# Patient Record
Sex: MALE | Race: WHITE
[De-identification: names, ages, dates, MRNs, and addresses within clinical notes are randomized per-mention and may not be internally consistent; named-entity substitution may affect disease eponyms.]

---

## 2017-01-06 ENCOUNTER — HOSPITAL ENCOUNTER (OUTPATIENT)
Age: 82
Setting detail: OBSERVATION
LOS: 3 days | Discharge: HOME | End: 2017-01-09
Payer: MEDICARE

## 2017-01-06 ENCOUNTER — HOSPITAL ENCOUNTER (OUTPATIENT)
Age: 82
Discharge: ANOTHER HOSPITAL | End: 2017-01-06

## 2017-01-06 DIAGNOSIS — E11.9: ICD-10-CM

## 2017-01-06 DIAGNOSIS — R26.0: Primary | ICD-10-CM

## 2017-01-06 DIAGNOSIS — Z66: ICD-10-CM

## 2017-01-06 DIAGNOSIS — Z86.79: ICD-10-CM

## 2017-01-06 DIAGNOSIS — Z79.82: ICD-10-CM

## 2017-01-06 DIAGNOSIS — Z60.2: ICD-10-CM

## 2017-01-06 DIAGNOSIS — J44.9: ICD-10-CM

## 2017-01-06 DIAGNOSIS — I15.2: ICD-10-CM

## 2017-01-06 DIAGNOSIS — Z87.891: ICD-10-CM

## 2017-01-06 DIAGNOSIS — F03.90: ICD-10-CM

## 2017-01-06 DIAGNOSIS — Z79.84: ICD-10-CM

## 2017-01-06 DIAGNOSIS — M25.552: ICD-10-CM

## 2017-01-06 DIAGNOSIS — E86.0: ICD-10-CM

## 2017-01-06 PROCEDURE — 83036 HEMOGLOBIN GLYCOSYLATED A1C: CPT

## 2017-01-06 PROCEDURE — 83880 ASSAY OF NATRIURETIC PEPTIDE: CPT

## 2017-01-06 PROCEDURE — 36415 COLL VENOUS BLD VENIPUNCTURE: CPT

## 2017-01-06 PROCEDURE — 96361 HYDRATE IV INFUSION ADD-ON: CPT

## 2017-01-06 PROCEDURE — 99285 EMERGENCY DEPT VISIT HI MDM: CPT

## 2017-01-06 PROCEDURE — 93005 ELECTROCARDIOGRAM TRACING: CPT

## 2017-01-06 PROCEDURE — 80053 COMPREHEN METABOLIC PANEL: CPT

## 2017-01-06 PROCEDURE — 85025 COMPLETE CBC W/AUTO DIFF WBC: CPT

## 2017-01-06 PROCEDURE — 82803 BLOOD GASES ANY COMBINATION: CPT

## 2017-01-06 PROCEDURE — 80048 BASIC METABOLIC PNL TOTAL CA: CPT

## 2017-01-06 PROCEDURE — 87275 INFLUENZA B AG IF: CPT

## 2017-01-06 PROCEDURE — 36600 WITHDRAWAL OF ARTERIAL BLOOD: CPT

## 2017-01-06 PROCEDURE — 97162 PT EVAL MOD COMPLEX 30 MIN: CPT

## 2017-01-06 PROCEDURE — 82553 CREATINE MB FRACTION: CPT

## 2017-01-06 PROCEDURE — 93010 ELECTROCARDIOGRAM REPORT: CPT

## 2017-01-06 PROCEDURE — 71010: CPT

## 2017-01-06 PROCEDURE — 70450 CT HEAD/BRAIN W/O DYE: CPT

## 2017-01-06 PROCEDURE — 96360 HYDRATION IV INFUSION INIT: CPT

## 2017-01-06 PROCEDURE — 81001 URINALYSIS AUTO W/SCOPE: CPT

## 2017-01-06 PROCEDURE — 82248 BILIRUBIN DIRECT: CPT

## 2017-01-06 PROCEDURE — 87276 INFLUENZA A AG IF: CPT

## 2017-01-06 PROCEDURE — 94640 AIRWAY INHALATION TREATMENT: CPT

## 2017-01-06 PROCEDURE — 83690 ASSAY OF LIPASE: CPT

## 2017-01-06 PROCEDURE — 73502 X-RAY EXAM HIP UNI 2-3 VIEWS: CPT

## 2017-01-06 PROCEDURE — 84484 ASSAY OF TROPONIN QUANT: CPT

## 2017-01-06 PROCEDURE — 97116 GAIT TRAINING THERAPY: CPT

## 2017-01-06 PROCEDURE — 82550 ASSAY OF CK (CPK): CPT

## 2017-01-06 PROCEDURE — 97530 THERAPEUTIC ACTIVITIES: CPT

## 2017-01-06 SDOH — SOCIAL STABILITY - SOCIAL INSECURITY: PROBLEMS RELATED TO LIVING ALONE: Z60.2

## 2017-01-10 ENCOUNTER — HOSPITAL ENCOUNTER (INPATIENT)
Age: 82
LOS: 8 days | Discharge: NURSING, REHAB OR LONG-TERM CARE | DRG: 64 | End: 2017-01-18
Payer: MEDICARE

## 2017-01-10 ENCOUNTER — HOSPITAL ENCOUNTER (OUTPATIENT)
Age: 82
Discharge: ANOTHER HOSPITAL | End: 2017-01-10
Payer: MEDICARE

## 2017-01-10 DIAGNOSIS — N32.89: ICD-10-CM
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
## 2017-01-10 DIAGNOSIS — Z91.81: ICD-10-CM

## 2017-01-10 DIAGNOSIS — I95.9: ICD-10-CM

## 2017-01-10 DIAGNOSIS — S20.409A: ICD-10-CM

## 2017-01-10 DIAGNOSIS — F03.90: ICD-10-CM

## 2017-01-10 DIAGNOSIS — Z78.1: ICD-10-CM

## 2017-01-10 DIAGNOSIS — R09.02: ICD-10-CM

## 2017-01-10 DIAGNOSIS — F03.91: ICD-10-CM

## 2017-01-10 DIAGNOSIS — Z87.891: ICD-10-CM

## 2017-01-10 DIAGNOSIS — S09.90XA: ICD-10-CM

## 2017-01-10 DIAGNOSIS — B96.89: ICD-10-CM

## 2017-01-10 DIAGNOSIS — J44.1: ICD-10-CM

## 2017-01-10 DIAGNOSIS — S50.319A: ICD-10-CM

## 2017-01-10 DIAGNOSIS — G81.94: ICD-10-CM

## 2017-01-10 DIAGNOSIS — I10: ICD-10-CM

## 2017-01-10 DIAGNOSIS — Z79.82: ICD-10-CM

## 2017-01-10 DIAGNOSIS — E11.9: ICD-10-CM

## 2017-01-10 DIAGNOSIS — N39.0: ICD-10-CM

## 2017-01-10 DIAGNOSIS — Z79.84: ICD-10-CM

## 2017-01-10 DIAGNOSIS — I63.29: Primary | ICD-10-CM

## 2017-01-10 DIAGNOSIS — R07.9: ICD-10-CM
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
## 2017-01-10 DIAGNOSIS — J69.0: ICD-10-CM

## 2017-01-10 DIAGNOSIS — R31.29: ICD-10-CM

## 2017-01-10 DIAGNOSIS — R33.9: ICD-10-CM

## 2017-01-10 DIAGNOSIS — Z66: ICD-10-CM

## 2017-01-10 DIAGNOSIS — R27.0: ICD-10-CM

## 2017-01-10 DIAGNOSIS — E11.65: ICD-10-CM

## 2017-01-10 DIAGNOSIS — R06.02: Primary | ICD-10-CM
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
## 2017-01-10 DIAGNOSIS — R29.810: ICD-10-CM

## 2017-01-22 ENCOUNTER — HOSPITAL ENCOUNTER (OUTPATIENT)
Age: 82
Discharge: HOME | End: 2017-01-22
Payer: MEDICARE

## 2017-01-22 DIAGNOSIS — J09.X2: Primary | ICD-10-CM

## 2017-01-29 ENCOUNTER — HOSPITAL ENCOUNTER (OUTPATIENT)
Age: 82
Discharge: HOME | End: 2017-01-29
Payer: MEDICARE

## 2017-01-29 DIAGNOSIS — I10: ICD-10-CM

## 2017-01-29 DIAGNOSIS — N39.0: ICD-10-CM

## 2017-01-29 DIAGNOSIS — R68.89: Primary | ICD-10-CM

## 2017-02-07 ENCOUNTER — HOSPITAL ENCOUNTER (OUTPATIENT)
Age: 82
Discharge: HOME | End: 2017-02-07
Payer: MEDICARE

## 2017-02-13 ENCOUNTER — HOSPITAL ENCOUNTER (EMERGENCY)
Dept: HOSPITAL 21 - SED | Age: 82
Discharge: HOME | End: 2017-02-13
Payer: MEDICARE

## 2017-02-13 VITALS
RESPIRATION RATE: 28 BRPM | OXYGEN SATURATION: 97 % | SYSTOLIC BLOOD PRESSURE: 108 MMHG | HEART RATE: 93 BPM | DIASTOLIC BLOOD PRESSURE: 54 MMHG

## 2017-02-13 VITALS — WEIGHT: 165.35 LBS | BODY MASS INDEX: 30.43 KG/M2 | HEIGHT: 62 IN

## 2017-02-13 VITALS — DIASTOLIC BLOOD PRESSURE: 62 MMHG | SYSTOLIC BLOOD PRESSURE: 123 MMHG | HEART RATE: 103 BPM | RESPIRATION RATE: 26 BRPM

## 2017-02-13 DIAGNOSIS — Z87.891: ICD-10-CM
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
## 2017-02-13 DIAGNOSIS — J44.9: ICD-10-CM
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
## 2017-02-13 DIAGNOSIS — R55: ICD-10-CM

## 2017-02-13 DIAGNOSIS — M79.675: ICD-10-CM

## 2017-02-13 DIAGNOSIS — E11.9: ICD-10-CM

## 2017-02-13 DIAGNOSIS — T83.9XXA: ICD-10-CM
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
## 2017-02-13 DIAGNOSIS — Z88.8: ICD-10-CM

## 2017-02-13 DIAGNOSIS — I10: ICD-10-CM

## 2017-02-13 DIAGNOSIS — R33.9: Primary | ICD-10-CM
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
## 2017-02-13 DIAGNOSIS — Z88.1: ICD-10-CM

## 2017-02-13 DIAGNOSIS — F03.90: ICD-10-CM

## 2017-02-13 DIAGNOSIS — Z79.82: ICD-10-CM

## 2017-02-13 PROCEDURE — 99284 EMERGENCY DEPT VISIT MOD MDM: CPT

## 2017-02-13 PROCEDURE — 51702 INSERT TEMP BLADDER CATH: CPT

## 2017-02-13 NOTE — ED.REPORT
HPI-General Illness


Date of Service


Feb 13, 2017


 ED Provider:  Ninoska Armenta MD


The patient is a 83 year old male with a hx of HTN, dementia, DM, COPD, and 

chronic back pain who presents to the ED via EMS due to a near LOC. Pt was at 

the urologist office for Grey removal and "slumped over." He reports that he 

did not hurt himself when he fell and complains that he stubbed his toe. He had 

a Grey placed while at [facility]. He was not able to see the urologist 

and is currently under palliative care. Pt was hospitalized in January for 

acute urinary retention.


Nursing Notes


Stated Complaint:  GROUND LEVEL FALL


Chief Complaint:  Multiple Trauma/Fall


Nursing Notes Reviewed:  Yes


Allergies:  


Coded Allergies:  


     Macrolide Antibiotics (Verified  Allergy, Unknown, 2/13/17)


 per [facility]


     azithromycin (Verified  Allergy, Unknown, 2/13/17)


 per [facility]


Uncoded Allergies:  


     ketolides (Allergy, Unknown, 4/14/15)


 per [facility]


Scheduled


Aspirin (Aspir 81) 81 Mg Tablet.dr 81 MG PO DAILY 


Lisinopril (Lisinopril) 10 Mg Tablet 10 MG PO DAILY 





General


Time Seen by MD:  11:48





Chief Complaint


Other (change in LOC)


Hx Obtained From:  Patient


Arrived By:  Ambulance


Sudden in Onset?:  Yes


Symptom Duration:  Since onset


Location:  : Foot left (toe pain)


Radiation:  : Does not radiate


Severity: Current:  Mild


Recent Healthcare:  Recent doctor visit


Similar Sx Previous:  No





Past Medical History


Past Medical History





Hypertension


Type 2 diabetes-not on medications per patient


Arthritis


chronic back pain


Reports: COPD, Hypertension





Past Surgical History





Denies





Smoking History


Former Smoker





Social History


Alcohol Use:  Denies alcohol use


Other Social History:  Lives in nursing home ([facility])





Ambulatory Status


Independent





Review of Systems


Full Review of Systems


Musculoskeletal:  Reports: Extremity pain (left toe)


Neurologic:  Reports: Change LOC


Complete sys rev & neg:  except as marked.





Physical Exam


Vital Signs





 Vital Signs








  Date Time  Temp Pulse Resp B/P Pulse Ox O2 Delivery O2 Flow Rate FiO2


 


2/13/17 11:36 36.6 103 26 123/62    








Initial VS:  Reviewed


    Head / Eyes:  Atraumatic, Normocephalic, PERRL


    ENT:  Mucous membranes moist, Conjunctiva normal, No scleral icterus


    Neck:  Supple, Non-tender, Full range of motion


    Respiratory:  Breath sounds normal, Clear to auscultation, No respiratory 

distress


    Cardiovascular:  Regular rate & rhythm, Heart sounds normal, Intact distal 

pulses


    Abdomen / GI:  Soft, Non-tender, No guarding, No rebound, No distention


    Back:  No CVA tenderness


    Extremities:  Vascular intact, Neuro intact, No swelling, No tenderness


    Skin:  Warm, Dry, No cyanosis


General/Constitutional:  Awake, No acute distress


Alertness:  Positive: Confused





Re-Eval/Medical Decision


Consultation :  


   Consulted With:  Urology


   Call Returned at:  12:45


   Consultant:  Agrees with eval, Agrees with plan


   Note:  


Case discussed with urologist. Confirms monthly grey changes. If pt would


like to schedule to avoid trial he can reschdule for the near future.


Counseled Regarding:  Diagnosis, Lab results, Need for follow-up, When/why to 

return to ED





Discharge & Departure





 Primary Impression:  


 Urinary retention


 Additional Impressions:  


 Grey catheter problem


 Encounter type:  initial encounter  Qualified Code:  T83.9XXA - Unspecified 

complication of genitourinary prosthetic device, implant and graft, initial 

encounter


 Fall from ground level


Disposition:  Home


Discharge Condition


All VS Reviewed:  Yes


Condition:  Stable





Additional Instructions:


You were noted to have urinary retention with your  hospital stay in January. 





You had a Grey catheter placed while in the hospital and were recommended to 

follow up with urology to see if it could be removed.  





With you urology appointment today you had an episode where you slid out of 

your wheelchair prior to being seen by the urologist 





you were sent to the emergency room for additional evaluation.  





There are no new acute findings.  





I spoke with Dr. Barbour, urologist, who recommended simply changing the 

Grey catheter.  This was done in the emergency room.  





The Grey catheter can remain in place with monthly changes.   He would like to 

see if it can come out another appointment with Dr. Hernandez and will need to be 

scheduled for a voiding trial 





 Please return to the Emergency Department for any new or worsening symptoms. 





We hope you feel better soon.


Referrals:  


[facility] (PCP)


Nanci Attestation


Portion of this note were transcribed by Trell Bullock. I, Dr. Armenta, 

personally performed the history, physical exam, and medical decision-making: I 

reviewed and confirmed the accuracy for the information in the transcribed note.


 


Signed by: nanci Ortiz, 2/13/17 1400


copies to:   Hakan Watters Shawna L MD Feb 13, 2017 11:48


TRELL BULLOCK Feb 13, 2017 12:53

## 2017-02-16 ENCOUNTER — HOSPITAL ENCOUNTER (OUTPATIENT)
Age: 82
Discharge: HOME | End: 2017-02-16
Payer: COMMERCIAL

## 2017-02-16 DIAGNOSIS — N39.0: Primary | ICD-10-CM

## 2017-02-17 ENCOUNTER — HOSPITAL ENCOUNTER (OUTPATIENT)
Age: 82
Discharge: HOME | End: 2017-02-17
Payer: MEDICARE

## 2017-03-06 ENCOUNTER — HOSPITAL ENCOUNTER (OUTPATIENT)
Age: 82
Discharge: HOME | End: 2017-03-06
Payer: MEDICARE

## 2017-03-06 DIAGNOSIS — M25.519: ICD-10-CM

## 2017-03-06 DIAGNOSIS — R33.9: ICD-10-CM

## 2017-03-06 DIAGNOSIS — L03.031: ICD-10-CM

## 2017-03-06 DIAGNOSIS — L60.0: ICD-10-CM

## 2017-03-06 DIAGNOSIS — E11.9: ICD-10-CM

## 2017-03-06 DIAGNOSIS — R05: ICD-10-CM

## 2017-03-06 DIAGNOSIS — M79.671: ICD-10-CM

## 2017-03-06 DIAGNOSIS — J44.9: Primary | ICD-10-CM

## 2017-03-06 DIAGNOSIS — M79.672: ICD-10-CM

## 2017-03-06 DIAGNOSIS — I63.9: ICD-10-CM

## 2017-03-06 DIAGNOSIS — M79.661: ICD-10-CM

## 2017-03-06 DIAGNOSIS — Z51.5: ICD-10-CM

## 2017-03-06 DIAGNOSIS — M79.662: ICD-10-CM

## 2017-03-06 DIAGNOSIS — J18.9: ICD-10-CM

## 2017-03-06 DIAGNOSIS — Z66: ICD-10-CM

## 2017-03-06 DIAGNOSIS — Z99.81: ICD-10-CM

## 2017-03-06 DIAGNOSIS — M54.9: ICD-10-CM

## 2017-03-16 ENCOUNTER — HOSPITAL ENCOUNTER (OUTPATIENT)
Age: 82
Discharge: HOME | End: 2017-03-16
Payer: MEDICARE

## 2017-03-16 DIAGNOSIS — N39.0: Primary | ICD-10-CM

## 2017-03-16 DIAGNOSIS — B37.9: ICD-10-CM

## 2017-03-16 DIAGNOSIS — Z66: ICD-10-CM

## 2017-03-16 DIAGNOSIS — Z99.3: ICD-10-CM

## 2017-03-16 DIAGNOSIS — J18.9: Primary | ICD-10-CM

## 2017-03-16 DIAGNOSIS — G62.9: ICD-10-CM

## 2017-03-16 DIAGNOSIS — J44.9: ICD-10-CM

## 2017-03-16 DIAGNOSIS — L03.031: ICD-10-CM

## 2017-03-16 DIAGNOSIS — M25.519: ICD-10-CM

## 2017-03-16 DIAGNOSIS — R63.0: ICD-10-CM

## 2017-03-16 DIAGNOSIS — I50.9: ICD-10-CM

## 2017-03-16 DIAGNOSIS — M54.9: ICD-10-CM

## 2017-03-16 DIAGNOSIS — E11.9: ICD-10-CM

## 2017-03-16 DIAGNOSIS — Z51.5: ICD-10-CM

## 2017-04-26 ENCOUNTER — HOSPITAL ENCOUNTER (OUTPATIENT)
Age: 82
Discharge: HOME | End: 2017-04-26
Payer: MEDICARE

## 2017-04-26 DIAGNOSIS — Z99.3: ICD-10-CM

## 2017-04-26 DIAGNOSIS — Z99.81: ICD-10-CM

## 2017-04-26 DIAGNOSIS — Z79.51: ICD-10-CM

## 2017-04-26 DIAGNOSIS — M25.511: ICD-10-CM

## 2017-04-26 DIAGNOSIS — R63.0: ICD-10-CM

## 2017-04-26 DIAGNOSIS — Z66: ICD-10-CM

## 2017-04-26 DIAGNOSIS — R33.9: ICD-10-CM

## 2017-04-26 DIAGNOSIS — Z51.5: Primary | ICD-10-CM

## 2017-04-26 DIAGNOSIS — J44.9: ICD-10-CM

## 2017-04-26 DIAGNOSIS — R53.83: ICD-10-CM

## 2017-05-10 ENCOUNTER — HOSPITAL ENCOUNTER (OUTPATIENT)
Age: 82
Discharge: HOME | End: 2017-05-10
Payer: MEDICARE

## 2017-05-10 DIAGNOSIS — R73.09: Primary | ICD-10-CM

## 2017-05-30 ENCOUNTER — HOSPITAL ENCOUNTER (OUTPATIENT)
Dept: HOSPITAL 76 - LAB.R | Age: 82
Discharge: HOME | End: 2017-05-30
Attending: FAMILY MEDICINE
Payer: MEDICARE

## 2017-05-30 DIAGNOSIS — R82.5: Primary | ICD-10-CM

## 2017-05-30 LAB
CUL URINE ADD CHARGE: (no result)
PH UR STRIP.AUTO: 7.5 PH (ref 5–7.5)
SP GR UR STRIP.AUTO: 1.01 (ref 1–1.03)
UR CULTURE IF IND: (no result)
UROBILINOGEN UR STRIP.AUTO-MCNC: NEGATIVE MG/DL
WBC # UR MANUAL: >25 /HPF (ref 0–3)

## 2017-05-30 PROCEDURE — 87086 URINE CULTURE/COLONY COUNT: CPT

## 2017-05-30 PROCEDURE — 81001 URINALYSIS AUTO W/SCOPE: CPT

## 2017-05-30 PROCEDURE — 87077 CULTURE AEROBIC IDENTIFY: CPT

## 2017-06-07 ENCOUNTER — HOSPITAL ENCOUNTER (OUTPATIENT)
Dept: HOSPITAL 76 - PC | Age: 82
Discharge: HOME | End: 2017-06-07
Attending: NURSE PRACTITIONER
Payer: MEDICARE

## 2017-06-07 DIAGNOSIS — M25.511: ICD-10-CM

## 2017-06-07 DIAGNOSIS — N39.0: ICD-10-CM

## 2017-06-07 DIAGNOSIS — Z79.51: ICD-10-CM

## 2017-06-07 DIAGNOSIS — E11.9: ICD-10-CM

## 2017-06-07 DIAGNOSIS — R45.1: ICD-10-CM

## 2017-06-07 DIAGNOSIS — Z99.3: ICD-10-CM

## 2017-06-07 DIAGNOSIS — M25.512: ICD-10-CM

## 2017-06-07 DIAGNOSIS — R41.3: ICD-10-CM

## 2017-06-07 DIAGNOSIS — Z99.81: ICD-10-CM

## 2017-06-07 DIAGNOSIS — Z66: ICD-10-CM

## 2017-06-07 DIAGNOSIS — J44.9: ICD-10-CM

## 2017-06-07 DIAGNOSIS — R21: ICD-10-CM

## 2017-06-07 DIAGNOSIS — K59.00: ICD-10-CM

## 2017-06-07 DIAGNOSIS — Z51.5: Primary | ICD-10-CM

## 2017-06-07 DIAGNOSIS — F32.9: ICD-10-CM

## 2017-06-07 PROCEDURE — 99309 SBSQ NF CARE MODERATE MDM 30: CPT

## 2017-06-08 NOTE — CONSULTATION NOTE
DATE OF CONSULTATION: 2017 00:00:00

 

REQUESTING PROVIDER: Dr. Jose Rodriguez.

 

TIME OF VISIT: 9:30 a.m. to 10.

 

TOPIC: Followup palliative care consult.

 

Thank you, Dr. Rodriguez, for asking the palliative care consult service to be involved in the care of yo
ur patient. I am seeing him at [facility], as this is his home now. I am providing support for pain and 
symptom management, as well as palliative care support.

 

EXAM LIMITATIONS: The patient with very short term memory issues. Has friends who are providing overs
ight and multiple clinical staff involved in care. 

 

BRIEF HISTORY OF PRESENT ILLNESS: This is a nati 83-year-old gentleman with severe end-stage COPD, 
recurrent pneumonias, most recently treated in March. He does have urinary retention with colonized u
rine and was increasingly symptomatic on 2017. The patient was complaining of need to urinate, 
more confused and febrile. Bladder scan was negative. Had had severely resistant urine in the past, s
o UA with CandS was obtained after catheter changed. 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
sistance to most medications. Did consult with Dr. Lee, covering for Dr. Rodriguez, and Omnicef was ord
ered, a third generation cephalosporin, to satisfy coverage for now. He is just finishing this up. A 
concern of staff reported was that he had the same symptoms of needing to urinate and frequently into
 the bathroom to try. Currently, during our exam, he reports his catheter is "finally normal." He otero
s appear to have constipation and indeed had passed just a few hard stools. The patient himself seems
 to be in quite good spirits. He does have still kind of a rough, red bright rash on his face and nec
k. He had been on hydrocortisone 1% cream. It does get exacerbated with shaving, which he does like t
o be shaved, but does seem to be worsening.

 

SYMPTOM BURDEN: The patient does complain of bilateral shoulder pain, right greater, does demonstrate
 pain behaviors today with any kind of movement or demonstration and palpation. He denies fatigue. Re
ports his appetite is good. No nausea. Does deny depression and perceives his quality of life as good
 enough. Reports his appetite has been doing well and the chart does show he has been working with sp
eech therapy. I have left a message, but no feedback yet.

 

CODE STATUS: THE PATIENT IS DO NOT ATTEMPT RESUSCITATION, DO NOT INTUBATE, LIMITED INTERVENTIONS, DET
ERMINE THE USE OR LIMITATION OF ANTIBIOTICS WITH COMFORT AS THE GOAL AND NO MEDICALLY ASSISTED NUTRIT
ION. HIS DPOAs are [employer] and their phone number is [phone].

 

REVIEW OF SYSTEMS

HEENT: The patient is hard of hearing. He denies trouble with swallowing.

CARDIOVASCULAR: Denies chest pain.

RESPIRATORY: Denies shortness of breath. He is back on oxygen. He does have an order to discontinue i
f his sats are greater than 90%.

GASTROINTESTINAL: He is reporting constipation. He was given a glass of prune juice during our visit.


GENITOURINARY: Continues to have a Gupta catheter.

INTEGUMENTARY: His right toe is resolved. Candidiasis is improved as well.

NEUROLOGIC: He does seem to recognize me, is able to recall my name. He is able to tell me as far as 
Jemima and Hollis visiting and the fact that their dog had . He can speak in full sentences. He ha
s a nati sense of humor and does not appear confused from his baseline today.

PSYCHIATRIC: Does not appear depressed or anxious, though I have ordered some lorazepam for p.r.n. ag
itation given the report that he was having trouble with his catheter. This does seem more anxiety ba
sed from the nurse I talked to. They have not used it yet.

ENDOCRINE: He is on his metformin. His blood sugar has been running on the lower side.

HEMATOLOGIC/IMMUNOLOGIC: Most recently was treated in March with Levaquin for community-acquired pneu
monia, cellulitis in his right toe, and now has been treated with that for his UTI.

 

PHYSICAL EXAMINATION

GENERAL APPEARANCE: He does appear somewhat fatigued. He is making good eye contact. He still has alejandra
te a bit of facial rash and is unshaven.

EYES: With slight periorbital edema.

HEENT: His mucous membranes are slightly dry with some concern for candidiasis. I did have him take o
ut his dentures, which were quite dirty. No signs of ulceration or candidiasis after he rinsed his mo
uth.

NECK: Trachea midline. No lymphadenopathy.

RESPIRATORY: They are diminished, but clear. No wheezing, which is usually his baseline. 

CARDIOVASCULAR: His temperature is 98.8, O2 saturation 95% on 2 liters. I did take him off for about 
10 minutes, and his sats dropped into the low 80s. His oxygen was replaced. His pulse is 95, blood pr
essure 118/52.

ABDOMEN: Rounded, soft. It is nontender to palpation. No bladder distention noted.

SKIN: His groin folds are improved. His coccyx is clear. His right toe is healed. He is due for an ap
pointment with Dr. Jones.

EXTREMITIES: He is able to move all extremities, but has limited range of motion in his upper arms, p
articularly right shoulder. He remains mostly wheelchair bound.

 

PALLIATIVE CARE DISCUSSION: Who is present, myself and the patient and Nichole Henry, the nurse practit
tri. He is getting regular visits from the palliative care , which he does enjoy. I did jazmyne
ck with his DPOA, Jemima. They are trying to visit at least 1-2 times a week and getting other members 
from the museum to drop in and check on him as well, trying to decrease his isolation. When asked wha
t he worries about most, he says it does not do any good to worry. He does seem a little bit more lig
hter than the last time I saw him. We did discuss about a volunteer to break up the isolation. I did 
speak with Jemima, though he had only short service time, his being a  has been very important w
ith him and will refer him to our [facility] program.

 

IMPRESSION: This is a nait 83-year-old gentleman, who has end-stage COPD, now presents with urinary
 tract infection. He does have multiple drug resistances and I am concerned that we are hopefully not
 running out of options for improving his quality of life, as well as extending quantity. He will rem
ain colonized, but most recently exacerbated with symptoms. Today, he does feel like he is improving,
 though presents with constipation.

 

RECOMMENDATIONS/COUNSELING DONE

1. Endstage chronic obstructive pulmonary disease. His oxygen sats still drop off of the O2 in the 80
's. In following up with the nursing staff, this has been sporadic, has been able to have it off for 
short periods of time.

2. Cellulitis of his right toe. This has resolved. I did phone both Jemima, they do have an appointment
 with Dr. Jones coming up.

3. Right shoulder pain. The patient is unable due to his underlying cognitive deficits to ask for Tyl
enol consistently, does perceive it helps. I will go ahead and schedule 325 two tabs t.i.d. to see if
 there is improvement with that. If this is not improved, we will consider a stronger opioid or consi
dering possibly an NSAID.

4. Facial rash. We will increase his hydrocortisone cream to 2.5% and have it scheduled for b.i.d.

5. Depression. It does appear improved. We will go ahead and make a referral to the [facility] program, as well as continue with  visits, which he does enjoy on a regular basis.

 

MEDICATION LIST

1. Ondansetron 4 mg every 6 hours as needed for nausea.

2. Lorazepam 0.5 mg 1/2 tab every 8 hours as needed for anxiety.

3. House bowel program.

4. Acetaminophen 325 mg 2 tabs t.i.d.

5. Glucagon emergency kit available.

6. Metformin 500 mg b.i.d.

7. Tums 500 mg b.i.d.

8. DuoNeb solution 0.5-2.5 mg in 3 mL, 1 vial 3 times a day.

9. Gabapentin 100 mg t.i.d. for lower extremity pain.

10. Advair Diskus 1 puff b.i.d.

11. Cefdinir capsule 300 mg 1 tab b.i.d. x10 days and date of 06/10.

12. Florastor 250 mg 1 tab b.i.d.

13. Plavix 75 mg daily.

14. Remeron 15 mg daily.

15. Tamsulosin 0.4 mg daily.

16. Latanoprost solution 1 drop in both eyes at bedtime.

 

TIME SPENT: 30 minutes with greater than 50% of this done in counseling and coordination. Follow up w
ith his DPOA and facility staff.

 

 

 

DD:2017 22:18:00  DT: 2017 03:03  JOB #: [id number]  EXT JOB #:[id number]

## 2017-06-22 ENCOUNTER — HOSPITAL ENCOUNTER (OUTPATIENT)
Dept: HOSPITAL 76 - PC | Age: 82
Discharge: HOME | End: 2017-06-22
Attending: NURSE PRACTITIONER
Payer: MEDICARE

## 2017-06-22 DIAGNOSIS — F32.9: ICD-10-CM

## 2017-06-22 DIAGNOSIS — R21: ICD-10-CM

## 2017-06-22 DIAGNOSIS — Z96.0: ICD-10-CM

## 2017-06-22 DIAGNOSIS — E11.9: ICD-10-CM

## 2017-06-22 DIAGNOSIS — Z66: ICD-10-CM

## 2017-06-22 DIAGNOSIS — M25.512: ICD-10-CM

## 2017-06-22 DIAGNOSIS — J44.9: ICD-10-CM

## 2017-06-22 DIAGNOSIS — M25.511: ICD-10-CM

## 2017-06-22 DIAGNOSIS — Z51.5: Primary | ICD-10-CM

## 2017-06-22 DIAGNOSIS — R33.9: ICD-10-CM

## 2017-06-22 DIAGNOSIS — H91.93: ICD-10-CM

## 2017-06-22 DIAGNOSIS — N39.0: ICD-10-CM

## 2017-06-22 DIAGNOSIS — J18.9: ICD-10-CM

## 2017-06-22 PROCEDURE — 99310 SBSQ NF CARE HIGH MDM 45: CPT

## 2017-06-23 ENCOUNTER — HOSPITAL ENCOUNTER (OUTPATIENT)
Dept: HOSPITAL 76 - LAB.R | Age: 82
Discharge: HOME | End: 2017-06-23
Attending: SKILLED NURSING FACILITY
Payer: MEDICARE

## 2017-06-23 DIAGNOSIS — Z79.899: Primary | ICD-10-CM

## 2017-06-23 LAB
ALBUMIN/GLOB SERPL: 0.9 {RATIO} (ref 1–2.2)
ANION GAP SERPL CALCULATED.4IONS-SCNC: 7 MMOL/L (ref 6–13)
BASOPHILS NFR BLD AUTO: 0 10^3/UL (ref 0–0.1)
BASOPHILS NFR BLD AUTO: 0.5 %
BILIRUB BLD-MCNC: 0.3 MG/DL (ref 0.2–1)
BUN SERPL-MCNC: 10 MG/DL (ref 6–20)
CALCIUM UR-MCNC: 8.9 MG/DL (ref 8.5–10.3)
CHLORIDE SERPL-SCNC: 100 MMOL/L (ref 101–111)
CO2 SERPL-SCNC: 31 MMOL/L (ref 21–32)
CREAT SERPLBLD-SCNC: 0.7 MG/DL (ref 0.6–1.2)
EOSINOPHIL # BLD AUTO: 0.6 10^3/UL (ref 0–0.7)
EOSINOPHIL NFR BLD AUTO: 6.8 %
ERYTHROCYTE [DISTWIDTH] IN BLOOD BY AUTOMATED COUNT: 12.6 % (ref 12–15)
GFRSERPLBLD MDRD-ARVRAT: 108 ML/MIN/{1.73_M2} (ref 89–?)
GLOBULIN SER-MCNC: 3.5 G/DL (ref 2.1–4.2)
GLUCOSE SERPL-MCNC: 110 MG/DL (ref 70–100)
HCT VFR BLD AUTO: 34.3 % (ref 42–52)
HGB UR QL STRIP: 11.4 G/DL (ref 14–18)
LYMPHOCYTES # SPEC AUTO: 1.6 10^3/UL (ref 1.5–3.5)
LYMPHOCYTES NFR BLD AUTO: 19.7 %
MCH RBC QN AUTO: 30.8 PG (ref 27–31)
MCHC RBC AUTO-ENTMCNC: 33.2 G/DL (ref 32–36)
MCV RBC AUTO: 92.6 FL (ref 80–94)
MONOCYTES # BLD AUTO: 0.6 10^3/UL (ref 0–1)
MONOCYTES NFR BLD AUTO: 6.9 %
NEUTROPHILS # BLD AUTO: 5.5 10^3/UL (ref 1.5–6.6)
NEUTROPHILS # SNV AUTO: 8.4 X10^3/UL (ref 4.8–10.8)
NEUTROPHILS NFR BLD AUTO: 66.1 %
NRBC # BLD AUTO: 0.1 /100WBC
PDW BLD AUTO: 7.4 FL (ref 7.4–11.4)
POTASSIUM SERPL-SCNC: 4.3 MMOL/L (ref 3.5–5)
PROT SPEC-MCNC: 6.5 G/DL (ref 6.7–8.2)
RBC MAR: 3.71 10^6/UL (ref 4.7–6.1)
SODIUM SERPLBLD-SCNC: 138 MMOL/L (ref 135–145)
WBC # BLD: 8.4 X10^3/UL

## 2017-06-23 PROCEDURE — 85025 COMPLETE CBC W/AUTO DIFF WBC: CPT

## 2017-06-23 PROCEDURE — 80053 COMPREHEN METABOLIC PANEL: CPT

## 2017-06-23 NOTE — CONSULTATION NOTE
DATE OF CONSULTATION: 06/22/2017 00:00:00

 

REQUESTING PROVIDER: Dr. Rodriguez.

 

TIME OF VISIT: 11:30 to 12:15.

 

TOPIC: Followup palliative care consult.

 

Thank you, Dr. Rodriguez for asking the palliative care consult service to be 
involved in the care of your patient. I am seeing the patient at [facility] as he 
is transitioning to his Medicaid stay and providing support for pain and 
symptom management.

 

EXAMINATION LIMITATIONS: The patient with very poor short-term memory. His 
friends are providing oversight but has multiple clinical staff who are 
involved in his care.

 

BRIEF HISTORY OF PRESENT ILLNESS UPDATE: This is a nati 83-year-old gentleman 
with severe end-stage COPD, recurrent pneumonias and recurrent urinary 
infections. He does have urinary retention, but had been increasing symptomatic 
and was treated with Omnicef. had improved his symptoms somewhat, but over the 
last week he has had signs and symptoms of a viral infection. There has been a 
cold going around the facility. He was reported to have a low-grade temperature 
of 99.7 on the 11th with a runny nose, clear drainage and productive cough. His 
O2 saturations remained stable at 94% and was eating and drinking. Continues to 
kind of wax and wane and most recently in the last few days he denies shortness 
of breath. He does have some expiratory rhonchi in the right lower lobe.  He is 
diminished throughout but is afebrile. He is taking his nebulizer 3 times a day
, his temperature today is 97.8, O2 saturations on 2 liters 93%, as well as off 
about 3 minutes 93%. His pulse is 87, blood pressure 132/64. He reports he just 
does not "feel well." He is unable to really pinpoint what is uncomfortable. He 
denies shortness of breath. He denies pain, but does report pain on exam with 
his bilateral shoulder pain. He just seems quite depressed and withdrawn. Of 
note, his mirtazapine was decreased to 7.5 because of his weight gain and the 
need to revisit psychotropic drugs in a nursing facility. The patient does 
perceive himself as depressed.

 

SYMPTOM BURDEN: The patient does complain of bilateral shoulder pain, is tender 
with palpation. Does demonstrate pain behaviors with movement. The patient 
remains quite reticent to take "medications" as far as asking if he would like 
something better to manage this. The staff reports his appetite has been poor 
and does seem much more withdrawn and less energetic today. 

 

CODE STATUS: THE PATIENT IS A DO NOT ATTEMPT RESUSCITATION, DO NOT INTUBATE, 
LIMITED INTERVENTIONS, DETERMINE USE OR LIMITATION OF ANTIBIOTICS WITH COMFORT 
AS THE GOAL AND NO MEDICALLY ASSISTED NUTRITION. HIS DPOA IS [employer]ARYAM ROBERTS THEIR PHONE NUMBER [phone].

 

REVIEW OF SYSTEMS:

HEENT: The patient is hard of hearing. 

CARDIOVASCULAR: Denies chest pain. 

RESPIRATORY: Denies shortness of breath. He is on baseline oxygen.

GASTROINTESTINAL: He did have diarrhea and some vomiting last week. 

GENITOURINARY: He continues to have a Gupta catheter.

NEUROLOGIC: He does seem to recognize me, cannot recall my name. He does speak 
in full sentences, but he is quite quiet today.

PSYCHIATRIC:  He does appear much more depressed, withdrawn. Does report he is 
feeling poorly, staff have reported this as well.

ENDOCRINE: His blood sugars continue to run on the low side. 

HEMATOLOGIC/IMMUNOLOGIC: Recently treated for his UTI.

 

PHYSICAL EXAMINATION:

GENERAL APPEARANCE: He does appear somewhat fatigued. He is less energetic, has 
still some facial rash and is unshaven.

EYES: With slight periorbital edema.

ENT: Mucous membranes are slightly dry.

NECK: Trachea midline. No lymphadenopathy.

RESPIRATORY: As noted above.

CARDIOVASCULAR: Vital signs as noted above, his regular rate and rhythm.

ABDOMEN: Rounded, soft, nontender to palpation. No bladder distention noted.

SKIN: His coccyx is clear. His right toe is healed, his heels are clear.

EXTREMITIES: He is a difficult transfer as far as weightbearing, has limited 
range of motion in his upper arms. This does exacerbate his pain, and he 
remains mostly wheelchair bound.

 

PALLIATIVE CARE DISCUSSION/WHO IS PRESENT: Myself, the patient, and Nichole Rolon
, nurse practitioner. He is getting regular visits from the palliative care 
. He does report feeling depressed, staff are concerned as he is less 
"jovial."  Is spending more time sleeping and time in the chair. He is eating 
less, is unclear to really be able to put pinpoint anything in particular other 
than this viral illness and his decrease in his mirtazapine. Did make a 
volunteer referral, it does not look that has been initiated yet. He is getting 
visits from the palliative care . 

 

This is a nati 80-year-old gentleman who has end-stage COPD recently treated 
for a urinary tract infection. He does have multiple drug resistances. He has 
had recently within the last couple weeks a viral illness, this has been 
improving though he has had increased weight loss. He was 149.6 today, 
diminished energy, increased symptoms of depression and sleeping more.

 

RECOMMENDATIONS/COUNSELING DONE:

1. End-stage chronic obstructive pulmonary disease. He does have risk for 
recurrent pneumonia. He has been improving with his viral illness. He is 
afebrile. He does have some advantageous breath sounds as far as rhonchi with 
expiration in the right lobe but diminished in bases, but it is not tight or 
wheezy, does not perceive himself as short of breath. We will get a CBC to see 
if his white count is elevated.

2. He is receiving scheduled Tylenol. Unclear if this has been more effective 
for him. We can possibly consider an NSAID, a CMP has been taken and will look 
at his kidney function.

3. Facial rash, it is slightly improved, though still present.

4. Depression. We will go head and increase mirtazapine back up to 50 mg daily. 
We will followup on volunteer, continue with palliative care  visits.

5. Diabetes type 2. His A1c was well within the range. He continues to have 
intermittent low blood sugars and he is eating less. Will go ahead and stop his 
metformin and keep an eye on his blood sugars for 2 weeks and see if this is 
impactfull.

 

TIME SPENT: Forty-five minutes with greater than 50% of this done in counseling 
and coordination of care. I will followup on his labs and DPOA, did followup 
with facility staff for plan of care.



ADDENDUM: 6/26 Follow up with [facility], notices rash resolving, reports he was 
doing better on Sunday. Worried about continued shoulder pain, has difficulty 
dressing. Patient has been hesitant to add "more" pills. Will try Voltaren Gel 
three times a day, kidney function was good, will continue the APAP as well. 
Update provided, no further concerns. 

 

 

 

DD:06/22/2017 17:43:00  DT: 06/22/2017 19:12  JOB #: [id number]  EXT JOB #:[id number]

ARIES

## 2017-07-22 ENCOUNTER — HOSPITAL ENCOUNTER (OUTPATIENT)
Dept: HOSPITAL 76 - EMS | Age: 82
Discharge: ANOTHER HOSPITAL | End: 2017-07-22
Attending: SURGERY
Payer: MEDICARE

## 2017-07-22 ENCOUNTER — HOSPITAL ENCOUNTER (INPATIENT)
Dept: HOSPITAL 76 - ED | Age: 82
LOS: 5 days | Discharge: NURSING, REHAB OR LONG-TERM CARE | DRG: 871 | End: 2017-07-27
Attending: NURSE PRACTITIONER | Admitting: NURSE PRACTITIONER
Payer: MEDICARE

## 2017-07-22 DIAGNOSIS — J44.0: ICD-10-CM

## 2017-07-22 DIAGNOSIS — K57.32: ICD-10-CM

## 2017-07-22 DIAGNOSIS — Z78.1: ICD-10-CM

## 2017-07-22 DIAGNOSIS — E11.9: ICD-10-CM

## 2017-07-22 DIAGNOSIS — N30.01: ICD-10-CM

## 2017-07-22 DIAGNOSIS — E11.22: ICD-10-CM

## 2017-07-22 DIAGNOSIS — Z96.0: ICD-10-CM

## 2017-07-22 DIAGNOSIS — Z66: ICD-10-CM

## 2017-07-22 DIAGNOSIS — N30.00: ICD-10-CM

## 2017-07-22 DIAGNOSIS — Z79.02: ICD-10-CM

## 2017-07-22 DIAGNOSIS — Z16.24: ICD-10-CM

## 2017-07-22 DIAGNOSIS — A41.59: ICD-10-CM

## 2017-07-22 DIAGNOSIS — E11.65: ICD-10-CM

## 2017-07-22 DIAGNOSIS — N40.1: ICD-10-CM

## 2017-07-22 DIAGNOSIS — M25.511: ICD-10-CM

## 2017-07-22 DIAGNOSIS — A41.9: ICD-10-CM

## 2017-07-22 DIAGNOSIS — Z87.891: ICD-10-CM

## 2017-07-22 DIAGNOSIS — D51.9: ICD-10-CM

## 2017-07-22 DIAGNOSIS — M25.512: ICD-10-CM

## 2017-07-22 DIAGNOSIS — I50.9: ICD-10-CM

## 2017-07-22 DIAGNOSIS — J01.11: ICD-10-CM

## 2017-07-22 DIAGNOSIS — G89.29: ICD-10-CM

## 2017-07-22 DIAGNOSIS — J18.9: ICD-10-CM

## 2017-07-22 DIAGNOSIS — T83.091A: Primary | ICD-10-CM

## 2017-07-22 DIAGNOSIS — I11.0: ICD-10-CM

## 2017-07-22 DIAGNOSIS — N18.9: ICD-10-CM

## 2017-07-22 DIAGNOSIS — E87.6: ICD-10-CM

## 2017-07-22 DIAGNOSIS — Z74.01: ICD-10-CM

## 2017-07-22 DIAGNOSIS — I95.9: ICD-10-CM

## 2017-07-22 DIAGNOSIS — J81.1: ICD-10-CM

## 2017-07-22 DIAGNOSIS — R39.9: Primary | ICD-10-CM

## 2017-07-22 DIAGNOSIS — R33.8: ICD-10-CM

## 2017-07-22 DIAGNOSIS — I10: ICD-10-CM

## 2017-07-22 DIAGNOSIS — R10.30: ICD-10-CM

## 2017-07-22 LAB
ALBUMIN/GLOB SERPL: 0.9 {RATIO} (ref 1–2.2)
ANION GAP SERPL CALCULATED.4IONS-SCNC: 10 MMOL/L (ref 6–13)
BASOPHILS NFR BLD AUTO: 0.1 10^3/UL (ref 0–0.1)
BASOPHILS NFR BLD AUTO: 0.6 %
BILIRUB BLD-MCNC: 0.8 MG/DL (ref 0.2–1)
BUN SERPL-MCNC: 10 MG/DL (ref 6–20)
CALCIUM UR-MCNC: 9.5 MG/DL (ref 8.5–10.3)
CHLORIDE SERPL-SCNC: 97 MMOL/L (ref 101–111)
CO2 SERPL-SCNC: 29 MMOL/L (ref 21–32)
CREAT SERPLBLD-SCNC: 0.7 MG/DL (ref 0.6–1.2)
CUL URINE ADD CHARGE: (no result)
EOSINOPHIL # BLD AUTO: 0.2 10^3/UL (ref 0–0.7)
EOSINOPHIL NFR BLD AUTO: 1.1 %
ERYTHROCYTE [DISTWIDTH] IN BLOOD BY AUTOMATED COUNT: 13.9 % (ref 12–15)
EST. AVERAGE GLUCOSE BLD GHB EST-MCNC: 140 MG/DL (ref 70–100)
GFRSERPLBLD MDRD-ARVRAT: 108 ML/MIN/{1.73_M2} (ref 89–?)
GLOBULIN SER-MCNC: 3.8 G/DL (ref 2.1–4.2)
GLUCOSE SERPL-MCNC: 215 MG/DL (ref 70–100)
HBA1C BLD-MCNC: 0.64 G/DL
HCT VFR BLD AUTO: 36.7 % (ref 42–52)
HGB UR QL STRIP: 12.4 G/DL (ref 14–18)
LIPASE SERPL-CCNC: 19 U/L (ref 22–51)
LYMPHOCYTES # SPEC AUTO: 0.8 10^3/UL (ref 1.5–3.5)
LYMPHOCYTES NFR BLD AUTO: 5 %
MCH RBC QN AUTO: 30.8 PG (ref 27–31)
MCHC RBC AUTO-ENTMCNC: 33.9 G/DL (ref 32–36)
MCV RBC AUTO: 91.1 FL (ref 80–94)
MONOCYTES # BLD AUTO: 0.7 10^3/UL (ref 0–1)
MONOCYTES NFR BLD AUTO: 4.8 %
NEUTROPHILS # BLD AUTO: 13.6 10^3/UL (ref 1.5–6.6)
NEUTROPHILS # SNV AUTO: 15.3 X10^3/UL (ref 4.8–10.8)
NEUTROPHILS NFR BLD AUTO: 88.5 %
NRBC # BLD AUTO: 0.1 /100WBC
PDW BLD AUTO: 7.8 FL (ref 7.4–11.4)
PH UR STRIP.AUTO: 6.5 PH (ref 5–7.5)
POTASSIUM SERPL-SCNC: 4.3 MMOL/L (ref 3.5–5)
PROT SPEC-MCNC: 7.4 G/DL (ref 6.7–8.2)
RBC MAR: 4.03 10^6/UL (ref 4.7–6.1)
SODIUM SERPLBLD-SCNC: 136 MMOL/L (ref 135–145)
SP GR UR STRIP.AUTO: 1.02 (ref 1–1.03)
UA CHARGE (STRIP ONLY): YES
UA W/ MICROSCOPIC CHARGE: (no result)
UR CULTURE IF IND: (no result)
UROBILINOGEN UR STRIP.AUTO-MCNC: NEGATIVE MG/DL
WBC # BLD: 15.3 X10^3/UL

## 2017-07-22 PROCEDURE — 81001 URINALYSIS AUTO W/SCOPE: CPT

## 2017-07-22 PROCEDURE — 83690 ASSAY OF LIPASE: CPT

## 2017-07-22 PROCEDURE — 70450 CT HEAD/BRAIN W/O DYE: CPT

## 2017-07-22 PROCEDURE — 51700 IRRIGATION OF BLADDER: CPT

## 2017-07-22 PROCEDURE — 87040 BLOOD CULTURE FOR BACTERIA: CPT

## 2017-07-22 PROCEDURE — 82607 VITAMIN B-12: CPT

## 2017-07-22 PROCEDURE — 94640 AIRWAY INHALATION TREATMENT: CPT

## 2017-07-22 PROCEDURE — 83036 HEMOGLOBIN GLYCOSYLATED A1C: CPT

## 2017-07-22 PROCEDURE — 99285 EMERGENCY DEPT VISIT HI MDM: CPT

## 2017-07-22 PROCEDURE — 87077 CULTURE AEROBIC IDENTIFY: CPT

## 2017-07-22 PROCEDURE — 36415 COLL VENOUS BLD VENIPUNCTURE: CPT

## 2017-07-22 PROCEDURE — 81003 URINALYSIS AUTO W/O SCOPE: CPT

## 2017-07-22 PROCEDURE — 96365 THER/PROPH/DIAG IV INF INIT: CPT

## 2017-07-22 PROCEDURE — 36600 WITHDRAWAL OF ARTERIAL BLOOD: CPT

## 2017-07-22 PROCEDURE — 82140 ASSAY OF AMMONIA: CPT

## 2017-07-22 PROCEDURE — 87640 STAPH A DNA AMP PROBE: CPT

## 2017-07-22 PROCEDURE — 87086 URINE CULTURE/COLONY COUNT: CPT

## 2017-07-22 PROCEDURE — 51702 INSERT TEMP BLADDER CATH: CPT

## 2017-07-22 PROCEDURE — 71010: CPT

## 2017-07-22 PROCEDURE — 83735 ASSAY OF MAGNESIUM: CPT

## 2017-07-22 PROCEDURE — 82803 BLOOD GASES ANY COMBINATION: CPT

## 2017-07-22 PROCEDURE — 85025 COMPLETE CBC W/AUTO DIFF WBC: CPT

## 2017-07-22 PROCEDURE — 99284 EMERGENCY DEPT VISIT MOD MDM: CPT

## 2017-07-22 PROCEDURE — 96375 TX/PRO/DX INJ NEW DRUG ADDON: CPT

## 2017-07-22 PROCEDURE — 83605 ASSAY OF LACTIC ACID: CPT

## 2017-07-22 PROCEDURE — 71020: CPT

## 2017-07-22 PROCEDURE — 96361 HYDRATE IV INFUSION ADD-ON: CPT

## 2017-07-22 PROCEDURE — 84443 ASSAY THYROID STIM HORMONE: CPT

## 2017-07-22 PROCEDURE — 80053 COMPREHEN METABOLIC PANEL: CPT

## 2017-07-22 PROCEDURE — 74177 CT ABD & PELVIS W/CONTRAST: CPT

## 2017-07-22 PROCEDURE — 83880 ASSAY OF NATRIURETIC PEPTIDE: CPT

## 2017-07-22 PROCEDURE — 96367 TX/PROPH/DG ADDL SEQ IV INF: CPT

## 2017-07-22 RX ADMIN — MIRTAZAPINE SCH MG: 15 TABLET, FILM COATED ORAL at 21:14

## 2017-07-22 RX ADMIN — SODIUM CHLORIDE SCH MLS/HR: 9 INJECTION, SOLUTION INTRAVENOUS at 18:25

## 2017-07-22 RX ADMIN — GABAPENTIN SCH MG: 100 CAPSULE ORAL at 21:14

## 2017-07-22 RX ADMIN — SODIUM CHLORIDE, PRESERVATIVE FREE SCH ML: 5 INJECTION INTRAVENOUS at 21:14

## 2017-07-22 RX ADMIN — TAMSULOSIN HYDROCHLORIDE SCH MG: 0.4 CAPSULE ORAL at 21:14

## 2017-07-22 RX ADMIN — METRONIDAZOLE SCH MLS/HR: 500 INJECTION, SOLUTION INTRAVENOUS at 23:48

## 2017-07-22 RX ADMIN — LATANOPROST SCH DROPS: 50 SOLUTION OPHTHALMIC at 21:13

## 2017-07-22 RX ADMIN — INSULIN ASPART SCH UNIT: 100 INJECTION, SOLUTION INTRAVENOUS; SUBCUTANEOUS at 21:16

## 2017-07-22 RX ADMIN — SODIUM CHLORIDE SCH MLS/HR: 900 INJECTION INTRAVENOUS at 19:16

## 2017-07-22 NOTE — ED PHYSICIAN DOCUMENTATION
PD HPI ABD PAIN





- Stated complaint


Stated Complaint: MALE 





- Chief complaint


Chief Complaint: Abd Pain





- History obtained from


History obtained from: Patient, EMS





- History of Present Illness


Timing - onset: Today


Timing - duration: Days (1)


Timing - details: Abrupt onset


Pain level max: 10


Pain level now: 10


Quality: Pain


Location: Suprapubic, LLQ


Improved by: Other (nothing)


Worsened by: Other (nothing)


Associated symptoms: No: Fever, Nausea, Vomiting, Hematemesis, Diarrhea, 

Constipation, Melena, Hematochezia, Dysuria


Similar symptoms before: Has not had sx before


Recently seen: Not recently seen





Review of Systems


Ten Systems: 10 systems reviewed and negative


Constitutional: denies: Fever, Chills


Ears: denies: Ear pain


Throat: denies: Sore throat


Cardiac: denies: Chest pain / pressure


Respiratory: denies: Cough


GI: denies: Nausea, Vomiting, Diarrhea


Skin: denies: Rash


Musculoskeletal: denies: Neck pain, Back pain


Neurologic: denies: Focal weakness, Numbness, Headache





PD PAST MEDICAL HISTORY





- Past Medical History


Cardiovascular: Congestive heart failure, Hypertension


Respiratory: COPD


Neuro: None, Other


Endocrine/Autoimmune: Type 2 diabetes


GI: None


: None


HEENT: Chronic vision loss, Other


Psych: None


Musculoskeletal: Chronic back pain


Derm: None





- Past Surgical History


Past Surgical History: Yes


General: Colonoscopy


HEENT: Cataracts





- Present Medications


Home Medications: 


 Ambulatory Orders











 Medication  Instructions  Recorded  Confirmed


 


Latanoprost 1 drops EACHEYE QPM 09/12/16 07/22/17


 


Acetaminophen 325 mg PO Q4H PRN 11/26/16 07/22/17


 


Fluticasone/Salmeterol [Advair 1 inh INH BID 11/26/16 07/22/17





100-50 Diskus]   


 


Calcium Carbonate [Tums (Calcium 500 mg PO Q8HR PRN #0 tablet 01/18/17 07/22/17





Carbonate 500mg)]   


 


Clopidogrel [Plavix] 75 mg PO DAILY 30 Days 01/18/17 07/22/17


 


Gabapentin [Neurontin] 100 mg PO TID 07/22/17 07/22/17


 


Ipratropium/Albuterol [Duoneb] 3 ml INH Q6H PRN 07/22/17 07/22/17


 


Lorazepam 0.25 mg PO TID PRN 07/22/17 07/22/17


 


Mirtazapine [Remeron] 15 mg PO QPM 07/22/17 07/22/17


 


Tamsulosin HCl [Flomax] 0.4 mg PO QPM 07/22/17 07/22/17














- Allergies


Allergies/Adverse Reactions: 


 Allergies











Allergy/AdvReac Type Severity Reaction Status Date / Time


 


No Known Drug Allergies Allergy   Verified 07/22/17 13:07














- Social History


Does the pt smoke?: No


Smoking Status: Former smoker


Does the pt drink ETOH?: No


Does the pt have substance abuse?: No





- Immunizations


Immunizations are current?: No


Immunizations: TDAP >10years/unknown





- POLST


Patient has POLST: No





PD ED PE NORMAL





- Vitals


Vital signs reviewed: Yes





- General


General: Alert and oriented X 3, No acute distress





- HEENT


HEENT: Moist mucous membranes





- Neck


Neck: Supple, no meningeal sign





- Cardiac


Cardiac: RRR, Strong equal pulses





- Respiratory


Respiratory: No respiratory distress, Clear bilaterally





- Abdomen


Abdomen: Soft, Other (TTP LLQ/suprapubic)





- Back


Back: No CVA TTP, No spinal TTP





- Derm


Derm: Warm and dry





- Neuro


Neuro: Alert and oriented X 3





- Psych


Psych: Normal mood, Normal affect





Results





- Vitals


Vitals: 


 Vital Signs - 24 hr











  07/22/17 07/22/17 07/22/17





  12:44 14:46 15:46


 


Temperature 36.8 C  


 


Heart Rate 135 H 115 H 110 H


 


Respiratory 22 22 20





Rate   


 


Blood Pressure 146/72 H 102/54 L 109/51 L


 


O2 Saturation 96 93 97














  07/22/17





  16:01


 


Temperature 36.1 C L


 


Heart Rate 


 


Respiratory 





Rate 


 


Blood Pressure 


 


O2 Saturation 








 Oxygen











O2 Source [With Activity]      Room air


 


O2 Source [Without Activity]   2L via NC


 


O2 Source                      Nasal cannula

















- Labs


Labs: 


 Laboratory Tests











  07/22/17 07/22/17 07/22/17





  13:30 13:30 13:40


 


WBC  15.3 H  


 


RBC  4.03 L  


 


Hgb  12.4 L  


 


Hct  36.7 L  


 


MCV  91.1  


 


MCH  30.8  


 


MCHC  33.9  


 


RDW  13.9  


 


Plt Count  290  


 


MPV  7.8  


 


Neut #  13.6 H  


 


Lymph #  0.8 L  


 


Mono #  0.7  


 


Eos #  0.2  


 


Baso #  0.1  


 


Absolute Nucleated RBC  0.02  


 


Nucleated RBCs  0.1  


 


Sodium   136 


 


Potassium   4.3 


 


Chloride   97 L 


 


Carbon Dioxide   29 


 


Anion Gap   10.0 


 


BUN   10 


 


Creatinine   0.7 


 


Estimated GFR (MDRD)   108 


 


Glucose   215 H 


 


Lactic Acid    1.7


 


Calcium   9.5 


 


Total Bilirubin   0.8 


 


AST   18 


 


ALT   12 


 


Alkaline Phosphatase   116 


 


Total Protein   7.4 


 


Albumin   3.6 


 


Globulin   3.8 


 


Albumin/Globulin Ratio   0.9 L 


 


Lipase   19 L 


 


Urine Color   


 


Urine Clarity   


 


Urine pH   


 


Ur Specific Gravity   


 


Urine Protein   


 


Urine Glucose (UA)   


 


Urine Ketones   


 


Urine Occult Blood   


 


Urine Nitrite   


 


Urine Bilirubin   


 


Urine Urobilinogen   


 


Ur Leukocyte Esterase   


 


Ur Microscopic Review   


 


Urine Culture Comments   














  07/22/17





  14:00


 


WBC 


 


RBC 


 


Hgb 


 


Hct 


 


MCV 


 


MCH 


 


MCHC 


 


RDW 


 


Plt Count 


 


MPV 


 


Neut # 


 


Lymph # 


 


Mono # 


 


Eos # 


 


Baso # 


 


Absolute Nucleated RBC 


 


Nucleated RBCs 


 


Sodium 


 


Potassium 


 


Chloride 


 


Carbon Dioxide 


 


Anion Gap 


 


BUN 


 


Creatinine 


 


Estimated GFR (MDRD) 


 


Glucose 


 


Lactic Acid 


 


Calcium 


 


Total Bilirubin 


 


AST 


 


ALT 


 


Alkaline Phosphatase 


 


Total Protein 


 


Albumin 


 


Globulin 


 


Albumin/Globulin Ratio 


 


Lipase 


 


Urine Color  YELLOW


 


Urine Clarity  CLOUDY


 


Urine pH  6.5


 


Ur Specific Gravity  1.020


 


Urine Protein  100 H


 


Urine Glucose (UA)  NEGATIVE


 


Urine Ketones  NEGATIVE


 


Urine Occult Blood  LARGE H


 


Urine Nitrite  POSITIVE H


 


Urine Bilirubin  NEGATIVE


 


Urine Urobilinogen  0.2 (NORMAL)


 


Ur Leukocyte Esterase  MODERATE H


 


Ur Microscopic Review  NOT INDICATED


 


Urine Culture Comments  INDICATED














- Rads (name of study)


  ** CT abd/pelvis


Radiology: Prelim report reviewed, EMP read contemporaneously, See rad report (

1. Colon diverticulosis and probable mild diverticulitis in the proximal mid 

sigmoid colon without drainable abscess. Normal appendix. Negative for bowel 

obstruction. 2. Bilateral intrarenal stones, greater in the right tibia without 

hydronephrosis. 3. Cholelithiasis without acute cholecystitis or bile duct 

dilatation. 4. Borderline to mild splenomegaly without cirrhosis or adenopathy, 

nonspecific finding. 5. There is a small to moderately-sized right inguinal 

indirect hernia containing fat with minimal fat stranding. 6. Small dependent 

pulmonary opacity in the bilateral lung bases, greater left base with small 

left pleural effusion, infiltrate process or pneumonia in the left lower lobe 

may have similar appearance. )





PD MEDICAL DECISION MAKING





- ED course


Complexity details: reviewed old records, reviewed results, re-evaluated 

patient (Abdomen remains mildly tender in the left lower quadrant where the 

diverticulitis was seen on CT scan.), considered differential, d/w patient, d/w 

consultant


ED course: 





Patient is an 83-year-old gentleman who presents to the emergency department 

with abdominal pain, tachycardia.  He was found to have urinary obstruction 

with a blocked catheter from pyuria.  This was attempted to be irrigated, but 

this did not work therefore a new catheter was placed and drained well.  His 

abdominal pain resolved, but his heart rate remained elevated at 110-115.  

Given IV fluids and did not change his tachycardia much.  Also found to be 

hypotensive, systolic around 100-110.  His lactate is normal.  We will admit 

him to the hospital for further evaluation and care.  He was started on 

Rocephin IV.  He was also found to have mild diverticulitis on the CT scan, 

therefore Flagyl was added to this.  Discussed the case with the hospitalist, 

Dr. Balbuena who accepts.





This document was made in part using voice recognition software. While efforts 

are made to proofread this document, sound alike and grammatical errors may 

occur.





Initially HR was charted at 96 upon arrival, after discussion with RN, it was 

updated to reflect his true HR of 135 at the time of presentation (1550). 





Departure





- Departure


Disposition: 66 [facility] DC/Xfer


Clinical Impression: 


 Acute urinary retention





UTI (urinary tract infection)


Qualifiers:


 Urinary tract infection type: acute cystitis Hematuria presence: without 

hematuria Qualified Code(s): N30.00 - Acute cystitis without hematuria





Sepsis


Qualifiers:


 Sepsis type: sepsis due to unspecified organism Qualified Code(s): A41.9 - 

Sepsis, unspecified organism





Diverticulitis


Qualifiers:


 Diverticulitis site: large intestine Diverticulitis bleeding: without bleeding 

Diverticulitis complication: without perforation or abscess Qualified Code(s): 

K57.32 - Diverticulitis of large intestine without perforation or abscess 

without bleeding


Condition: Stable

## 2017-07-22 NOTE — CT PRELIMINARY REPORT
Accession: [id number]

Exam: CT Abdomen/Pelvis W/

 

IMPRESSION: 

1. Colon diverticulosis and probable mild diverticulitis in the proximal mid sigmoid colon without dr
ainable abscess. Normal appendix. Negative for bowel obstruction. 

2. Bilateral intrarenal stones, greater in the right tibia without hydronephrosis. 

3. Cholelithiasis without acute cholecystitis or bile duct dilatation. 

4. Borderline to mild splenomegaly without cirrhosis or adenopathy, nonspecific finding. 

5. There is a small to moderately-sized right inguinal indirect hernia containing fat with minimal fa
t stranding. 

6. Small dependent pulmonary opacity in the bilateral lung bases, greater left base with small left p
leural effusion, infiltrate process or pneumonia in the left lower lobe may have similar appearance.

 

[facility]

 

SITE ID: [id number]

## 2017-07-22 NOTE — XRAY REPORT
EXAM:

CHEST RADIOGRAPHY

 

EXAM DATE: 7/22/2017 05:30 PM.

 

CLINICAL HISTORY: Cough.

 

COMPARISON: 01/15/2017 chest x-ray.

 

TECHNIQUE: 2 views.

 

FINDINGS: 

Lungs/Pleura: Scarring and/or atelectasis medially at the right lung base. The lungs are otherwise cl
ear. No pleural effusion or pneumothorax.

 

Mediastinum: Heart and mediastinal contours are unremarkable.

 

Other: None.

 

IMPRESSION: No acute cardiopulmonary process.

 

[facility]

Referring Provider Line: [phone]

 

SITE ID: [id number]

## 2017-07-22 NOTE — XRAY PRELIMINARY REPORT
Accession: [id number]

Exam: XR Chest 2 View PA/LAT

 

IMPRESSION: No acute cardiopulmonary process.

 

[facility]

 

SITE ID: [id number]

## 2017-07-22 NOTE — HISTORY & PHYSICAL EXAMINATION
Chief Complaint





- Chief Complaint


Chief Complaint: "did not feel so good for couple of days"





History of Present Illness





- Admitted From


Admitted From:: emergence department





- History Obtained From


History obtained from: patient





- History of Present Illness


HPI Comment/Other: 





This is a 83-year-old  male with a Past Medical History of hypertension

, COPD, DM2, chronic vision loss, chronic back pain, chronic shoulder pain, 

questionable congenital heart failure, who present emergence department for 

evaluation of severe pain in penis. Patient initially report he has been on 

illness and felt not good for couple days. Upon further interview, patient 

specifically report he had severe pain in his penis, especially when he 

urinates. The patient is not a good historian, he does not provide much 

information about exact questions. Patient did report he did not have short of 

breathing, chest pain, headache, nausea, vomiting, abdominal pain. He report 

loose stool but no diarrhea. He report his shoulder pain before he came into 

emergence department, now he feel better "after they put something on." 


Review of patient's ECHO study on 01/2016, it reveals mild concentric LVH with 

normal systolic function, EF 65%, There is mild diastolic dysfunction but left 

atrium is normal in size. Aortic and mitrial valves are normal function. Normal 

right ventricular size and function. Pulmonary pressure could not be 

determined. 


CT of abdomen on today reveals colon diverticulosis and probable mild 

diverticulitis in the proximal mild sigmoid colon without drainable abscess,  

Normal appendix, negative for bowel obstruction, bilateral intrarenal stones 

without hydronephrosis, Cholelithiasis without acute cholecystitis, small left 

pleural effusion, infiltrate process or pneumonia in the left lower lobe may be 

similar appearance.


CXR is pending.


UA reveals positive urine protein, urine occult blood, nitrite, and leukocyte 

esterase. Patient has been inserted Gupta catheter in the emergence department. 


Lab test reveals patient has elevated WBC to 15.3 and left shift. Glucose is 

215. The nearest vital sign are as the following: Temperature 36.1, heart rate 

110, blood pressure 109/51, RR 20, SO2 97% with 2 liter of O2.


Patient is admitted to treatment for UTI, diverticulitis and pneumonia 





Review of Systems





- Constitutional


Constitutional: reports: Fatigue.  denies: Fever, Chills, Malaise, Diaphoresis, 

Night sweats, Weight gain, Weight loss





- Eyes


Eyes: reports: Vision loss (chronic vision loss per patient report).  denies: 

Pain, Irritation, Blurred vision, Spots in vision, Dipolpia





- Ears, Nose & Throat


Ears, Nose & Throat: denies: Ear pain, Hearing loss, Tinnitus, Vertigo, Nasal 

discharge, Nosebleeds, Sore throat, Mouth lesions, Dental pain





- Cardiovascular


Cariovascular: denies: Irregular heart rate, Palpitations, Chest pain, Edema, 

Lightheadedness, Syncope, Exertional dyspnea, Orthopnea





- Respiratory


Respiratory: reports: Cough.  denies: Sputum production, Wheezing, Snoring, 

Hemoptysis, Orthopnea, SOB at rest





- Gastrointestinal


Gastrointestinal: reports: Constipation.  denies: Abdominal pain, Abdominal 

distention, Diarrhea, Change in bowel habits, Rectal bleeding, Black stools, 

Nausea, Vomiting, Carlos blood emesis





- Genitourinary


Genitourinary: reports: Dysuria, Frequency, Incontinence.  denies: Urgency, 

Hematuria, Flank pain, Nocturia, Urethral discharge





- Musculoskeletal


Musculoskeletal: reports: Joint pain (bilateral shoulder pain).  denies: Muscle 

pain, Back pain, Muscle aches, Stiffness, Limited range of motion, Joint 

swelling





- Integumentary


Integumentary: reports: Dryness.  denies: Rash, Pruritis, Nail changes





- Neurological


Neurological: reports: General weakness.  denies: Focal weakness, Headache, 

Dizziness, Numbness, Seizures, Slurred speech





- Psychiatric


Psychiatric: denies: Depression, Anxiety, Suicidal, Delusions, Hallucinations, 

Homicidal





- Endocrine


Endocrine: denies: Polyuria, Polydypsia, Polyphagia, Intolerance to cold, 

Intolerance to heat





- Hematologic/Lymphatic


Hematologic/Lymphatic: reports: Recurrent infections.  denies: Bruising, 

Petechiae, Blood clots, Lymphadenopathy





History





- Past Medical History


Cardiovascular: reports: Congestive heart failure, Hypertension


Respiratory: reports: COPD


Neuro: reports: None, Other


Endocrine/Autoimmune: reports: Type 2 diabetes


GI: reports: None


: reports: None


HEENT: reports: Chronic vision loss, Other


Psych: reports: None


Musculoskeletal: reports: Chronic back pain


Derm: reports: None


MRSA Hx?: No





- Past Surgical History


General: reports: Colonoscopy


HEENT: reports: Cataracts





- POLST


Patient has POLST: No


POLST Status: DNR





Meds/Allgy





- Home Medications


Home Medications: 


 Ambulatory Orders











 Medication  Instructions  Recorded  Confirmed


 


Latanoprost 1 drops EACHEYE QPM 09/12/16 07/22/17


 


Acetaminophen 325 mg PO Q4H PRN 11/26/16 07/22/17


 


Fluticasone/Salmeterol [Advair 1 inh INH BID 11/26/16 07/22/17





100-50 Diskus]   


 


Calcium Carbonate [Tums (Calcium 500 mg PO Q8HR PRN #0 tablet 01/18/17 07/22/17





Carbonate 500mg)]   


 


Clopidogrel [Plavix] 75 mg PO DAILY 30 Days 01/18/17 07/22/17


 


Gabapentin [Neurontin] 100 mg PO TID 07/22/17 07/22/17


 


Ipratropium/Albuterol [Duoneb] 3 ml INH Q6H PRN 07/22/17 07/22/17


 


Lorazepam 0.25 mg PO TID PRN 07/22/17 07/22/17


 


Mirtazapine [Remeron] 15 mg PO QPM 07/22/17 07/22/17


 


Tamsulosin HCl [Flomax] 0.4 mg PO QPM 07/22/17 07/22/17


 


Latanoprost 0.005% Ophth Drops 1 drops EACHEYE QPM #1 bottle 07/25/17 





[Xalatan Ophth Drops]   


 


Lidocaine Patch 5% [Lidoderm Patch] 2 patch TOP DAILY PRN #15 patch 07/25/17 














- Allergies


Allergies/Adverse Reactions: 


 Allergies











Allergy/AdvReac Type Severity Reaction Status Date / Time


 


No Known Drug Allergies Allergy   Verified 07/22/17 13:07














Exam





- Vital Signs


Vital Signs: 





 Vital Signs x48h











  Temp Pulse Resp BP Pulse Ox


 


 07/22/17 17:41   109 H  22   92


 


 07/22/17 16:01  36.1 C L    


 


 07/22/17 15:46   110 H  20  109/51 L  97


 


 07/22/17 14:46   115 H  22  102/54 L  93


 


 07/22/17 12:44  36.8 C  135 H  22  146/72 H  96














- Physical Exam


General Appearance: positive: No acute distress, Alert


Eyes Bilateral: positive: Normal inspection, PERRL


ENT: positive: ENT inspection nml, Pharynx nml, No signs of dehydration


Neck: positive: Nml inspection, Thyroid nml, No JVD, Trachea midline


Respiratory: positive: Chest non-tender, No respiratory distress, Other (some 

cracker sound at left lobe)


Cardiovascular: positive: Regular rate & rhythm, No murmur, No gallop, 

Tachycardia


Peripheral Pulses: positive: 2+


Abdomen: positive: Non-tender, No organomegaly, Nml bowel sounds, No distention


Back: positive: Nml inspection


Skin: positive: Color nml, No rash, Warm, Dry


Extremities: positive: Non-tender, Full ROM, Nml appearance


Neurologic/Psychiatric: positive: Oriented x3, Motor nml, Sensation nml, Mood/

affect nml





Conclusion/Plan





- Problem List


(1) Diverticulitis of sigmoid colon


Conclusion/Plan: 


Flagyl IV


follow up blood culture








(2) UTI (urinary tract infection) with pyuria


Conclusion/Plan: 


Unasyn can be used for both UTI and pneumonia


follow up with blood culture 








(3) Acute urinary retention


Conclusion/Plan: 


Gupta catheter, reconciliation of home Flomax








(4) Left lower lobe pneumonia


Conclusion/Plan: 


treat with Unasyn, follow up blood culture








(5) Sepsis associated hypotension


Conclusion/Plan: 


hydration, antibiotics. IVF and NS bolus as needed


on tele








(6) History of COPD


Conclusion/Plan: 


reconciliation of Duoneb PRN, albuteral PRN








(7) Hypotension


Conclusion/Plan: 


Patient at ER had BP 89/48, HR 99, Tem 37.2, RR 20, SO2 94% with 2L NC O2. 

Patient was given IVF bolus in ER. Patient will be closely monitored, mild to 

moderate IVF remain, check lactic acid, continue on antibiotic


Qualifiers: 


   Hypotension type: unspecified hypotension type   Qualified Code(s): I95.9 - 

Hypotension, unspecified   





- Lab Results


Fish Bones: 


 07/25/17 05:35





 07/25/17 05:35





Issues/Core Measures





- Anticipated LOS


Anticipated Stay Length: 2 or more midnights





- Issues


Hospital Issues and Management Plan: 


SCD and Lovenox for DVT prophylaxis








- DVT/VTE - Prophylaxis


VTE/DVT Device ordered at admit?: Yes

## 2017-07-23 LAB
ALBUMIN/GLOB SERPL: 1 {RATIO} (ref 1–2.2)
ANION GAP SERPL CALCULATED.4IONS-SCNC: 6 MMOL/L (ref 6–13)
BASOPHILS NFR BLD AUTO: 0.1 10^3/UL (ref 0–0.1)
BASOPHILS NFR BLD AUTO: 1.1 %
BILIRUB BLD-MCNC: 0.9 MG/DL (ref 0.2–1)
BUN SERPL-MCNC: 7 MG/DL (ref 6–20)
CALCIUM UR-MCNC: 8.7 MG/DL (ref 8.5–10.3)
CHLORIDE SERPL-SCNC: 104 MMOL/L (ref 101–111)
CO2 SERPL-SCNC: 30 MMOL/L (ref 21–32)
CREAT SERPLBLD-SCNC: 0.6 MG/DL (ref 0.6–1.2)
EOSINOPHIL # BLD AUTO: 0.6 10^3/UL (ref 0–0.7)
EOSINOPHIL NFR BLD AUTO: 6.2 %
ERYTHROCYTE [DISTWIDTH] IN BLOOD BY AUTOMATED COUNT: 14.3 % (ref 12–15)
GFRSERPLBLD MDRD-ARVRAT: 129 ML/MIN/{1.73_M2} (ref 89–?)
GLOBULIN SER-MCNC: 2.9 G/DL (ref 2.1–4.2)
GLUCOSE SERPL-MCNC: 123 MG/DL (ref 70–100)
HCT VFR BLD AUTO: 32.8 % (ref 42–52)
HGB UR QL STRIP: 11 G/DL (ref 14–18)
LYMPHOCYTES # SPEC AUTO: 0.9 10^3/UL (ref 1.5–3.5)
LYMPHOCYTES NFR BLD AUTO: 10.1 %
MCH RBC QN AUTO: 31.1 PG (ref 27–31)
MCHC RBC AUTO-ENTMCNC: 33.6 G/DL (ref 32–36)
MCV RBC AUTO: 92.6 FL (ref 80–94)
MONOCYTES # BLD AUTO: 0.6 10^3/UL (ref 0–1)
MONOCYTES NFR BLD AUTO: 6.6 %
NEUTROPHILS # BLD AUTO: 7 10^3/UL (ref 1.5–6.6)
NEUTROPHILS # SNV AUTO: 9.2 X10^3/UL (ref 4.8–10.8)
NEUTROPHILS NFR BLD AUTO: 76 %
NRBC # BLD AUTO: 0 /100WBC
PDW BLD AUTO: 8 FL (ref 7.4–11.4)
POTASSIUM SERPL-SCNC: 3.5 MMOL/L (ref 3.5–5)
PROT SPEC-MCNC: 5.8 G/DL (ref 6.7–8.2)
RBC MAR: 3.54 10^6/UL (ref 4.7–6.1)
SODIUM SERPLBLD-SCNC: 140 MMOL/L (ref 135–145)
WBC # BLD: 9.2 X10^3/UL

## 2017-07-23 RX ADMIN — MIRTAZAPINE SCH MG: 15 TABLET, FILM COATED ORAL at 20:48

## 2017-07-23 RX ADMIN — MORPHINE SULFATE PRN MG: 2 INJECTION, SOLUTION INTRAMUSCULAR; INTRAVENOUS at 07:44

## 2017-07-23 RX ADMIN — METRONIDAZOLE SCH MLS/HR: 500 INJECTION, SOLUTION INTRAVENOUS at 07:45

## 2017-07-23 RX ADMIN — FAMOTIDINE SCH MG: 20 TABLET, FILM COATED ORAL at 09:25

## 2017-07-23 RX ADMIN — DOCUSATE SODIUM SCH MG: 100 CAPSULE, LIQUID FILLED ORAL at 11:47

## 2017-07-23 RX ADMIN — SODIUM CHLORIDE SCH MLS/HR: 900 INJECTION INTRAVENOUS at 06:26

## 2017-07-23 RX ADMIN — ENOXAPARIN SODIUM SCH MG: 100 INJECTION SUBCUTANEOUS at 09:28

## 2017-07-23 RX ADMIN — FORMOTEROL FUMARATE DIHYDRATE SCH MCG: 20 SOLUTION RESPIRATORY (INHALATION) at 20:00

## 2017-07-23 RX ADMIN — SODIUM CHLORIDE SCH MLS/HR: 900 INJECTION INTRAVENOUS at 01:26

## 2017-07-23 RX ADMIN — SODIUM CHLORIDE, PRESERVATIVE FREE SCH: 5 INJECTION INTRAVENOUS at 06:29

## 2017-07-23 RX ADMIN — POLYETHYLENE GLYCOL 3350 SCH GM: 17 POWDER, FOR SOLUTION ORAL at 09:26

## 2017-07-23 RX ADMIN — SODIUM CHLORIDE SCH MLS/HR: 9 INJECTION, SOLUTION INTRAVENOUS at 06:26

## 2017-07-23 RX ADMIN — GABAPENTIN SCH MG: 100 CAPSULE ORAL at 14:36

## 2017-07-23 RX ADMIN — BUDESONIDE SCH MG: 0.5 SUSPENSION RESPIRATORY (INHALATION) at 12:45

## 2017-07-23 RX ADMIN — IPRATROPIUM BROMIDE AND ALBUTEROL SULFATE PRN ML: 2.5; .5 SOLUTION RESPIRATORY (INHALATION) at 07:15

## 2017-07-23 RX ADMIN — METRONIDAZOLE SCH MLS/HR: 500 INJECTION, SOLUTION INTRAVENOUS at 16:02

## 2017-07-23 RX ADMIN — METRONIDAZOLE SCH MLS/HR: 500 INJECTION, SOLUTION INTRAVENOUS at 23:40

## 2017-07-23 RX ADMIN — INSULIN ASPART SCH UNIT: 100 INJECTION, SOLUTION INTRAVENOUS; SUBCUTANEOUS at 09:42

## 2017-07-23 RX ADMIN — GABAPENTIN SCH MG: 100 CAPSULE ORAL at 06:26

## 2017-07-23 RX ADMIN — SODIUM CHLORIDE, PRESERVATIVE FREE SCH: 5 INJECTION INTRAVENOUS at 20:49

## 2017-07-23 RX ADMIN — ACETAMINOPHEN PRN MG: 325 TABLET ORAL at 11:43

## 2017-07-23 RX ADMIN — INSULIN ASPART SCH: 100 INJECTION, SOLUTION INTRAVENOUS; SUBCUTANEOUS at 20:42

## 2017-07-23 RX ADMIN — SODIUM CHLORIDE, PRESERVATIVE FREE SCH: 5 INJECTION INTRAVENOUS at 14:36

## 2017-07-23 RX ADMIN — CLOPIDOGREL BISULFATE SCH MG: 75 TABLET ORAL at 09:25

## 2017-07-23 RX ADMIN — BUDESONIDE SCH MG: 0.5 SUSPENSION RESPIRATORY (INHALATION) at 20:00

## 2017-07-23 RX ADMIN — SODIUM CHLORIDE SCH MLS/HR: 9 INJECTION, SOLUTION INTRAVENOUS at 23:41

## 2017-07-23 RX ADMIN — SODIUM CHLORIDE SCH MLS/HR: 9 INJECTION, SOLUTION INTRAVENOUS at 15:59

## 2017-07-23 RX ADMIN — TAMSULOSIN HYDROCHLORIDE SCH MG: 0.4 CAPSULE ORAL at 20:48

## 2017-07-23 RX ADMIN — INSULIN ASPART SCH UNIT: 100 INJECTION, SOLUTION INTRAVENOUS; SUBCUTANEOUS at 12:55

## 2017-07-23 RX ADMIN — FORMOTEROL FUMARATE DIHYDRATE SCH MCG: 20 SOLUTION RESPIRATORY (INHALATION) at 12:45

## 2017-07-23 RX ADMIN — INSULIN ASPART SCH: 100 INJECTION, SOLUTION INTRAVENOUS; SUBCUTANEOUS at 16:52

## 2017-07-23 RX ADMIN — LATANOPROST SCH DROPS: 50 SOLUTION OPHTHALMIC at 20:48

## 2017-07-23 RX ADMIN — GABAPENTIN SCH MG: 100 CAPSULE ORAL at 20:48

## 2017-07-23 NOTE — XRAY REPORT
EXAM:

CHEST RADIOGRAPHY

 

EXAM DATE: 7/23/2017 07:24 PM.

 

CLINICAL HISTORY: Short of breathing.

 

COMPARISON: None.

 

TECHNIQUE: 2 views.

 

FINDINGS: 

Lungs/Pleura: Small posterior sulcus effusions. The lungs are otherwise clear. No pneumothorax.

 

Mediastinum: Heart and mediastinal contours are unremarkable.

 

Other: None.

 

IMPRESSION: Small bilateral pleural effusions.

 

[facility]

Referring Provider Line: [phone]

 

SITE ID: [id number]

## 2017-07-23 NOTE — XRAY PRELIMINARY REPORT
Accession: [id number]

Exam: XR Chest 2 View PA/LAT

 

IMPRESSION: Small bilateral pleural effusions.

 

[facility]

 

SITE ID: [id number]

## 2017-07-23 NOTE — PROVIDER PROGRESS NOTE
Subjective





- Subjective


Pt reports feeling: Improved


Subjective: 


patient report he feel better. Patient report some pain on his bilateral 

shoulders.








Objective





- Vital Signs/Intake & Output


Vital Signs: 





 Vital Signs x48h











  Temp Pulse Pulse Resp BP Pulse Ox


 


 07/23/17 08:21  37.0 C   99  20  109/64  94


 


 07/23/17 07:15   96   24  


 


 07/23/17 04:55  37.2 C   98  18   94











Intake & Output: 





 Intake & Output











 07/20/17 07/21/17 07/22/17 07/23/17





 23:59 23:59 23:59 23:59


 


Intake Total   840 478


 


Output Total   850 700


 


Balance   -10 -222














- Objective


General Appearance: positive: No acute distress, Alert


Eyes Bilateral: positive: Normal inspection, PERRL


ENT: positive: ENT inspection nml, Pharynx nml, No signs of dehydration


Neck: positive: Nml inspection, Thyroid nml, Trachea midline


Respiratory: positive: Chest non-tender, No respiratory distress, Other (

diminished but no abnormality of sound)


Cardiovascular: positive: Regular rate & rhythm, No murmur


Peripheral Pulses: 2+ Radial (R), 2+ Radial (L), 2+ Dorsalis pedis (R), 2+ 

Dorsalis pedis (L)


Abdomen: positive: Non-tender, Nml bowel sounds, No distention


Back: positive: Nml inspection


Skin: positive: Color nml, Warm


Extremities: positive: Non-tender, Full ROM, Nml appearance


Neurologic/Psychiatric: positive: Oriented x3, Motor nml, Sensation nml, Mood/

affect nml





- Lab Results


Fish Bones: 


 07/25/17 05:35





 07/25/17 05:35


Other Labs: 





 Lab Results x24hrs











  07/23/17 07/23/17 07/23/17 Range/Units





  05:19 05:19 05:19 


 


WBC    9.2  (4.8-10.8)  x10^3/uL


 


RBC    3.54 L  (4.70-6.10)  10^6/uL


 


Hgb    11.0 L  (14.0-18.0)  g/dL


 


Hct    32.8 L  (42.0-52.0)  %


 


MCV    92.6  (80.0-94.0)  fL


 


MCH    31.1 H  (27.0-31.0)  pg


 


MCHC    33.6  (32.0-36.0)  g/dL


 


RDW    14.3  (12.0-15.0)  %


 


Plt Count    244  (130-450)  10^3/uL


 


MPV    8.0  (7.4-11.4)  fL


 


Neut #    7.0 H  (1.5-6.6)  10^3/uL


 


Lymph #    0.9 L  (1.5-3.5)  10^3/uL


 


Mono #    0.6  (0.0-1.0)  10^3/uL


 


Eos #    0.6  (0.0-0.7)  10^3/uL


 


Baso #    0.1  (0.0-0.1)  10^3/uL


 


Absolute Nucleated RBC    0.00  x10^3/uL


 


Nucleated RBCs    0.0  /100WBC


 


Sodium   140   (135-145)  mmol/L


 


Potassium   3.5   (3.5-5.0)  mmol/L


 


Chloride   104   (101-111)  mmol/L


 


Carbon Dioxide   30   (21-32)  mmol/L


 


Anion Gap   6.0   (6-13)  


 


BUN   7   (6-20)  mg/dL


 


Creatinine   0.6   (0.6-1.2)  mg/dL


 


Estimated GFR (MDRD)   129   (>89)  


 


Glucose   123 H   ()  mg/dL


 


Calcium   8.7   (8.5-10.3)  mg/dL


 


Magnesium  1.4 L    (1.7-2.8)  mg/dL


 


Total Bilirubin   0.9   (0.2-1.0)  mg/dL


 


AST   15   (10-42)  IU/L


 


ALT   10   (10-60)  IU/L


 


Alkaline Phosphatase   80   ()  IU/L


 


Total Protein   5.8 L   (6.7-8.2)  g/dL


 


Albumin   2.9 L   (3.2-5.5)  g/dL


 


Globulin   2.9   (2.1-4.2)  g/dL


 


Albumin/Globulin Ratio   1.0   (1.0-2.2)  














Assessment/Plan





- Problem List


(1) Diverticulitis of sigmoid colon


Impression: 


patient denies diarrhea, abdominal pain, nausea and vomiting.


continue to treat with flagyl, follow up culture








(2) UTI (urinary tract infection) with pyuria


Impression: 


patient with history PBH, urinary retention, UTI. follow up UA culture, use 

Levaquin, more specific for UTI, instead of Unasyn. Follow up culture 








(3) Acute urinary retention


Impression: 


history of urinary retention, BPH, Gupta Catheter. Continue on Gupta, follow up 

the protocol to remove the Gupta. reconciliation of BPH medication.








(4) Left lower lobe pneumonia


Impression: 


CT reveals pneumonia, CXR indicate no acute process. Patient has dry cough, 

feel not well, no fever but at high limited temperatures board, history of 

multiple times of pneumonia, came from nursing home, still continue to treat 

with pneumonia with Levaquin now. Follow up blood culture. Patient was bed-

bound in nursing home, but will try PT as the best.








(5) Sepsis associated hypotension


Impression: 


patient is hemodynamically stable now, will continue IV of NS, antibiotics, 

follow up blood culture, closely monitor


nurse report patient has lower BP, patient had 1.5 liter NS bolus. 








(6) History of COPD


Impression: 


patient with history of end stage of COPD, SO2 stable, no obvious respiratory 

distress now. reconciliaition of home INH treatment, continue to treat, consult 

with RT 








(7) Shortness of breath


Impression: 


patient developed shortness of breath, order CXR

## 2017-07-24 LAB
ALBUMIN/GLOB SERPL: 0.9 {RATIO} (ref 1–2.2)
ANION GAP SERPL CALCULATED.4IONS-SCNC: 7 MMOL/L (ref 6–13)
BASOPHILS NFR BLD AUTO: 0 10^3/UL (ref 0–0.1)
BASOPHILS NFR BLD AUTO: 0.3 %
BILIRUB BLD-MCNC: 1 MG/DL (ref 0.2–1)
BUN SERPL-MCNC: 7 MG/DL (ref 6–20)
CALCIUM UR-MCNC: 8.2 MG/DL (ref 8.5–10.3)
CHLORIDE SERPL-SCNC: 105 MMOL/L (ref 101–111)
CO2 SERPL-SCNC: 27 MMOL/L (ref 21–32)
CREAT SERPLBLD-SCNC: 0.8 MG/DL (ref 0.6–1.2)
EOSINOPHIL # BLD AUTO: 0.3 10^3/UL (ref 0–0.7)
EOSINOPHIL NFR BLD AUTO: 4 %
ERYTHROCYTE [DISTWIDTH] IN BLOOD BY AUTOMATED COUNT: 14 % (ref 12–15)
GFRSERPLBLD MDRD-ARVRAT: 92 ML/MIN/{1.73_M2} (ref 89–?)
GLOBULIN SER-MCNC: 3.1 G/DL (ref 2.1–4.2)
GLUCOSE SERPL-MCNC: 148 MG/DL (ref 70–100)
HCT VFR BLD AUTO: 31.8 % (ref 42–52)
HGB UR QL STRIP: 10.6 G/DL (ref 14–18)
LYMPHOCYTES # SPEC AUTO: 1 10^3/UL (ref 1.5–3.5)
LYMPHOCYTES NFR BLD AUTO: 12.7 %
MCH RBC QN AUTO: 30.9 PG (ref 27–31)
MCHC RBC AUTO-ENTMCNC: 33.2 G/DL (ref 32–36)
MCV RBC AUTO: 93.1 FL (ref 80–94)
MONOCYTES # BLD AUTO: 0.6 10^3/UL (ref 0–1)
MONOCYTES NFR BLD AUTO: 7.9 %
NEUTROPHILS # BLD AUTO: 6.1 10^3/UL (ref 1.5–6.6)
NEUTROPHILS # SNV AUTO: 8.2 X10^3/UL (ref 4.8–10.8)
NEUTROPHILS NFR BLD AUTO: 75.1 %
NRBC # BLD AUTO: 0.1 /100WBC
PDW BLD AUTO: 7.7 FL (ref 7.4–11.4)
POTASSIUM SERPL-SCNC: 3.4 MMOL/L (ref 3.5–5)
PROT SPEC-MCNC: 6 G/DL (ref 6.7–8.2)
RBC MAR: 3.42 10^6/UL (ref 4.7–6.1)
SODIUM SERPLBLD-SCNC: 139 MMOL/L (ref 135–145)
TSH SERPL-ACNC: 4.95 UIU/ML (ref 0.34–5.6)
VIT B12 SERPL-MCNC: 200 PG/ML (ref 180–914)
WBC # BLD: 8.2 X10^3/UL

## 2017-07-24 RX ADMIN — METRONIDAZOLE SCH MLS/HR: 500 INJECTION, SOLUTION INTRAVENOUS at 17:06

## 2017-07-24 RX ADMIN — SODIUM CHLORIDE, PRESERVATIVE FREE PRN ML: 5 INJECTION INTRAVENOUS at 23:28

## 2017-07-24 RX ADMIN — CLOPIDOGREL BISULFATE SCH MG: 75 TABLET ORAL at 08:01

## 2017-07-24 RX ADMIN — BUDESONIDE SCH MG: 0.5 SUSPENSION RESPIRATORY (INHALATION) at 20:49

## 2017-07-24 RX ADMIN — INSULIN ASPART SCH: 100 INJECTION, SOLUTION INTRAVENOUS; SUBCUTANEOUS at 17:04

## 2017-07-24 RX ADMIN — IPRATROPIUM BROMIDE AND ALBUTEROL SULFATE SCH ML: 2.5; .5 SOLUTION RESPIRATORY (INHALATION) at 20:49

## 2017-07-24 RX ADMIN — SODIUM CHLORIDE, PRESERVATIVE FREE SCH ML: 5 INJECTION INTRAVENOUS at 20:15

## 2017-07-24 RX ADMIN — GABAPENTIN SCH: 100 CAPSULE ORAL at 05:38

## 2017-07-24 RX ADMIN — METRONIDAZOLE SCH MLS/HR: 500 INJECTION, SOLUTION INTRAVENOUS at 23:27

## 2017-07-24 RX ADMIN — SODIUM CHLORIDE, PRESERVATIVE FREE PRN ML: 5 INJECTION INTRAVENOUS at 21:39

## 2017-07-24 RX ADMIN — TAMSULOSIN HYDROCHLORIDE SCH MG: 0.4 CAPSULE ORAL at 20:14

## 2017-07-24 RX ADMIN — INSULIN ASPART SCH: 100 INJECTION, SOLUTION INTRAVENOUS; SUBCUTANEOUS at 11:25

## 2017-07-24 RX ADMIN — INSULIN ASPART SCH: 100 INJECTION, SOLUTION INTRAVENOUS; SUBCUTANEOUS at 20:39

## 2017-07-24 RX ADMIN — BUDESONIDE SCH: 0.5 SUSPENSION RESPIRATORY (INHALATION) at 10:49

## 2017-07-24 RX ADMIN — MORPHINE SULFATE PRN MG: 2 INJECTION, SOLUTION INTRAMUSCULAR; INTRAVENOUS at 21:38

## 2017-07-24 RX ADMIN — GABAPENTIN SCH MG: 100 CAPSULE ORAL at 13:30

## 2017-07-24 RX ADMIN — LORAZEPAM PRN MG: 2 INJECTION, SOLUTION INTRAMUSCULAR; INTRAVENOUS at 03:21

## 2017-07-24 RX ADMIN — MORPHINE SULFATE PRN MG: 2 INJECTION, SOLUTION INTRAMUSCULAR; INTRAVENOUS at 01:51

## 2017-07-24 RX ADMIN — METRONIDAZOLE SCH MLS/HR: 500 INJECTION, SOLUTION INTRAVENOUS at 07:37

## 2017-07-24 RX ADMIN — POLYETHYLENE GLYCOL 3350 SCH GM: 17 POWDER, FOR SOLUTION ORAL at 08:02

## 2017-07-24 RX ADMIN — SODIUM CHLORIDE, PRESERVATIVE FREE SCH: 5 INJECTION INTRAVENOUS at 12:50

## 2017-07-24 RX ADMIN — DOCUSATE SODIUM SCH MG: 100 CAPSULE, LIQUID FILLED ORAL at 08:01

## 2017-07-24 RX ADMIN — SODIUM CHLORIDE, PRESERVATIVE FREE PRN ML: 5 INJECTION INTRAVENOUS at 12:29

## 2017-07-24 RX ADMIN — BUDESONIDE SCH MG: 0.5 SUSPENSION RESPIRATORY (INHALATION) at 12:29

## 2017-07-24 RX ADMIN — FAMOTIDINE SCH MG: 20 TABLET, FILM COATED ORAL at 08:01

## 2017-07-24 RX ADMIN — LORAZEPAM PRN MG: 2 INJECTION, SOLUTION INTRAMUSCULAR; INTRAVENOUS at 13:27

## 2017-07-24 RX ADMIN — LIDOCAINE PRN PATCH: 50 PATCH TOPICAL at 12:54

## 2017-07-24 RX ADMIN — ENOXAPARIN SODIUM SCH MG: 100 INJECTION SUBCUTANEOUS at 08:42

## 2017-07-24 RX ADMIN — LATANOPROST SCH DROPS: 50 SOLUTION OPHTHALMIC at 20:14

## 2017-07-24 RX ADMIN — MIRTAZAPINE SCH MG: 15 TABLET, FILM COATED ORAL at 20:14

## 2017-07-24 RX ADMIN — FORMOTEROL FUMARATE DIHYDRATE SCH MCG: 20 SOLUTION RESPIRATORY (INHALATION) at 12:29

## 2017-07-24 RX ADMIN — SODIUM CHLORIDE SCH MLS/HR: 900 INJECTION INTRAVENOUS at 19:42

## 2017-07-24 RX ADMIN — FORMOTEROL FUMARATE DIHYDRATE SCH: 20 SOLUTION RESPIRATORY (INHALATION) at 10:49

## 2017-07-24 RX ADMIN — GABAPENTIN SCH MG: 100 CAPSULE ORAL at 20:14

## 2017-07-24 RX ADMIN — IPRATROPIUM BROMIDE AND ALBUTEROL SULFATE PRN ML: 2.5; .5 SOLUTION RESPIRATORY (INHALATION) at 12:30

## 2017-07-24 RX ADMIN — INSULIN ASPART SCH: 100 INJECTION, SOLUTION INTRAVENOUS; SUBCUTANEOUS at 07:32

## 2017-07-24 RX ADMIN — LORAZEPAM PRN MG: 2 INJECTION, SOLUTION INTRAMUSCULAR; INTRAVENOUS at 12:29

## 2017-07-24 RX ADMIN — SODIUM CHLORIDE, PRESERVATIVE FREE SCH: 5 INJECTION INTRAVENOUS at 05:05

## 2017-07-24 NOTE — PROVIDER PROGRESS NOTE
Subjective





- Prog Note Date


Prog Note Date: 07/24/17


Prog Note Time: 10:59





- Subjective


Pt reports feeling: Improved


Subjective: 


patient report he is better. patient cooperate to answer questions. Patient has 

a very good appetite. Patient was reported to have some confuse on last night, 

and attempted to pull out IV.








Objective





- Vital Signs/Intake & Output


Vital Signs: 





 Vital Signs x48h











  Temp Pulse Resp BP Pulse Ox


 


 07/24/17 08:33  37.2 C  110 H  16  138/75 H  94


 


 07/24/17 05:25  36.2 C L  71  18  132/68 H  93











Intake & Output: 





 Intake & Output











 07/21/17 07/22/17 07/23/17 07/24/17





 23:59 23:59 23:59 23:59


 


Intake Total  840 2502 2257


 


Output Total  850 1550 2725


 


Balance  -10 421 -720














- Objective


General Appearance: positive: No acute distress, Alert


Eyes Bilateral: positive: Normal inspection, PERRL


ENT: positive: ENT inspection nml, Pharynx nml, No signs of dehydration


Neck: positive: Nml inspection, Thyroid nml, Trachea midline


Respiratory: positive: Chest non-tender, No respiratory distress, Other (

breathing sound is diminished more at right side, no rhonchi, or cracker sound)


Cardiovascular: positive: Regular rate & rhythm, No murmur, No gallop


Peripheral Pulses: 2+ Radial (R), 2+ Radial (L), 2+ Dorsalis pedis (R), 2+ 

Dorsalis pedis (L)


Abdomen: positive: Non-tender, Nml bowel sounds, No distention


Back: positive: Nml inspection


Skin: positive: Color nml, Warm, Dry


Extremities: positive: Non-tender, Full ROM, Nml appearance


Neurologic/Psychiatric: positive: Oriented x3 (oriented X1 as his basal line), 

Motor nml, Sensation nml





- Lab Results


Fish Bones: 


 07/25/17 05:35





 07/25/17 05:35


Other Labs: 





 Lab Results x24hrs











  07/24/17 07/24/17 07/24/17 Range/Units





  07:27 05:33 05:33 


 


WBC     (4.8-10.8)  x10^3/uL


 


RBC     (4.70-6.10)  10^6/uL


 


Hgb     (14.0-18.0)  g/dL


 


Hct     (42.0-52.0)  %


 


MCV     (80.0-94.0)  fL


 


MCH     (27.0-31.0)  pg


 


MCHC     (32.0-36.0)  g/dL


 


RDW     (12.0-15.0)  %


 


Plt Count     (130-450)  10^3/uL


 


MPV     (7.4-11.4)  fL


 


Neut #     (1.5-6.6)  10^3/uL


 


Lymph #     (1.5-3.5)  10^3/uL


 


Mono #     (0.0-1.0)  10^3/uL


 


Eos #     (0.0-0.7)  10^3/uL


 


Baso #     (0.0-0.1)  10^3/uL


 


Absolute Nucleated RBC     x10^3/uL


 


Nucleated RBCs     /100WBC


 


Sodium    139  (135-145)  mmol/L


 


Potassium    3.4 L  (3.5-5.0)  mmol/L


 


Chloride    105  (101-111)  mmol/L


 


Carbon Dioxide    27  (21-32)  mmol/L


 


Anion Gap    7.0  (6-13)  


 


BUN    7  (6-20)  mg/dL


 


Creatinine    0.8  (0.6-1.2)  mg/dL


 


Estimated GFR (MDRD)    92  (>89)  


 


Glucose    148 H  ()  mg/dL


 


POC Whole Bld Glucose  128 H    (70 - 100)  mg/dL


 


Lactic Acid     (0.5-2.2)  mmol/L


 


Calcium    8.2 L  (8.5-10.3)  mg/dL


 


Magnesium   1.4 L   (1.7-2.8)  mg/dL


 


Total Bilirubin    1.0  (0.2-1.0)  mg/dL


 


AST    16  (10-42)  IU/L


 


ALT    10  (10-60)  IU/L


 


Alkaline Phosphatase    86  ()  IU/L


 


B-Natriuretic Peptide     (5-100)  pg/mL


 


Total Protein    6.0 L  (6.7-8.2)  g/dL


 


Albumin    2.9 L  (3.2-5.5)  g/dL


 


Globulin    3.1  (2.1-4.2)  g/dL


 


Albumin/Globulin Ratio    0.9 L  (1.0-2.2)  














  07/24/17 07/23/17 07/23/17 Range/Units





  05:33 16:24 16:02 


 


WBC  8.2    (4.8-10.8)  x10^3/uL


 


RBC  3.42 L    (4.70-6.10)  10^6/uL


 


Hgb  10.6 L    (14.0-18.0)  g/dL


 


Hct  31.8 L    (42.0-52.0)  %


 


MCV  93.1    (80.0-94.0)  fL


 


MCH  30.9    (27.0-31.0)  pg


 


MCHC  33.2    (32.0-36.0)  g/dL


 


RDW  14.0    (12.0-15.0)  %


 


Plt Count  228    (130-450)  10^3/uL


 


MPV  7.7    (7.4-11.4)  fL


 


Neut #  6.1    (1.5-6.6)  10^3/uL


 


Lymph #  1.0 L    (1.5-3.5)  10^3/uL


 


Mono #  0.6    (0.0-1.0)  10^3/uL


 


Eos #  0.3    (0.0-0.7)  10^3/uL


 


Baso #  0.0    (0.0-0.1)  10^3/uL


 


Absolute Nucleated RBC  0.01    x10^3/uL


 


Nucleated RBCs  0.1    /100WBC


 


Sodium     (135-145)  mmol/L


 


Potassium     (3.5-5.0)  mmol/L


 


Chloride     (101-111)  mmol/L


 


Carbon Dioxide     (21-32)  mmol/L


 


Anion Gap     (6-13)  


 


BUN     (6-20)  mg/dL


 


Creatinine     (0.6-1.2)  mg/dL


 


Estimated GFR (MDRD)     (>89)  


 


Glucose     ()  mg/dL


 


POC Whole Bld Glucose   100   (70 - 100)  mg/dL


 


Lactic Acid    1.0  (0.5-2.2)  mmol/L


 


Calcium     (8.5-10.3)  mg/dL


 


Magnesium     (1.7-2.8)  mg/dL


 


Total Bilirubin     (0.2-1.0)  mg/dL


 


AST     (10-42)  IU/L


 


ALT     (10-60)  IU/L


 


Alkaline Phosphatase     ()  IU/L


 


B-Natriuretic Peptide     (5-100)  pg/mL


 


Total Protein     (6.7-8.2)  g/dL


 


Albumin     (3.2-5.5)  g/dL


 


Globulin     (2.1-4.2)  g/dL


 


Albumin/Globulin Ratio     (1.0-2.2)  














  07/23/17 07/23/17 Range/Units





  15:19 11:00 


 


WBC    (4.8-10.8)  x10^3/uL


 


RBC    (4.70-6.10)  10^6/uL


 


Hgb    (14.0-18.0)  g/dL


 


Hct    (42.0-52.0)  %


 


MCV    (80.0-94.0)  fL


 


MCH    (27.0-31.0)  pg


 


MCHC    (32.0-36.0)  g/dL


 


RDW    (12.0-15.0)  %


 


Plt Count    (130-450)  10^3/uL


 


MPV    (7.4-11.4)  fL


 


Neut #    (1.5-6.6)  10^3/uL


 


Lymph #    (1.5-3.5)  10^3/uL


 


Mono #    (0.0-1.0)  10^3/uL


 


Eos #    (0.0-0.7)  10^3/uL


 


Baso #    (0.0-0.1)  10^3/uL


 


Absolute Nucleated RBC    x10^3/uL


 


Nucleated RBCs    /100WBC


 


Sodium    (135-145)  mmol/L


 


Potassium    (3.5-5.0)  mmol/L


 


Chloride    (101-111)  mmol/L


 


Carbon Dioxide    (21-32)  mmol/L


 


Anion Gap    (6-13)  


 


BUN    (6-20)  mg/dL


 


Creatinine    (0.6-1.2)  mg/dL


 


Estimated GFR (MDRD)    (>89)  


 


Glucose    ()  mg/dL


 


POC Whole Bld Glucose   206 H  (70 - 100)  mg/dL


 


Lactic Acid    (0.5-2.2)  mmol/L


 


Calcium    (8.5-10.3)  mg/dL


 


Magnesium    (1.7-2.8)  mg/dL


 


Total Bilirubin    (0.2-1.0)  mg/dL


 


AST    (10-42)  IU/L


 


ALT    (10-60)  IU/L


 


Alkaline Phosphatase    ()  IU/L


 


B-Natriuretic Peptide  40   (5-100)  pg/mL


 


Total Protein    (6.7-8.2)  g/dL


 


Albumin    (3.2-5.5)  g/dL


 


Globulin    (2.1-4.2)  g/dL


 


Albumin/Globulin Ratio    (1.0-2.2)  














Assessment/Plan





- Problem List


(1) Diverticulitis of sigmoid colon


Impression: 


continue to treat with flagyl. no diarrhea or abdominal pain is reported








(2) UTI (urinary tract infection) with pyuria


Impression: 


continue to treat with Levoquin, follow UA culture











(4) Left lower lobe pneumonia


Impression: 


continue to treat with Levoquin, follow up blood culture. Stop IVF, CXR reveals 

patient has bilateral small effusion








(5) Sepsis associated hypotension


Impression: 


patient is reported to have lower SBP yesterday. 1.5 liter NS was prescribed. 

Patient's SBP is around 130. Patient is well responded to fluid hydration. 

Since patient developed small bilateral pleural effusion, stop the IVF. will 

closely monitor, Q4H vital recheck 








(6) History of COPD


Impression: 


patient's SO2 is around 96% at 2 L O2, no acute distress now. continue the 

current medical management.








(7) Slurred speech


Impression: 


CT of head. Patient is no other focus neurological deficits 








(8) Confused


Impression: 


stop Ativan. test of Ammonia, B12, TSH. closely monitor, neurological check 








(9) Acute sinusitis


Impression: 


CT of head reveals acute sinusitis. Switch pt's antibiotics Levaquin to Unasyn, 

since patient is confused as well.

## 2017-07-24 NOTE — CT PRELIMINARY REPORT
Accession: [id number]

Exam: CT Head W/O Stroke Protocol

 

IMPRESSION: 

 

1. No definite acute infarct, intracranial hemorrhage, mass, or hydrocephalus.

 

2. Mild-to-moderate white matter changes that appear similar to CT 1/12/2017 and may represent sequel
a of chronic vessel ischemic disease. If there is clinical concern for acute stroke or clinical sympt
oms persist an MR brain without contrast can be considered to evaluate for small or subtle infarct.

 

3. Large volume left maxillary sinus and right sphenoid sinus air-fluid levels that may represent acu
te sinusitis in the proper clinical setting. 

 

[facility]

 

The call report notification system was initiated by Dr. Adam Garcia at 14:46 hrs on 7/2
4/17.

 

The above findings  were discussed with Ed Cortez by Dr. Adam Garcia at 14:52 hrs on 7/
24/17.

 

SITE ID: [id number]

## 2017-07-25 LAB
ABG ANALYSIS TIME: 1210
ABG ROOM AIR: YES
ABG SITE OF DRAW: (no result)
ALBUMIN/GLOB SERPL: 0.9 {RATIO} (ref 1–2.2)
ANION GAP SERPL CALCULATED.4IONS-SCNC: 7 MMOL/L (ref 6–13)
ARTERIAL PATENCY WRIST A: (no result)
BASE EXCESS BLDMV CALC-SCNC: 4.6 MMOL/L (ref -2–3)
BASOPHILS NFR BLD AUTO: 0 10^3/UL (ref 0–0.1)
BASOPHILS NFR BLD AUTO: 0.5 %
BILIRUB BLD-MCNC: 1.3 MG/DL (ref 0.2–1)
BUN SERPL-MCNC: 5 MG/DL (ref 6–20)
CALCIUM UR-MCNC: 8.2 MG/DL (ref 8.5–10.3)
CHLORIDE SERPL-SCNC: 103 MMOL/L (ref 101–111)
CO2 BLDA CALC-SCNC: 30.9 MMOL/L (ref 21–29)
CO2 SERPL-SCNC: 29 MMOL/L (ref 21–32)
CREAT SERPLBLD-SCNC: 0.6 MG/DL (ref 0.6–1.2)
DEPRECATED HCO3 PLAS-SCNC: 29.5 MMOL/L (ref 22–26)
EOSINOPHIL # BLD AUTO: 0.4 10^3/UL (ref 0–0.7)
EOSINOPHIL NFR BLD AUTO: 6.7 %
ERYTHROCYTE [DISTWIDTH] IN BLOOD BY AUTOMATED COUNT: 13.9 % (ref 12–15)
GFRSERPLBLD MDRD-ARVRAT: 129 ML/MIN/{1.73_M2} (ref 89–?)
GLOBULIN SER-MCNC: 3 G/DL (ref 2.1–4.2)
GLUCOSE SERPL-MCNC: 95 MG/DL (ref 70–100)
HCT VFR BLD AUTO: 31.2 % (ref 42–52)
HGB UR QL STRIP: 10.6 G/DL (ref 14–18)
LYMPHOCYTES # SPEC AUTO: 0.9 10^3/UL (ref 1.5–3.5)
LYMPHOCYTES NFR BLD AUTO: 13.6 %
MCH RBC QN AUTO: 31.5 PG (ref 27–31)
MCHC RBC AUTO-ENTMCNC: 33.9 G/DL (ref 32–36)
MCV RBC AUTO: 93 FL (ref 80–94)
MONOCYTES # BLD AUTO: 0.7 10^3/UL (ref 0–1)
MONOCYTES NFR BLD AUTO: 10.8 %
NEUTROPHILS # BLD AUTO: 4.4 10^3/UL (ref 1.5–6.6)
NEUTROPHILS # SNV AUTO: 6.4 X10^3/UL (ref 4.8–10.8)
NEUTROPHILS NFR BLD AUTO: 68.4 %
NRBC # BLD AUTO: 0 /100WBC
PCO2 TEMP ADJ BLDCOA: 45 MMHG (ref 34–45)
PDW BLD AUTO: 7.9 FL (ref 7.4–11.4)
PH TEMP ADJ BLDA: 7.43 [PH] (ref 7.35–7.45)
PO2 TEMP ADJ BLDCOA: 51 MMHG (ref 80–100)
POTASSIUM SERPL-SCNC: 3.5 MMOL/L (ref 3.5–5)
PROT SPEC-MCNC: 5.8 G/DL (ref 6.7–8.2)
RBC MAR: 3.35 10^6/UL (ref 4.7–6.1)
SAO2 % BLDA FROM PO2: 88 % (ref 94–98)
SODIUM SERPLBLD-SCNC: 139 MMOL/L (ref 135–145)
WBC # BLD: 6.4 X10^3/UL

## 2017-07-25 RX ADMIN — FORMOTEROL FUMARATE DIHYDRATE SCH: 20 SOLUTION RESPIRATORY (INHALATION) at 07:22

## 2017-07-25 RX ADMIN — SODIUM CHLORIDE SCH MLS/HR: 900 INJECTION INTRAVENOUS at 07:42

## 2017-07-25 RX ADMIN — SODIUM CHLORIDE, PRESERVATIVE FREE SCH ML: 5 INJECTION INTRAVENOUS at 18:14

## 2017-07-25 RX ADMIN — MORPHINE SULFATE PRN MG: 2 INJECTION, SOLUTION INTRAMUSCULAR; INTRAVENOUS at 01:35

## 2017-07-25 RX ADMIN — INSULIN ASPART SCH: 100 INJECTION, SOLUTION INTRAVENOUS; SUBCUTANEOUS at 07:46

## 2017-07-25 RX ADMIN — GABAPENTIN SCH MG: 100 CAPSULE ORAL at 14:17

## 2017-07-25 RX ADMIN — ENOXAPARIN SODIUM SCH MG: 100 INJECTION SUBCUTANEOUS at 08:38

## 2017-07-25 RX ADMIN — LATANOPROST SCH DROPS: 50 SOLUTION OPHTHALMIC at 21:16

## 2017-07-25 RX ADMIN — SODIUM CHLORIDE, PRESERVATIVE FREE PRN ML: 5 INJECTION INTRAVENOUS at 19:58

## 2017-07-25 RX ADMIN — TAMSULOSIN HYDROCHLORIDE SCH MG: 0.4 CAPSULE ORAL at 21:15

## 2017-07-25 RX ADMIN — MIRTAZAPINE SCH MG: 15 TABLET, FILM COATED ORAL at 21:16

## 2017-07-25 RX ADMIN — IPRATROPIUM BROMIDE AND ALBUTEROL SULFATE SCH ML: 2.5; .5 SOLUTION RESPIRATORY (INHALATION) at 07:49

## 2017-07-25 RX ADMIN — INSULIN ASPART SCH UNIT: 100 INJECTION, SOLUTION INTRAVENOUS; SUBCUTANEOUS at 17:10

## 2017-07-25 RX ADMIN — IPRATROPIUM BROMIDE AND ALBUTEROL SULFATE SCH ML: 2.5; .5 SOLUTION RESPIRATORY (INHALATION) at 11:22

## 2017-07-25 RX ADMIN — FORMOTEROL FUMARATE DIHYDRATE SCH: 20 SOLUTION RESPIRATORY (INHALATION) at 20:10

## 2017-07-25 RX ADMIN — GABAPENTIN SCH MG: 100 CAPSULE ORAL at 05:38

## 2017-07-25 RX ADMIN — SODIUM CHLORIDE, PRESERVATIVE FREE SCH ML: 5 INJECTION INTRAVENOUS at 05:38

## 2017-07-25 RX ADMIN — BUDESONIDE SCH MG: 0.5 SUSPENSION RESPIRATORY (INHALATION) at 07:49

## 2017-07-25 RX ADMIN — MORPHINE SULFATE PRN MG: 2 INJECTION, SOLUTION INTRAMUSCULAR; INTRAVENOUS at 09:21

## 2017-07-25 RX ADMIN — METRONIDAZOLE SCH MLS/HR: 500 INJECTION, SOLUTION INTRAVENOUS at 08:44

## 2017-07-25 RX ADMIN — Medication SCH UNIT: at 21:16

## 2017-07-25 RX ADMIN — FAMOTIDINE SCH MG: 20 TABLET, FILM COATED ORAL at 08:38

## 2017-07-25 RX ADMIN — IPRATROPIUM BROMIDE AND ALBUTEROL SULFATE SCH ML: 2.5; .5 SOLUTION RESPIRATORY (INHALATION) at 20:00

## 2017-07-25 RX ADMIN — ACETAMINOPHEN PRN MG: 325 TABLET ORAL at 13:15

## 2017-07-25 RX ADMIN — IPRATROPIUM BROMIDE AND ALBUTEROL SULFATE SCH ML: 2.5; .5 SOLUTION RESPIRATORY (INHALATION) at 15:07

## 2017-07-25 RX ADMIN — CLOPIDOGREL BISULFATE SCH MG: 75 TABLET ORAL at 08:38

## 2017-07-25 RX ADMIN — INSULIN ASPART SCH: 100 INJECTION, SOLUTION INTRAVENOUS; SUBCUTANEOUS at 13:12

## 2017-07-25 RX ADMIN — POLYETHYLENE GLYCOL 3350 SCH GM: 17 POWDER, FOR SOLUTION ORAL at 08:39

## 2017-07-25 RX ADMIN — FORMOTEROL FUMARATE DIHYDRATE SCH MCG: 20 SOLUTION RESPIRATORY (INHALATION) at 07:49

## 2017-07-25 RX ADMIN — GABAPENTIN SCH MG: 100 CAPSULE ORAL at 22:37

## 2017-07-25 RX ADMIN — LINEZOLID SCH MLS/HR: 600 INJECTION, SOLUTION INTRAVENOUS at 13:15

## 2017-07-25 RX ADMIN — DOCUSATE SODIUM SCH MG: 100 CAPSULE, LIQUID FILLED ORAL at 08:38

## 2017-07-25 RX ADMIN — METRONIDAZOLE SCH MLS/HR: 500 INJECTION, SOLUTION INTRAVENOUS at 16:36

## 2017-07-25 RX ADMIN — FUROSEMIDE SCH MG: 10 INJECTION, SOLUTION INTRAVENOUS at 19:58

## 2017-07-25 RX ADMIN — INSULIN ASPART SCH: 100 INJECTION, SOLUTION INTRAVENOUS; SUBCUTANEOUS at 22:32

## 2017-07-25 RX ADMIN — MORPHINE SULFATE PRN MG: 2 INJECTION, SOLUTION INTRAMUSCULAR; INTRAVENOUS at 18:23

## 2017-07-25 RX ADMIN — MORPHINE SULFATE PRN MG: 2 INJECTION, SOLUTION INTRAMUSCULAR; INTRAVENOUS at 12:08

## 2017-07-25 RX ADMIN — SODIUM CHLORIDE, PRESERVATIVE FREE SCH ML: 5 INJECTION INTRAVENOUS at 14:16

## 2017-07-25 RX ADMIN — SODIUM CHLORIDE SCH MLS/HR: 900 INJECTION INTRAVENOUS at 01:36

## 2017-07-25 RX ADMIN — BUDESONIDE SCH MG: 0.5 SUSPENSION RESPIRATORY (INHALATION) at 20:09

## 2017-07-25 NOTE — DISCHARGE PLAN
Discharge Plan


Disposition: [facility] DC/Xfer


Condition: Good


Prescriptions: 


Calcium Carbonate [Tums (Calcium Carbonate 500mg)] 500 mg PO Q8HR PRN #30 tablet


 PRN Reason: Indigestion


Lidocaine Patch 5% [Lidoderm Patch] 2 patch TOP DAILY PRN #15 patch


 PRN Reason: Pain


Cholecalciferol [Vitamin D3] 5,000 unit PO BID #30 capsule


Latanoprost 0.005% Ophth Drops [Xalatan Ophth Drops] 1 drops EACHEYE QPM #1 

bottle


Linezolid [Zyvox] 600 mg PO BID #20 tablet


Diet: Cardiac


Activity Restrictions: Activity as Tolerated


Shower Restrictions: No


Driving Restrictions: No


Assistance Devices: Walker, Cane


Weight Bearing: Full Weight


Instruction Topics:  Catheter Indwelling Urinary Dc


Additional Instructions or Follow Up instructions: 


Please continue all home medications as prescribed. continue on antibiotic for 

pneumonia. You will need to see your primary care provider 1 week after 

discharge or sooner if the symptoms worsen. 





Continue on your regular diet cardiac as tolerated. You will need to get plenty 

of water daily and avoid soda and caffeineproducts. 





Walking is good exercise daily. Use your cane and or walker for assistance with 

ambulation





Return to the ER if symptoms should get worse or you have chest pain or 

shortness of breath. 


No Smoking: If you smoke, Please STOP!  Call [phone] for help.


Follow-up with: 


Jose Rodriguez DO [Primary Care Provider] -

## 2017-07-25 NOTE — XRAY PRELIMINARY REPORT
Accession: [id number]

Exam: XR Chest 1 View

 

IMPRESSION: 

1. Interval increase in perihilar reticular opacity. There are small to moderate-sized bilateral pleu
ral effusions. Findings may represent developing lung edema. 

2. There is no evidence of focal infiltrate or pneumothorax.

 

[facility]

 

SITE ID: [id number]

## 2017-07-25 NOTE — XRAY REPORT
EXAM: 

CHEST RADIOGRAPHY

 

EXAM DATE: 7/25/2017 04:43 PM.

 

CLINICAL HISTORY: Dyspnea.

 

COMPARISON: 07/23/2017.

 

TECHNIQUE: 1 view.

 

FINDINGS:

Lungs/Pleura: Costophrenic sulcus blunting may represent small effusions. There is perihilar reticula
r opacity which is slightly increased as compared to the previous study. No evidence of focal infiltr
ate. No pneumothorax.

 

Mediastinum: Within exam limitations, cardiomediastinal contour is normal.

 

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

 

IMPRESSION: 

1. Interval increase in perihilar reticular opacity. There are small to moderate-sized bilateral pleu
ral effusions. Findings may represent developing lung edema. 

2. There is no evidence of focal infiltrate or pneumothorax.

 

[facility]

Referring Provider Line: [phone]

 

SITE ID: [id number]

## 2017-07-26 LAB
ALBUMIN/GLOB SERPL: 0.9 {RATIO} (ref 1–2.2)
ANION GAP SERPL CALCULATED.4IONS-SCNC: 7 MMOL/L (ref 6–13)
BASOPHILS NFR BLD AUTO: 0 10^3/UL (ref 0–0.1)
BASOPHILS NFR BLD AUTO: 0.3 %
BILIRUB BLD-MCNC: 1 MG/DL (ref 0.2–1)
BUN SERPL-MCNC: 9 MG/DL (ref 6–20)
CALCIUM UR-MCNC: 8.3 MG/DL (ref 8.5–10.3)
CHLORIDE SERPL-SCNC: 100 MMOL/L (ref 101–111)
CO2 SERPL-SCNC: 30 MMOL/L (ref 21–32)
CREAT SERPLBLD-SCNC: 0.8 MG/DL (ref 0.6–1.2)
EOSINOPHIL # BLD AUTO: 0.5 10^3/UL (ref 0–0.7)
EOSINOPHIL NFR BLD AUTO: 5.6 %
ERYTHROCYTE [DISTWIDTH] IN BLOOD BY AUTOMATED COUNT: 14.3 % (ref 12–15)
GFRSERPLBLD MDRD-ARVRAT: 92 ML/MIN/{1.73_M2} (ref 89–?)
GLOBULIN SER-MCNC: 3.4 G/DL (ref 2.1–4.2)
GLUCOSE SERPL-MCNC: 140 MG/DL (ref 70–100)
HCT VFR BLD AUTO: 31.5 % (ref 42–52)
HGB UR QL STRIP: 10.7 G/DL (ref 14–18)
LYMPHOCYTES # SPEC AUTO: 1.2 10^3/UL (ref 1.5–3.5)
LYMPHOCYTES NFR BLD AUTO: 13.8 %
MCH RBC QN AUTO: 31.3 PG (ref 27–31)
MCHC RBC AUTO-ENTMCNC: 34.1 G/DL (ref 32–36)
MCV RBC AUTO: 91.9 FL (ref 80–94)
MONOCYTES # BLD AUTO: 0.7 10^3/UL (ref 0–1)
MONOCYTES NFR BLD AUTO: 8.2 %
NEUTROPHILS # BLD AUTO: 6.4 10^3/UL (ref 1.5–6.6)
NEUTROPHILS # SNV AUTO: 8.9 X10^3/UL (ref 4.8–10.8)
NEUTROPHILS NFR BLD AUTO: 72.1 %
NRBC # BLD AUTO: 0 /100WBC
PDW BLD AUTO: 7.7 FL (ref 7.4–11.4)
POTASSIUM SERPL-SCNC: 3.2 MMOL/L (ref 3.5–5)
PROT SPEC-MCNC: 6.3 G/DL (ref 6.7–8.2)
RBC MAR: 3.43 10^6/UL (ref 4.7–6.1)
SODIUM SERPLBLD-SCNC: 137 MMOL/L (ref 135–145)
WBC # BLD: 8.9 X10^3/UL

## 2017-07-26 RX ADMIN — ENOXAPARIN SODIUM SCH MG: 100 INJECTION SUBCUTANEOUS at 08:35

## 2017-07-26 RX ADMIN — GABAPENTIN SCH MG: 100 CAPSULE ORAL at 06:50

## 2017-07-26 RX ADMIN — MIRTAZAPINE SCH MG: 15 TABLET, FILM COATED ORAL at 21:17

## 2017-07-26 RX ADMIN — BUDESONIDE SCH MG: 0.5 SUSPENSION RESPIRATORY (INHALATION) at 19:35

## 2017-07-26 RX ADMIN — INSULIN ASPART SCH: 100 INJECTION, SOLUTION INTRAVENOUS; SUBCUTANEOUS at 08:34

## 2017-07-26 RX ADMIN — TAMSULOSIN HYDROCHLORIDE SCH MG: 0.4 CAPSULE ORAL at 21:13

## 2017-07-26 RX ADMIN — ACETAMINOPHEN PRN MG: 325 TABLET ORAL at 16:33

## 2017-07-26 RX ADMIN — INSULIN ASPART SCH UNIT: 100 INJECTION, SOLUTION INTRAVENOUS; SUBCUTANEOUS at 21:22

## 2017-07-26 RX ADMIN — SODIUM CHLORIDE, PRESERVATIVE FREE SCH ML: 5 INJECTION INTRAVENOUS at 21:23

## 2017-07-26 RX ADMIN — POTASSIUM CHLORIDE SCH MLS/HR: 7.46 INJECTION, SOLUTION INTRAVENOUS at 22:45

## 2017-07-26 RX ADMIN — METRONIDAZOLE SCH MLS/HR: 500 INJECTION, SOLUTION INTRAVENOUS at 16:34

## 2017-07-26 RX ADMIN — SODIUM CHLORIDE, PRESERVATIVE FREE SCH ML: 5 INJECTION INTRAVENOUS at 14:12

## 2017-07-26 RX ADMIN — IMIPENEM AND CILASTATIN SODIUM SCH MLS/HR: 500; 500 INJECTION, POWDER, FOR SOLUTION INTRAVENOUS at 18:32

## 2017-07-26 RX ADMIN — FORMOTEROL FUMARATE DIHYDRATE SCH MCG: 20 SOLUTION RESPIRATORY (INHALATION) at 07:30

## 2017-07-26 RX ADMIN — IMIPENEM AND CILASTATIN SODIUM SCH MLS/HR: 500; 500 INJECTION, POWDER, FOR SOLUTION INTRAVENOUS at 10:48

## 2017-07-26 RX ADMIN — GABAPENTIN SCH MG: 100 CAPSULE ORAL at 21:13

## 2017-07-26 RX ADMIN — METRONIDAZOLE SCH MLS/HR: 500 INJECTION, SOLUTION INTRAVENOUS at 01:47

## 2017-07-26 RX ADMIN — INSULIN ASPART SCH UNIT: 100 INJECTION, SOLUTION INTRAVENOUS; SUBCUTANEOUS at 11:58

## 2017-07-26 RX ADMIN — METRONIDAZOLE SCH MLS/HR: 500 INJECTION, SOLUTION INTRAVENOUS at 07:57

## 2017-07-26 RX ADMIN — Medication SCH: at 21:22

## 2017-07-26 RX ADMIN — LINEZOLID SCH MLS/HR: 600 INJECTION, SOLUTION INTRAVENOUS at 11:58

## 2017-07-26 RX ADMIN — LATANOPROST SCH DROPS: 50 SOLUTION OPHTHALMIC at 21:22

## 2017-07-26 RX ADMIN — POTASSIUM CHLORIDE SCH MLS/HR: 7.46 INJECTION, SOLUTION INTRAVENOUS at 23:45

## 2017-07-26 RX ADMIN — CLOPIDOGREL BISULFATE SCH MG: 75 TABLET ORAL at 08:35

## 2017-07-26 RX ADMIN — FAMOTIDINE SCH MG: 20 TABLET, FILM COATED ORAL at 08:35

## 2017-07-26 RX ADMIN — DOCUSATE SODIUM SCH MG: 100 CAPSULE, LIQUID FILLED ORAL at 08:35

## 2017-07-26 RX ADMIN — HYDROCODONE BITARTRATE AND ACETAMINOPHEN PRN TAB: 10; 325 TABLET ORAL at 21:13

## 2017-07-26 RX ADMIN — INSULIN ASPART SCH: 100 INJECTION, SOLUTION INTRAVENOUS; SUBCUTANEOUS at 17:27

## 2017-07-26 RX ADMIN — FORMOTEROL FUMARATE DIHYDRATE SCH MCG: 20 SOLUTION RESPIRATORY (INHALATION) at 19:35

## 2017-07-26 RX ADMIN — FUROSEMIDE SCH MG: 10 INJECTION, SOLUTION INTRAVENOUS at 08:35

## 2017-07-26 RX ADMIN — POTASSIUM CHLORIDE SCH MLS/HR: 7.46 INJECTION, SOLUTION INTRAVENOUS at 21:13

## 2017-07-26 RX ADMIN — SODIUM CHLORIDE, PRESERVATIVE FREE SCH ML: 5 INJECTION INTRAVENOUS at 06:50

## 2017-07-26 RX ADMIN — Medication SCH UNIT: at 08:35

## 2017-07-26 RX ADMIN — LINEZOLID SCH MLS/HR: 600 INJECTION, SOLUTION INTRAVENOUS at 00:25

## 2017-07-26 RX ADMIN — LINEZOLID SCH MG: 600 TABLET, FILM COATED ORAL at 21:17

## 2017-07-26 RX ADMIN — SODIUM CHLORIDE, PRESERVATIVE FREE PRN ML: 5 INJECTION INTRAVENOUS at 00:25

## 2017-07-26 RX ADMIN — POLYETHYLENE GLYCOL 3350 SCH GM: 17 POWDER, FOR SOLUTION ORAL at 08:35

## 2017-07-26 RX ADMIN — GABAPENTIN SCH MG: 100 CAPSULE ORAL at 14:12

## 2017-07-26 RX ADMIN — BUDESONIDE SCH MG: 0.5 SUSPENSION RESPIRATORY (INHALATION) at 07:30

## 2017-07-26 NOTE — PROVIDER PROGRESS NOTE
Assessment/Plan





- Problem List


(1) UTI (urinary tract infection)


Qualifiers: 


   Urinary tract infection type: acute cystitis   Hematuria presence: without 

hematuria   Qualified Code(s): N30.00 - Acute cystitis without hematuria   


Assessment/Plan: 


acute. micro culture positive for enterobacter cloacae. sensitive to imipenem. 

will give IV. continue to monitor CBC with daily lab draws. 








(2) Hypokalemia due to loss of potassium


Assessment/Plan: 


acute. will replace with potassium oral 40meq and repeat levels in the morning. 








(3) B12 deficiency anemia


Qualifiers: 


   Vitamin B12 deficiency anemia type: unspecified B12 deficiency   Qualified 

Code(s): D51.9 - Vitamin B12 deficiency anemia, unspecified   


Assessment/Plan: 


resolved. already replaced with B12 shot. 








(4) Acute urinary retention


Assessment/Plan: 


resolving. continue to give oral replacement and monitor output of urine in 

aldrich








(5) History of COPD


Assessment/Plan: 


chronic. continue with supplemental oxygen and with respiratory therapy 

support. Duonebs when needed. 








(6) Low blood magnesium level


Assessment/Plan: 


acute. will continue to monitor the magnesium levels with daily lab values. 

stable now at 1.8








- Current Meds


Current Meds: 





 Current Medications











Generic Name Dose Route Start Last Admin





  Trade Name Freq  PRN Reason Stop Dose Admin


 


Acetaminophen  650 mg 07/22/17 17:14 07/25/17 13:15





  Tylenol  PO   650 mg





  Q4HR PRN   Administration





  Pain 1 to 4   


 


Budesonide  0.5 mg 07/23/17 12:00 07/26/17 07:30





  Pulmicort  INH   0.5 mg





  RTBID DON   Administration


 


Cholecalciferol  5,000 unit 07/25/17 21:00 07/26/17 08:35





  Vitamin D3  PO   5,000 unit





  BID DON   Administration


 


Clopidogrel Bisulfate  75 mg 07/23/17 09:00 07/26/17 08:35





  Plavix  PO   75 mg





  DAILY DON   Administration


 


Docusate Sodium  100 mg 07/23/17 10:00 07/26/17 08:35





  Colace 100mg Capsule  PO   100 mg





  DAILY DON   Administration


 


Enoxaparin Sodium  40 mg 07/23/17 09:00 07/26/17 08:35





  Lovenox  SUBQ   40 mg





  DAILY DON   Administration


 


Famotidine  20 mg 07/23/17 09:00 07/26/17 08:35





  Pepcid  PO   20 mg





  DAILY DON   Administration


 


Formoterol Fumarate  20 mcg 07/23/17 12:00 07/26/17 07:30





  Perforomist  INH   20 mcg





  RTBID DON   Administration


 


Furosemide  40 mg 07/25/17 20:00 07/26/17 08:35





  Lasix Inj 40 Mg Vial  IVP   40 mg





  DAILY DON   Administration


 


Gabapentin  100 mg 07/22/17 22:00 07/26/17 06:50





  Neurontin  PO   100 mg





  TID DON   Administration


 


Metronidazole  100 mls @ 100 mls/hr 07/23/17 00:00 07/26/17 07:57





  Flagyl 500 Mg/100 Ml  IV   100 mls/hr





  Q8H DON   Administration


 


Linezolid  300 mls @ 300 mls/hr 07/25/17 12:00 07/26/17 00:25





  Zyvox 600 Mg/300 Ml  IV   300 mls/hr





  Q12H DON   Administration


 


Insulin Aspart  1 - 9 unit 07/22/17 21:00 07/26/17 08:34





  Novolog  SUBQ   Not Given





  0800,1200,1700,2100 [facility]   





  Protocol   


 


Latanoprost  1 drops 07/22/17 21:00 07/25/17 21:16





  Xalatan Ophth Drops  EACHEYE   1 drops





  QPM DON   Administration


 


Lidocaine  2 patch 07/24/17 09:00 07/24/17 12:54





  Lidoderm Patch  TOP   2 patch





  DAILY PRN   Administration





  PAIN   


 


Mirtazapine  15 mg 07/22/17 21:00 07/25/17 21:16





  Remeron  PO   15 mg





  QPM DON   Administration


 


Morphine Sulfate  2 mg 07/22/17 17:14 07/25/17 18:23





  Morphine  IVP   2 mg





  Q2HR PRN   Administration





  Pain 8 to 10   


 


Polyethylene Glycol  17 gm 07/23/17 09:00 07/26/17 08:35





  Miralax  PO   17 gm





  DAILY DON   Administration


 


Sodium Chloride  10 ml 07/22/17 17:14 07/26/17 00:25





  Normal Saline Flush 0.9%  IVP   10 ml





  PRN PRN   Administration





  AS NEEDED PER PROVIDER ORDERS   


 


Sodium Chloride  10 ml 07/22/17 22:00 07/26/17 06:50





  Normal Saline Flush 0.9%  IVP   10 ml





  Q8HR DON   Administration


 


Tamsulosin HCl  0.4 mg 07/22/17 21:00 07/25/17 21:15





  Flomax  PO   0.4 mg





  QPM DON   Administration














- Lab Result


Lab results reviewed: Yes


Fish Bone Diagrams: 


 07/26/17 09:03





 07/26/17 09:03


Other Lab Results: 








 Abnormal Lab Results











  07/24/17 07/24/17 07/24/17





  11:14 16:27 20:38


 


RBC      





    


 


Hgb      





    


 


Hct      





    


 


MCH      





    


 


Lymph #      





    


 


ABG pO2      





    


 


ABG HCO3      





    


 


ABG Total CO2      





    


 


ABG O2 Saturation      





    


 


ABG Base Excess      





    


 


Potassium      





    


 


Chloride      





    


 


BUN      





    


 


Glucose      





    


 


POC Whole Bld Glucose  111 mg/dL H mg/dL  106 mg/dL H mg/dL  110 mg/dL H mg/dL





   (70 - 100)   (70 - 100)   (70 - 100) 


 


Calcium      





    


 


Total Bilirubin      





    


 


ALT      





    


 


Total Protein      





    


 


Albumin      





    


 


Albumin/Globulin Ratio      





    














  07/25/17 07/25/17 07/25/17





  05:35 05:35 11:25


 


RBC  3.35 10^6/uL L 10^6/uL    





   (4.70-6.10)   


 


Hgb  10.6 g/dL L g/dL    





   (14.0-18.0)   


 


Hct  31.2 % L %    





   (42.0-52.0)   


 


MCH  31.5 pg H pg    





   (27.0-31.0)   


 


Lymph #  0.9 10^3/uL L 10^3/uL    





   (1.5-3.5)   


 


ABG pO2      





    


 


ABG HCO3      





    


 


ABG Total CO2      





    


 


ABG O2 Saturation      





    


 


ABG Base Excess      





    


 


Potassium      





    


 


Chloride      





    


 


BUN    5 mg/dL L mg/dL  





    (6-20)  


 


Glucose      





    


 


POC Whole Bld Glucose      152 mg/dL H mg/dL





     (70 - 100) 


 


Calcium    8.2 mg/dL L mg/dL  





    (8.5-10.3)  


 


Total Bilirubin    1.3 mg/dL H mg/dL  





    (0.2-1.0)  


 


ALT    < 10 IU/L L IU/L  





    (10-60)  


 


Total Protein    5.8 g/dL L g/dL  





    (6.7-8.2)  


 


Albumin    2.8 g/dL L g/dL  





    (3.2-5.5)  


 


Albumin/Globulin Ratio    0.9  L   





    (1.0-2.2)  














  07/25/17 07/25/17 07/25/17





  12:00 16:49 20:41


 


RBC      





    


 


Hgb      





    


 


Hct      





    


 


MCH      





    


 


Lymph #      





    


 


ABG pO2  51 mmHg L* mmHg    





   ()   


 


ABG HCO3  29.5 mmol/L H mmol/L    





   (22.0-26.0)   


 


ABG Total CO2  30.9 MMOL/L H MMOL/L    





   (21.0-29.0)   


 


ABG O2 Saturation  88 % L %    





   (94-98)   


 


ABG Base Excess  4.6 mmol/L H mmol/L    





   (-2.0-3.0)   


 


Potassium      





    


 


Chloride      





    


 


BUN      





    


 


Glucose      





    


 


POC Whole Bld Glucose    191 mg/dL H mg/dL  117 mg/dL H mg/dL





    (70 - 100)   (70 - 100) 


 


Calcium      





    


 


Total Bilirubin      





    


 


ALT      





    


 


Total Protein      





    


 


Albumin      





    


 


Albumin/Globulin Ratio      





    














  07/26/17 07/26/17





  09:03 09:03


 


RBC  3.43 10^6/uL L 10^6/uL  





   (4.70-6.10)  


 


Hgb  10.7 g/dL L g/dL  





   (14.0-18.0)  


 


Hct  31.5 % L %  





   (42.0-52.0)  


 


MCH  31.3 pg H pg  





   (27.0-31.0)  


 


Lymph #  1.2 10^3/uL L 10^3/uL  





   (1.5-3.5)  


 


ABG pO2    





   


 


ABG HCO3    





   


 


ABG Total CO2    





   


 


ABG O2 Saturation    





   


 


ABG Base Excess    





   


 


Potassium    3.2 mmol/L L mmol/L





    (3.5-5.0) 


 


Chloride    100 mmol/L L mmol/L





    (101-111) 


 


BUN    





   


 


Glucose    140 mg/dL H mg/dL





    () 


 


POC Whole Bld Glucose    





   


 


Calcium    8.3 mg/dL L mg/dL





    (8.5-10.3) 


 


Total Bilirubin    





   


 


ALT    





   


 


Total Protein    6.3 g/dL L g/dL





    (6.7-8.2) 


 


Albumin    2.9 g/dL L g/dL





    (3.2-5.5) 


 


Albumin/Globulin Ratio    0.9  L 





    (1.0-2.2) 














- EKG Results


EKG Interpreted Independently: No





- Additional Planning


Condition/Complexity: Improved


My Orders: 





My Active Orders





07/25/17 11:42


Discharge [RC] .ONCE 





07/25/17 12:00


Linezolid 600 mg/300 ml [Zyvox 600 mg/300 ml] 300 ml IV Q12H 





07/25/17 20:00


FUROSEMIDE INJ 40mg VIAL [LASIX INJ 40 mg VIAL]   40 mg IVP DAILY 





07/25/17 21:00


Cholecalciferol [Vitamin D3]   5,000 unit PO BID 





07/26/17 01:29


Ipratropium/Albuterol [Duoneb]   3 ml INH RTQ6H PRN 





07/26/17 09:00


Imipenem/Cilastatin [Primaxin] 1,000 mg   Sodium Chloride 0.9% [Normal Saline 

0.9%] 250 ml IV Q8HR 











Consult/Specialty: OT, PT


Plan Discussed with:: Patient, Case Management (patient will be going back to 

[facility] tomorrow. He is needing one more overnight. He will go home on 

antibiotics for UTI)


Time Spent: 31-60 minutes





Subjective





- Subjective


Patient Reports: Feeling Better, Resting Comfortably


Nursing Reports: No Complaints, Confused (baseline is confused but he is 

improved and more alert today)





Objective


Vital Signs: 





 Vital Signs - 24 hr











  07/25/17 07/25/17 07/25/17





  11:26 13:00 14:02


 


Temperature  37.7 C H 36.9 C


 


Heart Rate 84  


 


Heart Rate [  98 96





Brachial]   


 


Respiratory 16 22 18





Rate   


 


Blood Pressure  106/47 L 115/50 L





[Left Brachial   





artery]   


 


Blood Pressure   





[Right Brachial   





artery]   


 


O2 Saturation  92 95














  07/25/17 07/25/17 07/25/17





  15:11 15:43 20:00


 


Temperature  37.0 C 


 


Heart Rate 94  87


 


Heart Rate [  104 H 





Brachial]   


 


Respiratory 18 22 18





Rate   


 


Blood Pressure  106/50 L 





[Left Brachial   





artery]   


 


Blood Pressure   





[Right Brachial   





artery]   


 


O2 Saturation  92 














  07/25/17 07/26/17 07/26/17





  20:34 01:00 05:36


 


Temperature 37.1 C 37.6 C H 37.2 C


 


Heart Rate   


 


Heart Rate [ 89 87 86





Brachial]   


 


Respiratory 24 16 18





Rate   


 


Blood Pressure 132/58 H  





[Left Brachial   





artery]   


 


Blood Pressure  125/57 L 127/51 L





[Right Brachial   





artery]   


 


O2 Saturation 94 93 94














  07/26/17 07/26/17





  07:30 08:55


 


Temperature  37.4 C


 


Heart Rate 84 


 


Heart Rate [  85





Brachial]  


 


Respiratory 24 20





Rate  


 


Blood Pressure  





[Left Brachial  





artery]  


 


Blood Pressure  119/52 L





[Right Brachial  





artery]  


 


O2 Saturation  92








 Oxygen











O2 Source                      Nasal cannula














I&O (Last 24 Hrs): 





 Intake and Output Totals x24h











 07/24/17 07/25/17 07/26/17





 23:59 23:59 23:59


 


Intake Total 2961 1964 555


 


Output Total 3875 1005 350


 


Balance -914 959 205











General: Alert, Cooperative, No acute distress


HEENT: Atraumatic, PERRLA


Neck: No thyromegaly


Lymphatic: no adenopathy


Neuro: Alert, Other (oriented to his name and birthdate)


Cardiovascular: Regular rate, Normal S1, Normal S2


Respiratory: Chest non-tender, No respiratory distress, Other (diminished but 

improving.)


Abdomen: Normal bowel sounds, No tenderness, No hepatospenomegaly, No masses


Genitourinary: No Mass, Other (aldrich in place)


Extremities: No clubbing, No cyanosis, No edema, Normal pulses, No tenderness/

swelling


Skin: No rashes, No breakdown, No significant lesion





- Results


Results: 





 Laboratory Results











WBC  6.4 x10^3/uL (4.8-10.8)   07/25/17  05:35    


 


RBC  3.35 10^6/uL (4.70-6.10)  L  07/25/17  05:35    


 


Hgb  10.6 g/dL (14.0-18.0)  L  07/25/17  05:35    


 


Hct  31.2 % (42.0-52.0)  L  07/25/17  05:35    


 


MCV  93.0 fL (80.0-94.0)   07/25/17  05:35    


 


MCH  31.5 pg (27.0-31.0)  H  07/25/17  05:35    


 


MCHC  33.9 g/dL (32.0-36.0)   07/25/17  05:35    


 


RDW  13.9 % (12.0-15.0)   07/25/17  05:35    


 


Plt Count  201 10^3/uL (130-450)   07/25/17  05:35    


 


MPV  7.9 fL (7.4-11.4)   07/25/17  05:35    


 


Neut #  4.4 10^3/uL (1.5-6.6)   07/25/17  05:35    


 


Lymph #  0.9 10^3/uL (1.5-3.5)  L  07/25/17  05:35    


 


Mono #  0.7 10^3/uL (0.0-1.0)   07/25/17  05:35    


 


Eos #  0.4 10^3/uL (0.0-0.7)   07/25/17  05:35    


 


Baso #  0.0 10^3/uL (0.0-0.1)   07/25/17  05:35    


 


Absolute Nucleated RBC  0.00 x10^3/uL  07/25/17  05:35    


 


Nucleated RBCs  0.0 /100WBC  07/25/17  05:35    


 


Bld Gas Analysis Time  1210   07/25/17  12:00    


 


Sample Site  LEFT BRACHIAL   07/25/17  12:00    


 


ABG pH  7.43  (7.35-7.45)   07/25/17  12:00    


 


ABG pCO2  45 mmHg (34-45)   07/25/17  12:00    


 


ABG pO2  51 mmHg ()  L*  07/25/17  12:00    


 


ABG HCO3  29.5 mmol/L (22.0-26.0)  H  07/25/17  12:00    


 


ABG Total CO2  30.9 MMOL/L (21.0-29.0)  H  07/25/17  12:00    


 


ABG O2 Saturation  88 % (94-98)  L  07/25/17  12:00    


 


ABG Base Excess  4.6 mmol/L (-2.0-3.0)  H  07/25/17  12:00    


 


Eitan Test  NOT APPLICABLE   07/25/17  12:00    


 


Room Air  YES   07/25/17  12:00    


 


Sodium  139 mmol/L (135-145)   07/25/17  05:35    


 


Potassium  3.5 mmol/L (3.5-5.0)   07/25/17  05:35    


 


Chloride  103 mmol/L (101-111)   07/25/17  05:35    


 


Carbon Dioxide  29 mmol/L (21-32)   07/25/17  05:35    


 


Anion Gap  7.0  (6-13)   07/25/17  05:35    


 


BUN  5 mg/dL (6-20)  L  07/25/17  05:35    


 


Creatinine  0.6 mg/dL (0.6-1.2)   07/25/17  05:35    


 


Estimated GFR (MDRD)  129  (>89)   07/25/17  05:35    


 


Glucose  95 mg/dL ()   07/25/17  05:35    


 


POC Whole Bld Glucose  191 mg/dL (70 - 100)  H  07/25/17  16:49    


 


Glycated Hemoglobin  6.5 % (4.6-6.2)  H  07/22/17  13:30    


 


Estim Average Glucose  140  ()  H  07/22/17  13:30    


 


Lactic Acid  1.0 mmol/L (0.5-2.2)   07/23/17  16:02    


 


Calcium  8.2 mg/dL (8.5-10.3)  L  07/25/17  05:35    


 


Magnesium  1.8 mg/dL (1.7-2.8)   07/26/17  05:20    


 


Total Bilirubin  1.3 mg/dL (0.2-1.0)  H  07/25/17  05:35    


 


AST  18 IU/L (10-42)   07/25/17  05:35    


 


ALT  < 10 IU/L (10-60)  L  07/25/17  05:35    


 


Alkaline Phosphatase  67 IU/L ()   07/25/17  05:35    


 


Ammonia  25.2 umol/L (7-35)   07/24/17  Unknown


 


B-Natriuretic Peptide  40 pg/mL (5-100)   07/23/17  15:19    


 


Total Protein  5.8 g/dL (6.7-8.2)  L  07/25/17  05:35    


 


Albumin  2.8 g/dL (3.2-5.5)  L  07/25/17  05:35    


 


Globulin  3.0 g/dL (2.1-4.2)   07/25/17  05:35    


 


Albumin/Globulin Ratio  0.9  (1.0-2.2)  L  07/25/17  05:35    


 


Lipase  19 U/L (22-51)  L  07/22/17  13:30    


 


Vitamin B12  200 pg/mL (180-914)   07/24/17  16:11    


 


TSH  4.95 uIU/mL (0.34-5.60)   07/24/17  16:11    


 


Urine Color  YELLOW   07/22/17  14:00    


 


Urine Clarity  CLOUDY  (CLEAR)   07/22/17  14:00    


 


Urine pH  6.5 PH (5.0-7.5)   07/22/17  14:00    


 


Ur Specific Gravity  1.020  (1.002-1.030)   07/22/17  14:00    


 


Urine Protein  100 mg/dL (NEGATIVE)  H  07/22/17  14:00    


 


Urine Glucose (UA)  NEGATIVE mg/dL (NEGATIVE)   07/22/17  14:00    


 


Urine Ketones  NEGATIVE mg/dL (NEGATIVE)   07/22/17  14:00    


 


Urine Occult Blood  LARGE  (NEGATIVE)  H  07/22/17  14:00    


 


Urine Nitrite  POSITIVE  (NEGATIVE)  H  07/22/17  14:00    


 


Urine Bilirubin  NEGATIVE  (NEGATIVE)   07/22/17  14:00    


 


Urine Urobilinogen  0.2 (NORMAL) E.U./dL (NORMAL)   07/22/17  14:00    


 


Ur Leukocyte Esterase  MODERATE  (NEGATIVE)  H  07/22/17  14:00    


 


Ur Microscopic Review  NOT INDICATED   07/22/17  14:00    


 


Urine Culture Comments  INDICATED   07/22/17  14:00    














- Procedures


Procedures: 





Procedures





CATARAC PHACOEMULS/ASPIR (01/15/15)


INSERT LENS AT CATAR EXT (01/15/15)

## 2017-07-27 VITALS — SYSTOLIC BLOOD PRESSURE: 133 MMHG | DIASTOLIC BLOOD PRESSURE: 59 MMHG

## 2017-07-27 LAB
ALBUMIN/GLOB SERPL: 0.9 {RATIO} (ref 1–2.2)
ANION GAP SERPL CALCULATED.4IONS-SCNC: 6 MMOL/L (ref 6–13)
BASOPHILS NFR BLD AUTO: 0.1 10^3/UL (ref 0–0.1)
BASOPHILS NFR BLD AUTO: 0.8 %
BILIRUB BLD-MCNC: 0.7 MG/DL (ref 0.2–1)
BUN SERPL-MCNC: 7 MG/DL (ref 6–20)
CALCIUM UR-MCNC: 8 MG/DL (ref 8.5–10.3)
CHLORIDE SERPL-SCNC: 100 MMOL/L (ref 101–111)
CO2 SERPL-SCNC: 30 MMOL/L (ref 21–32)
CREAT SERPLBLD-SCNC: 0.7 MG/DL (ref 0.6–1.2)
EOSINOPHIL # BLD AUTO: 0.6 10^3/UL (ref 0–0.7)
EOSINOPHIL NFR BLD AUTO: 7.7 %
ERYTHROCYTE [DISTWIDTH] IN BLOOD BY AUTOMATED COUNT: 14.4 % (ref 12–15)
GFRSERPLBLD MDRD-ARVRAT: 108 ML/MIN/{1.73_M2} (ref 89–?)
GLOBULIN SER-MCNC: 3.2 G/DL (ref 2.1–4.2)
GLUCOSE SERPL-MCNC: 181 MG/DL (ref 70–100)
HCT VFR BLD AUTO: 30.8 % (ref 42–52)
HGB UR QL STRIP: 10.5 G/DL (ref 14–18)
LYMPHOCYTES # SPEC AUTO: 1.6 10^3/UL (ref 1.5–3.5)
LYMPHOCYTES NFR BLD AUTO: 22 %
MCH RBC QN AUTO: 31.4 PG (ref 27–31)
MCHC RBC AUTO-ENTMCNC: 33.9 G/DL (ref 32–36)
MCV RBC AUTO: 92.5 FL (ref 80–94)
MONOCYTES # BLD AUTO: 0.7 10^3/UL (ref 0–1)
MONOCYTES NFR BLD AUTO: 9.2 %
NEUTROPHILS # BLD AUTO: 4.5 10^3/UL (ref 1.5–6.6)
NEUTROPHILS # SNV AUTO: 7.4 X10^3/UL (ref 4.8–10.8)
NEUTROPHILS NFR BLD AUTO: 60.3 %
NRBC # BLD AUTO: 0 /100WBC
PDW BLD AUTO: 7.8 FL (ref 7.4–11.4)
POTASSIUM SERPL-SCNC: 3.5 MMOL/L (ref 3.5–5)
PROT SPEC-MCNC: 6 G/DL (ref 6.7–8.2)
RBC MAR: 3.34 10^6/UL (ref 4.7–6.1)
SODIUM SERPLBLD-SCNC: 136 MMOL/L (ref 135–145)
WBC # BLD: 7.4 X10^3/UL

## 2017-07-27 RX ADMIN — Medication SCH UNIT: at 11:50

## 2017-07-27 RX ADMIN — INSULIN ASPART SCH: 100 INJECTION, SOLUTION INTRAVENOUS; SUBCUTANEOUS at 09:15

## 2017-07-27 RX ADMIN — IMIPENEM AND CILASTATIN SODIUM SCH MLS/HR: 500; 500 INJECTION, POWDER, FOR SOLUTION INTRAVENOUS at 10:41

## 2017-07-27 RX ADMIN — DOCUSATE SODIUM SCH MG: 100 CAPSULE, LIQUID FILLED ORAL at 08:06

## 2017-07-27 RX ADMIN — MORPHINE SULFATE PRN MG: 2 INJECTION, SOLUTION INTRAMUSCULAR; INTRAVENOUS at 05:55

## 2017-07-27 RX ADMIN — FUROSEMIDE SCH MG: 10 INJECTION, SOLUTION INTRAVENOUS at 08:06

## 2017-07-27 RX ADMIN — INSULIN ASPART SCH UNIT: 100 INJECTION, SOLUTION INTRAVENOUS; SUBCUTANEOUS at 11:51

## 2017-07-27 RX ADMIN — SODIUM CHLORIDE, PRESERVATIVE FREE SCH ML: 5 INJECTION INTRAVENOUS at 14:01

## 2017-07-27 RX ADMIN — CLOPIDOGREL BISULFATE SCH MG: 75 TABLET ORAL at 08:06

## 2017-07-27 RX ADMIN — IMIPENEM AND CILASTATIN SODIUM SCH MLS/HR: 500; 500 INJECTION, POWDER, FOR SOLUTION INTRAVENOUS at 02:16

## 2017-07-27 RX ADMIN — BUDESONIDE SCH MG: 0.5 SUSPENSION RESPIRATORY (INHALATION) at 07:20

## 2017-07-27 RX ADMIN — LINEZOLID SCH MG: 600 TABLET, FILM COATED ORAL at 08:06

## 2017-07-27 RX ADMIN — LIDOCAINE PRN PATCH: 50 PATCH TOPICAL at 12:42

## 2017-07-27 RX ADMIN — METRONIDAZOLE SCH MLS/HR: 500 INJECTION, SOLUTION INTRAVENOUS at 00:42

## 2017-07-27 RX ADMIN — SODIUM CHLORIDE, PRESERVATIVE FREE SCH ML: 5 INJECTION INTRAVENOUS at 05:55

## 2017-07-27 RX ADMIN — ENOXAPARIN SODIUM SCH MG: 100 INJECTION SUBCUTANEOUS at 08:07

## 2017-07-27 RX ADMIN — FORMOTEROL FUMARATE DIHYDRATE SCH MCG: 20 SOLUTION RESPIRATORY (INHALATION) at 07:20

## 2017-07-27 RX ADMIN — HYDROCODONE BITARTRATE AND ACETAMINOPHEN PRN TAB: 10; 325 TABLET ORAL at 03:24

## 2017-07-27 RX ADMIN — GABAPENTIN SCH MG: 100 CAPSULE ORAL at 14:00

## 2017-07-27 RX ADMIN — FAMOTIDINE SCH MG: 20 TABLET, FILM COATED ORAL at 08:06

## 2017-07-27 RX ADMIN — POLYETHYLENE GLYCOL 3350 SCH GM: 17 POWDER, FOR SOLUTION ORAL at 10:41

## 2017-07-27 RX ADMIN — METRONIDAZOLE SCH MLS/HR: 500 INJECTION, SOLUTION INTRAVENOUS at 08:06

## 2017-07-27 RX ADMIN — GABAPENTIN SCH MG: 100 CAPSULE ORAL at 05:55

## 2017-07-27 NOTE — DISCHARGE SUMMARY
Discharge Summary


Admit Date: 07/22/17


Discharge Date: 07/27/17


Discharging Provider: ADELINA Pederson


Primary Care Provider: Cal Prasad MD


Code Status: Attempt Resuscitation


Condition at Discharge: Good


Discharge Disposition: 03 SNF DC/Xfer


Discharge Facility Name: [facility]





- DIAGNOSES


Admission Diagnoses: 





1. Septicemia with Urinary tract infection, unspecified bacteria


2. noninsulin diabetes melllitus, uncontrolled


3. Hypotension, unspecified


4. Diverticulitis, mild


5. Possible Pneumonia


Discharge Diagnoses with Status of Each Condition: 





1. Acute urinary tract infection secondary to sepsis with enterobacter clocae 

bacteria


2. Acute hypotension


3. 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


4. Acute hypokalemia


5. Acute low magnesium level in serum


6. Chronic indwelling aldrich catheter for urinary retention





- HPI


History of Present Illness: 





his is a 83-year-old  male with a Past Medical History of hypertension

, COPD, DM2, chronic vision loss, chronic back pain, chronic shoulder pain, 

questionable congenital heart failure, who present emergence department for 

evaluation of severe pain in penis. Patient initially report he has been on 

illness and felt not good for couple days. Upon further interview, patient 

specifically report he had severe pain in his penis, especially when he 

urinates. The patient is not a good historian, he does not provide much 

information about exact questions. Patient did report he did not have short of 

breathing, chest pain, headache, nausea, vomiting, abdominal pain. He report 

loose stool but no diarrhea. He report his shoulder pain before he came into 

emergence department, now he feel better "after they put something on." 


Review of patient's ECHO study on 01/2016, it reveals mild concentric LVH with 

normal systolic function, EF 65%, There is mild diastolic dysfunction but left 

atrium is normal in size. Aortic and mitrial valves are normal function. Normal 

right ventricular size and function. Pulmonary pressure could not be 

determined. 


CT of abdomen on today reveals colon diverticulosis and probable mild 

diverticulitis in the proximal mild sigmoid colon without drainable abscess,  

Normal appendix, negative for bowel obstruction, bilateral intrarenal stones 

without hydronephrosis, Cholelithiasis without acute cholecystitis, small left 

pleural effusion, infiltrate process or pneumonia in the left lower lobe may be 

similar appearance.


CXR is pending.


UA reveals positive urine protein, urine occult blood, nitrite, and leukocyte 

esterase. Patient has been inserted Aldrich catheter in the emergence department. 


Lab test reveals patient has elevated WBC to 15.3 and left shift. Glucose is 

215. The nearest vital sign are as the following: Temperature 36.1, heart rate 

110, blood pressure 109/51, RR 20, SO2 97% with 2 liter of O2.


Patient is admitted to treatment for UTI, diverticulitis and pneumonia 








- CONSULTS | PROCEDURES


Consultations: none


Procedures: 





aldrich placement





- HOSPITAL COURSE


Hospital Course: 





Patient is a 83-year-old  male with a Past Medical History of 

hypertension, COPD, DM2, chronic vision loss, chronic back pain, chronic 

shoulder pain, questionable congenital heart failure, who presented to the ER 

and admitted with severe pain in penis. Patient  was admitted with UTI and 

chronic indwelling aldrich, with a new one placed in the ER prior to admission. 

He was started on antibiotic treatment in the ER, Rocephin and then changed to 

Unasyn after admission. He was on Levaquin for UTI and unasyn for sinusitis. 

Once sensitivities were reported from culture, patient was changed to Imipenem. 

He is resistant to multiple antibiotics. He will need to be discahrge to 

[facility] on appropriate oral antibioitcs. He continued with indwelling aldrich as 

inpatient. He worked with pT and OT to assist with ambulation. Upon review of 

ECHO study on 01/2016, it reveals mild concentric LVH with normal systolic 

function, EF 65%, There is mild diastolic dysfunction but left atrium is normal 

in size. Aortic and mitrial valves are normal function. Normal right 

ventricular size and function. Pulmonary pressure could not be determined. He 

did have some mild pulmonary edema, probably from IVF hydartion and was given 

lasix 40mg IV and his oxygenation improved to 92% or better. He had a CT of 

abdomen on today revealing colon diverticulosis and probable mild 

diverticulitis in the proximal mild sigmoid colon without drainable abscess,  

He was being treated with antibiotics including Flagyl PO.  At admission, he 

was thought to have a small infiltrate in the lung. Repeat Chest xray showed no 

infiltrate or effusion but more edema in the lungs. UA had protein, urine 

occult blood, nitrite, and leukocyte esterase. His diabetes was treated with 

diabetic diet and blood glucose monitored daily ACHS and sliding scale insulin. 

He received breathing treatments with RT. Physical and occupational therapy 

worked with patient for ambulation. On day of discharge to [facility], he was more 

alert and able to be safely discharged. . 


He will be discharged to SNF on antibiotics and home medications. 





- ALLERGIES


Allergies/Adverse Reactions: 


 Allergies











Allergy/AdvReac Type Severity Reaction Status Date / Time


 


No Known Drug Allergies Allergy   Verified 07/22/17 13:07














- MEDICATIONS


Home Medications: 


 Ambulatory Orders











 Medication  Instructions  Recorded  Confirmed


 


Latanoprost 1 drops EACHEYE QPM 09/12/16 07/22/17


 


Acetaminophen 325 mg PO Q4H PRN 11/26/16 07/22/17


 


Fluticasone/Salmeterol [Advair 1 inh INH BID 11/26/16 07/22/17





100-50 Diskus]   


 


Calcium Carbonate [Tums (Calcium 500 mg PO Q8HR PRN #0 tablet 01/18/17 07/22/17





Carbonate 500mg)]   


 


Clopidogrel [Plavix] 75 mg PO DAILY 30 Days 01/18/17 07/22/17


 


Gabapentin [Neurontin] 100 mg PO TID 07/22/17 07/22/17


 


Ipratropium/Albuterol [Duoneb] 3 ml INH Q6H PRN 07/22/17 07/22/17


 


Lorazepam 0.25 mg PO TID PRN 07/22/17 07/22/17


 


Mirtazapine [Remeron] 15 mg PO QPM 07/22/17 07/22/17


 


Tamsulosin HCl [Flomax] 0.4 mg PO QPM 07/22/17 07/22/17


 


Latanoprost 0.005% Ophth Drops 1 drops EACHEYE QPM #1 bottle 07/25/17 





[Xalatan Ophth Drops]   


 


Lidocaine Patch 5% [Lidoderm Patch] 2 patch TOP DAILY PRN #15 patch 07/25/17 


 


Calcium Carbonate [Tums (Calcium 500 mg PO Q8HR PRN #30 tablet 07/27/17 





Carbonate 500mg)]   


 


Cholecalciferol [Vitamin D3] 5,000 unit PO BID #30 capsule 07/27/17 


 


Linezolid [Zyvox] 600 mg PO BID #20 tablet 07/27/17 














- PHYSICAL EXAM AT DISCHARGE


General Appearance: positive: No acute distress, Alert


Eyes Bilateral: positive: Normal inspection, PERRL


ENT: positive: ENT inspection nml, Pharynx nml, No signs of dehydration


Neck: positive: Nml inspection, Thyroid nml, No JVD


Respiratory: positive: Chest non-tender, No respiratory distress, Other (

diminished in bases)


Cardiovascular: positive: Regular rate & rhythm, No murmur, No gallop


Peripheral Pulses: positive: 2+


Abdomen: positive: Non-tender, No organomegaly, Nml bowel sounds, No distention


Back: positive: Nml inspection.  negative: CVA tenderness (R)


Skin: positive: No rash, Warm, Dry


Neurologic/Psychiatric: positive: Sensation nml, Mood/affect nml, Disoriented 

to person.  negative: Sensory loss, Facial droop


Physical Exam Other/Comments: 








Will have pt follow up with PCP for further care or return if pt worsens. Pt 

comfortable with plan.





Time spent with patient for planning and assessment was 45 minutes





- LABS


Result Diagrams: 


 07/27/17 09:03





 07/27/17 09:03





- DIAGNOSTIC IMAGING


Diagnostic Imaging Results Comments: 








 Ambulatory Orders











 Medication  Instructions  Recorded  Confirmed


 


Latanoprost 1 drops EACHEYE QPM 09/12/16 07/22/17


 


Acetaminophen 325 mg PO Q4H PRN 11/26/16 07/22/17


 


Fluticasone/Salmeterol [Advair 1 inh INH BID 11/26/16 07/22/17





100-50 Diskus]   


 


Calcium Carbonate [Tums (Calcium 500 mg PO Q8HR PRN #0 tablet 01/18/17 07/22/17





Carbonate 500mg)]   


 


Clopidogrel [Plavix] 75 mg PO DAILY 30 Days 01/18/17 07/22/17


 


Gabapentin [Neurontin] 100 mg PO TID 07/22/17 07/22/17


 


Ipratropium/Albuterol [Duoneb] 3 ml INH Q6H PRN 07/22/17 07/22/17


 


Lorazepam 0.25 mg PO TID PRN 07/22/17 07/22/17


 


Mirtazapine [Remeron] 15 mg PO QPM 07/22/17 07/22/17


 


Tamsulosin HCl [Flomax] 0.4 mg PO QPM 07/22/17 07/22/17


 


Latanoprost 0.005% Ophth Drops 1 drops EACHEYE QPM #1 bottle 07/25/17 





[Xalatan Ophth Drops]   


 


Lidocaine Patch 5% [Lidoderm Patch] 2 patch TOP DAILY PRN #15 patch 07/25/17 


 


Calcium Carbonate [Tums (Calcium 500 mg PO Q8HR PRN #30 tablet 07/27/17 





Carbonate 500mg)]   


 


Cholecalciferol [Vitamin D3] 5,000 unit PO BID #30 capsule 07/27/17 


 


Linezolid [Zyvox] 600 mg PO BID #20 tablet 07/27/17 














- FOLLOW UP


Follow Up: 








 Abnormal Lab Results











  07/24/17 07/24/17 07/24/17





  11:14 16:27 20:38


 


RBC      





    


 


Hgb      





    


 


Hct      





    


 


MCH      





    


 


Lymph #      





    


 


Potassium      





    


 


Chloride      





    


 


Glucose      





    


 


POC Whole Bld Glucose  111 mg/dL H mg/dL  106 mg/dL H mg/dL  110 mg/dL H mg/dL





   (70 - 100)   (70 - 100)   (70 - 100) 


 


Calcium      





    


 


Total Protein      





    


 


Albumin      





    


 


Albumin/Globulin Ratio      





    














  07/25/17 07/25/17 07/25/17





  11:25 16:49 20:41


 


RBC      





    


 


Hgb      





    


 


Hct      





    


 


MCH      





    


 


Lymph #      





    


 


Potassium      





    


 


Chloride      





    


 


Glucose      





    


 


POC Whole Bld Glucose  152 mg/dL H mg/dL  191 mg/dL H mg/dL  117 mg/dL H mg/dL





   (70 - 100)   (70 - 100)   (70 - 100) 


 


Calcium      





    


 


Total Protein      





    


 


Albumin      





    


 


Albumin/Globulin Ratio      





    














  07/26/17 07/26/17 07/26/17





  09:03 09:03 16:39


 


RBC  3.43 10^6/uL L 10^6/uL    





   (4.70-6.10)   


 


Hgb  10.7 g/dL L g/dL    





   (14.0-18.0)   


 


Hct  31.5 % L %    





   (42.0-52.0)   


 


MCH  31.3 pg H pg    





   (27.0-31.0)   


 


Lymph #  1.2 10^3/uL L 10^3/uL    





   (1.5-3.5)   


 


Potassium    3.2 mmol/L L mmol/L  





    (3.5-5.0)  


 


Chloride    100 mmol/L L mmol/L  





    (101-111)  


 


Glucose    140 mg/dL H mg/dL  





    ()  


 


POC Whole Bld Glucose      134 mg/dL H mg/dL





     (70 - 100) 


 


Calcium    8.3 mg/dL L mg/dL  





    (8.5-10.3)  


 


Total Protein    6.3 g/dL L g/dL  





    (6.7-8.2)  


 


Albumin    2.9 g/dL L g/dL  





    (3.2-5.5)  


 


Albumin/Globulin Ratio    0.9  L   





    (1.0-2.2)  














  07/26/17 07/27/17 07/27/17





  20:43 07:48 09:03


 


RBC      3.34 10^6/uL L 10^6/uL





     (4.70-6.10) 


 


Hgb      10.5 g/dL L g/dL





     (14.0-18.0) 


 


Hct      30.8 % L %





     (42.0-52.0) 


 


MCH      31.4 pg H pg





     (27.0-31.0) 


 


Lymph #      





    


 


Potassium      





    


 


Chloride      





    


 


Glucose      





    


 


POC Whole Bld Glucose  154 mg/dL H mg/dL  121 mg/dL H mg/dL  





   (70 - 100)   (70 - 100)  


 


Calcium      





    


 


Total Protein      





    


 


Albumin      





    


 


Albumin/Globulin Ratio      





    














  07/27/17 07/27/17





  09:03 11:21


 


RBC    





   


 


Hgb    





   


 


Hct    





   


 


MCH    





   


 


Lymph #    





   


 


Potassium    





   


 


Chloride  100 mmol/L L mmol/L  





   (101-111)  


 


Glucose  181 mg/dL H mg/dL  





   ()  


 


POC Whole Bld Glucose    141 mg/dL H mg/dL





    (70 - 100) 


 


Calcium  8.0 mg/dL L mg/dL  





   (8.5-10.3)  


 


Total Protein  6.0 g/dL L g/dL  





   (6.7-8.2)  


 


Albumin  2.8 g/dL L g/dL  





   (3.2-5.5)  


 


Albumin/Globulin Ratio  0.9  L   





   (1.0-2.2)

## 2017-08-01 ENCOUNTER — HOSPITAL ENCOUNTER (OUTPATIENT)
Dept: HOSPITAL 76 - PC | Age: 82
Discharge: HOME | End: 2017-08-01
Attending: NURSE PRACTITIONER
Payer: MEDICARE

## 2017-08-01 DIAGNOSIS — F41.9: ICD-10-CM

## 2017-08-01 DIAGNOSIS — F03.90: ICD-10-CM

## 2017-08-01 DIAGNOSIS — J18.9: ICD-10-CM

## 2017-08-01 DIAGNOSIS — R41.3: ICD-10-CM

## 2017-08-01 DIAGNOSIS — R06.09: ICD-10-CM

## 2017-08-01 DIAGNOSIS — F32.9: ICD-10-CM

## 2017-08-01 DIAGNOSIS — Z51.5: Primary | ICD-10-CM

## 2017-08-01 DIAGNOSIS — Z66: ICD-10-CM

## 2017-08-01 DIAGNOSIS — N39.0: ICD-10-CM

## 2017-08-01 DIAGNOSIS — J44.9: ICD-10-CM

## 2017-08-01 DIAGNOSIS — J81.1: ICD-10-CM

## 2017-08-01 DIAGNOSIS — G89.29: ICD-10-CM

## 2017-08-01 DIAGNOSIS — Z79.51: ICD-10-CM

## 2017-08-01 PROCEDURE — 99309 SBSQ NF CARE MODERATE MDM 30: CPT

## 2017-08-01 NOTE — PROVIDER PROGRESS NOTE
Palliative Care Follow Up





- Referral


Referring Provider: Dr. Jose Rodriguez


Time of Visit: 14:30


Referral setting: Skilled Nursing Facility ([facility])





- Information Sources


History obtained from: Patient, Other (Nursing staff)


Exam limitations: No limitations, Clinical condition (The patient has very poor 

short-term memory. His friends are providing oversight but he has multiple 

clinical saff who are involved in his care.)





- History of Present Illness Update


Brief HPI Update: 





This is a nati 83-year-old gentleman with severe end-stage COPD, recurrent 

pneumonias and recurrent urinary infections.  He was hospitalized last week 

with shortness of breath and pain in penis, especially with urination. He was 

treated for UTI and initially pneumonia with a series of antibiotics (Rocephin, 

Unasyn, Levaquin, imipenem, Flagyl) as cultures came back revealing multiple 

resistance. Repeat chest radiographs revealed edema in the lungs rather than 

infiltrate or effusion. He was discharged to [facility] on linezolid and cefuroxime

, and is currently completing the course of cefuroxime.





Nursing reports he is significantly improved post-hospitalization, eating, 

participating in the exercise classes, more energy and joking and talking with 

staff and appears to be back to baseline. He agrees that he feels well and see 

his quality of life as good.





When I asked him if he did or didnt want to go to the hospital in the future 

if needed, he couldnt see why he wouldnt want go.  He knows he was recently 

in the hospital, but his short term memory is very poor and he doesnt recall 

much about it. He is pleasant today, able to joke and smile with me, and was 

able to sit up on the edge of the bed from a lying position with me helping 

pull him up by his arms.  He does tire after sitting up, with no back support, 

after 10-15 minutes.  





He is drinking more cranberry juice and urine color has improved and he doesnt 

appear dehydrated. Previously he was drinking a lot of V8 juice. Nursing says 

he doesnt drink much water. His weight has fluctuated since February from 

142.l8 lbs to 160 and most recently (17) at 157 lbs. Patient is not a 

reliable historian but states his normal weight is around 160 lbs. His appetite 

is good.








Social History





- Living Situation


Living arrangement: Nursing home


Support System: 





Co-DPOAs lRjade Mandujano and Carl ("Casper") Terri live in the area. See 

"Palliative Care" for contact details.








Medications/Allergies





- Medications


Home Medications: 


 Ambulatory Orders











 Medication  Instructions  Recorded  Confirmed


 


Acetaminophen 325 mg PO Q4H PRN 16


 


Fluticasone/Salmeterol [Advair 1 inh INH BID 16





100-50 Diskus]   


 


Clopidogrel [Plavix] 75 mg PO DAILY 30 Days 17


 


Gabapentin [Neurontin] 100 mg PO TID 17


 


Ipratropium/Albuterol [Duoneb] 3 ml INH Q6H PRN 17


 


Lorazepam 0.25 mg PO TID PRN 17


 


Mirtazapine [Remeron] 15 mg PO QPM 17


 


Tamsulosin HCl [Flomax] 0.4 mg PO QPM 17


 


Latanoprost 0.005% Ophth Drops 1 drops EACHEYE QPM #1 bottle 17





[Xalatan Ophth Drops]   


 


Lidocaine Patch 5% [Lidoderm Patch] 2 patch TOP DAILY PRN #15 patch 17


 


Calcium Carbonate [Tums (Calcium 500 mg PO Q8HR PRN #30 tablet 17





Carbonate 500mg)]   


 


Cefuroxime Axetil [Ceftin] 500 mg PO BID #20 susp.recon 17


 


Cholecalciferol [Vitamin D3] 5,000 unit PO BID #30 capsule 17














- Allergies


Allergies/Adverse Reactions: 


 Allergies











Allergy/AdvReac Type Severity Reaction Status Date / Time


 


No Known Drug Allergies Allergy   Verified 17 13:07














Review of Systems





- Constitutional


Constitutional: denies: Fever, Chills, Poor appetite





- Eyes


Eyes: reports: Pain (Chronic shoulder pain)





- Cardiovascular


Cariovascular: denies: Chest pain





- Respiratory


Respiratory: reports: SOB with exertion (On O2).  denies: Cough, Sputum 

production





- Gastrointestinal


Gastrointestinal: denies: Abdominal pain





- Neurological


Neurological: reports: Memory problems





- Psychiatric


Psychiatric: reports: Depression, Anxiety (He's not sure what he is anxious 

about)





Physical Examination





- Vital Signs


Temperature: 98.8 C


Pulse Rate: 85


O2 Saturation: 95


Blood Pressure: 125/68





- Physical Exam


General Appearance: positive: No acute distress, Alert


Eyes Bilateral: positive: EOMI, No lid inflammation, Conjunctivae nml, No 

scleral icterus, Other (mild periorbital edema)


ENT: positive: No signs of dehydration


Neck: positive: Trachea midline


Cardiovascular: positive: Regular rate & rhythm


Abdomen: positive: Non-tender


Skin: positive: No symptoms


Extremities: positive: No pedal edema, Other (Holds shoulders stiffly)


Neurologic/Psychiatric: positive: Mood/affect nml, Weakness (in hand )





Palliative Care





- POLST


Patient has POLST: No


POLST Status: DNR, Limited Interventions


Pain: Pain unchanged


Drowsiness: None


Nausea: None


Dyspnea: Mild (1-3)


Anorexia: None


Insomnia: Sleeps well


Constipation: No


Feelings of wellbeing/Perceived Quality of Life: Comment (Not anxious about his 

health or the future. "The good lord takes care of me.")


Performance Status: 





Current level of functioning:  Improved since hospitalization; returning to 

baseline. He is eating and drinking cranberry juice, participating in activities

, talking and joking with staff and me. This is a noticeable improvement from 

my recent encounters. Short term memory is significantly impaired. He is still 

weak, able to sit up on the side of the bed for 10-15 minutes. Has weak hand 

.





Palliative Care Performance Status:  60%





- Palliative Care


Discussion: 





Who is present:  Patient and myself.  I also interviewed several staff members.





Surrogate decision maker:  Co-DPOAs: Hollis Mandujano [phone] and mobile 

[phone].  Carl ("Casper") Terri mobile [phone] or [phone].





Patient/Family understanding of the illness:  Patients understanding of his 

disease is limited due to his short-term memory loss and dementia. He mentioned 

his daughter who  at age 57, which was 10-15 years ago





Most important goals:  Previous goal had been to avoid hospitalization, but 

patient stated today that he would want to go back to the hospital if necessary.





Patient/family concerns:  DPOAs not reachable by phone today. I will follow up 

with them regarding clarification of goals of care.











Impression and Recommendations





- Palliative Care


Impression: 





This is a nati 80-year-old gentleman who has end-stage COPD and recent and 

repetitive urinary tract infections with multiple drug resistances. He has 

improved energy and mental status since recent hospitalization and antibiotic 

treatment and reports being satisfied with his current quality of life.





Recommendations/Counseling Done: 





1.  End-stage COPD: He continues at risk for recurrent pneumonia, recent 

radiographs indicate lung edema. Lung sounds diminished. Continue O2 and 

inhaled corticosteroids.


2.  Chronic pain: Stable, he is not complaining about shoulder pain today. Will 

continue voltaren gel with Tylenol as needed and gabapentin scheduled three 

times a day. May consider NSAID in the future if pain worsens. Kidney function 

good: GFR on 17 is 108, BUN and Cr normal. Liver function tests also 

normal.


3.  Depression: No current symptoms on  mirtazapine 15mg daily.


4.  Anxiety: Mild, reports satisfaction with life. Use lorazepam as needed.


5.  Advanced care planning: Previous goals had been to avoid hospitalization, 

today he says he would want hospitalization if needed. I will follow up with 

DPOA.





Time Spent: 





30 minutes with greater than 50% of this done in counseling, symptom management

, goals of care, and coordination of care.

## 2017-08-22 ENCOUNTER — HOSPITAL ENCOUNTER (OUTPATIENT)
Dept: HOSPITAL 76 - PC | Age: 82
Discharge: HOME | End: 2017-08-22
Attending: NURSE PRACTITIONER
Payer: MEDICARE

## 2017-08-22 DIAGNOSIS — Z87.01: ICD-10-CM

## 2017-08-22 DIAGNOSIS — R41.3: ICD-10-CM

## 2017-08-22 DIAGNOSIS — R06.00: ICD-10-CM

## 2017-08-22 DIAGNOSIS — F03.90: ICD-10-CM

## 2017-08-22 DIAGNOSIS — Z66: ICD-10-CM

## 2017-08-22 DIAGNOSIS — J44.9: ICD-10-CM

## 2017-08-22 DIAGNOSIS — M25.511: ICD-10-CM

## 2017-08-22 DIAGNOSIS — Z96.0: ICD-10-CM

## 2017-08-22 DIAGNOSIS — E11.9: ICD-10-CM

## 2017-08-22 DIAGNOSIS — Z51.5: Primary | ICD-10-CM

## 2017-08-22 DIAGNOSIS — F32.9: ICD-10-CM

## 2017-08-22 DIAGNOSIS — Z79.51: ICD-10-CM

## 2017-08-22 DIAGNOSIS — Z79.82: ICD-10-CM

## 2017-08-22 DIAGNOSIS — R53.1: ICD-10-CM

## 2017-08-22 DIAGNOSIS — Z87.440: ICD-10-CM

## 2017-08-22 DIAGNOSIS — M54.5: ICD-10-CM

## 2017-08-22 DIAGNOSIS — M25.512: ICD-10-CM

## 2017-08-22 DIAGNOSIS — Z99.81: ICD-10-CM

## 2017-08-22 PROCEDURE — 99310 SBSQ NF CARE HIGH MDM 45: CPT

## 2017-08-22 NOTE — PROVIDER PROGRESS NOTE
Palliative Care Follow Up





- Referral


Referring Provider: Jose Rodriguez MD


Time of Visit: 17 14:30


Referral setting: Skilled Nursing Facility





- Information Sources


Exam limitations: Clinical condition (The patient has very poor short-term 

memory, and he is an unreliable historian. His friends/DPOA provide oversight 

and he has multiple clinical staff who are involved in his care.)





- History of Present Illness Update


Brief HPI Update: 





This is a nati 83-year-old gentleman with severe end-stage COPD, recurrent 

pneumonias and recurrent urinary infections.  He was hospitalized last month 

and was treated for UTI and initially pneumonia with a series of antibiotics (

Rocephin, Unasyn, Levaquin, imipenem, Flagyl) as cultures came back revealing 

multiple resistance. Repeat chest radiographs revealed edema in the lungs 

rather than infiltrate or effusion. He was discharged to [facility] on antibiotics 

and has completed those regimens





Nursing reports he has been stable since his hospitalization, with a good 

appetite and drinking more often than previously. His friends/DPOAs, Casper and 

Hollis, visit him regularly and see that he is doing well and seems to be 

enjoying life. The patient himself states to me that he feels his quality of 

life is good, and when asked if he ever feels depressed or unhappy, replies 

Depressed is not part of my vocabulary.





Sliding scale Novolog was started during hospitalization, and he was very 

unhappy about the repetitive blood sugar checks.  We have DCd the insulin and 

decreased BS checks to every other day, with the hope and intent of 

discontinuing them altogether, if an acceptable BG range is noted.





He continues with generalized weakness, unable to independently raise himself 

from lying to sitting without aid, and is able to sit up for only a short 

period before wanting to lie down.





One thing he is particularly happy about is that his special cowboy hat, which 

was recently lost, was found today. He mentioned that several times. 


 


Although his short-term memory is deficient, he does remember people and 

emotional events. He remembers MALCOLM Viera, saying, She is always full 

of jeremiah. During my last visit he mentioned his daughter, who has . During 

this visit he said he would like to see his grandchildren. When I explored this 

further, he said he hadnt seen them in many years and does not want to pursue 

it. I spoke with Onee, the DPOA, and she has had similar experiences with him. 

She knows very little about his background except that perhaps he may be 

estranged from them. His advanced dementia effectively prevents any further 

ability to help him reconnect with his family. 








Social History





- Living Situation


Living arrangement: Nursing home


Support System: 





Co-DPOAs Hollis Mandujano and Carl ("Casper") Terri (a couple) live in the 

area. See "Palliative Care" for contact details.








Medications/Allergies





- Medications


Home Medications: 


 Ambulatory Orders











 Medication  Instructions  Recorded  Confirmed


 


Acetaminophen 325 mg PO Q4H PRN 16


 


Fluticasone/Salmeterol [Advair 1 inh INH BID 16





100-50 Diskus]   


 


Clopidogrel [Plavix] 75 mg PO DAILY 30 Days 17


 


Gabapentin [Neurontin] 100 mg PO TID 17


 


Ipratropium/Albuterol [Duoneb] 3 ml INH Q6H PRN 17


 


Lorazepam 0.25 mg PO TID PRN 17


 


Mirtazapine [Remeron] 15 mg PO QPM 17


 


Tamsulosin HCl [Flomax] 0.4 mg PO QPM 17


 


Latanoprost 0.005% Ophth Drops 1 drops EACHEYE QPM #1 bottle 17





[Xalatan Ophth Drops]   


 


Calcium Carbonate [Tums (Calcium 500 mg PO Q8HR PRN #30 tablet 17





Carbonate 500mg)]   


 


Cholecalciferol [Vitamin D3] 5,000 unit PO BID #30 capsule 17














- Allergies


Allergies/Adverse Reactions: 


 Allergies











Allergy/AdvReac Type Severity Reaction Status Date / Time


 


No Known Drug Allergies Allergy   Verified 17 13:07














Review of Systems





- Constitutional


Constitutional: denies: Fever, Chills, Malaise





- Cardiovascular


Cariovascular: denies: Chest pain





- Respiratory


Respiratory: denies: Cough, SOB at rest





- Gastrointestinal


Gastrointestinal: denies: Abdominal pain, Constipation, Diarrhea





- Genitourinary


Genitourinary: denies: Dysuria





- Musculoskeletal


Musculoskeletal: reports: Back pain (chronic), Joint pain (chronic bilateral 

shoulders, especially left shoulder)





- Neurological


Neurological: reports: General weakness





- Psychiatric


Psychiatric: denies: Depression, Anxiety, Suicidal





Physical Examination





- Vital Signs


Temperature: 98.9 C


Pulse Rate: 67


O2 Saturation: 95


Blood Pressure: 128/60





- Physical Exam


General Appearance: positive: No acute distress


Eyes Bilateral: positive: EOMI, No lid inflammation, Conjunctivae nml, No 

scleral icterus


Neck: positive: Trachea midline


Respiratory: positive: Chest non-tender, No respiratory distress.  negative: 

Wheezes, Rales, Rhonchi


Cardiovascular: positive: Systolic murmur (3/6)


Abdomen: positive: Non-tender, No distention, Abnml bowel sounds (hypoactive)


Skin: positive: No symptoms


Neurologic/Psychiatric: positive: Motor nml, Sensation nml, Mood/affect nml, 

Weakness (unable to raise himself up from lying to sitting position in bed 

without aid)





Palliative Care





- POLST


Patient has POLST: Yes


POLST Status: Limited Interventions


Pain: Pain unchanged (chronic lumbar pain and bilateral shoulder pain.  No 

longer complaining of pain in wrist.)


Drowsiness: None


Nausea: None


Anxiety: None


Dyspnea: Mild (1-3) (requires continuous oxygen)


Anorexia: None


Insomnia: Sleeps well


Performance Status: 





Previous level of function prior to this episode:  Improved since 

hospitalization, but generally slowly declining.  Risk factors: history of 

repetitive infections and hospitalizations; chronic indwelling catheter.





Current level of functioning:  Requires considerable help with ADLs, unable to 

sit up in bed without help; has bed alarm due to requiring help with all 

transfers. Short term memory is significantly impaired.





Palliative Care Performance Status:  60%








- Palliative Care


Discussion: 





Who is present:  Patient and myself.  I interviewed several staff members and 

telephoned KYLE Shirley, before and after visit.





Surrogate decision maker:  Co-DPOAs: Hollis Mandujano [phone] and mobile 

[phone].  Carl ("Casper") Terri mobile [phone] or [phone].





Patient/Family understanding of the illness:  Patients understanding of his 

disease is limited due to his short-term memory loss and dementia. Discussed 

with DPOA his recent hospitalization and his advanced care planning. She and 

her  visit him regularly. She had seen him several times during the July 

hospitalization, and noted that one day he was doing very poorly and appeared 

weak and very sick, but the next day he was sitting up, talking, and appearing 

to enjoy himself. 





We reviewed the current POLST (DNR with limited intervention, ie, allowing 

hospitalization), and agreed to continue with this for now. I provided 

anticipatory guidance about eventually moving to comfort care, declining 

further hospitalizations when burden outweighs benefits, and eventual 

transition to Hospice. KYLE feels he is currently enjoying life and has already 

recuperated from numerous hospitalizations in the past and will want to 

continue to be cared for in this way as long as the hospitalizations permit him 

to go back to his current level of functioning.





Most important goals:  Quality of life, controlling chronic pain. 








Impression and Recommendations





- Palliative Care


Impression: 





This is a charming 80-year-old gentleman who has end-stage COPD and a history 

of repetitive urinary tract infections with multiple drug resistances and 

multiple hospitalizations. He does not currently qualify for Hospice, but has 

significant risk of future infections, hospitalizations, and continuing 

decline. 





Recommendations/Counseling Done: 





1.   End-stage COPD: High risk for pneumonia due to h/o recurrent pneumonias 

and multi-drug-resistant organisms. Continue round-the-clock O2 via nasal 

cannula and inhaled corticosteroids.





2.   Diabetes: Stopped sliding scale Novolin, which was started in hospital. 

Continue checking blood sugar every other day for 7-14 days to see trends, 

consider starting an oral medication and checking A1C every 3 months.





3.   Chronic pain: Stable, he is not complaining about shoulder pain today. 

Restarted Voltaren gel TID on shoulders and lower back, plus use as needed on 

those joints, and wrists too. Do not exceed 32g/day. Continue Tylenol 325mg 

Q4hr as needed and gabapentin 100mg TID. 





4.    Depression: Stable, no symptoms. Continue mirtazapine 15mg daily.





5.   Advanced care planning: Discussed with DPPREETI the current POLST being DNR 

with limited intervention (ie, hospitalization), and will continue with this 

for the time being. Provided anticipatory guidance about eventually moving to 

comfort care and no further hospitalizations; DPPREETI feels the patient is not 

currently ready.





Time Spent: 





30 minutes with greater than 50% of this done in counseling, symptom management

, as well as goals of care and anticipatory guidance with DPOA.

## 2017-09-17 ENCOUNTER — HOSPITAL ENCOUNTER (OUTPATIENT)
Dept: HOSPITAL 76 - EMS | Age: 82
Discharge: ANOTHER HOSPITAL | End: 2017-09-17
Attending: SURGERY
Payer: MEDICARE

## 2017-09-17 ENCOUNTER — HOSPITAL ENCOUNTER (INPATIENT)
Dept: HOSPITAL 76 - ED | Age: 82
LOS: 4 days | Discharge: NURSING, REHAB OR LONG-TERM CARE | DRG: 698 | End: 2017-09-21
Attending: SPECIALIST | Admitting: INTERNAL MEDICINE
Payer: MEDICARE

## 2017-09-17 DIAGNOSIS — N39.0: ICD-10-CM

## 2017-09-17 DIAGNOSIS — N18.9: ICD-10-CM

## 2017-09-17 DIAGNOSIS — R09.02: ICD-10-CM

## 2017-09-17 DIAGNOSIS — E86.0: ICD-10-CM

## 2017-09-17 DIAGNOSIS — N17.9: ICD-10-CM

## 2017-09-17 DIAGNOSIS — E78.5: ICD-10-CM

## 2017-09-17 DIAGNOSIS — R39.9: ICD-10-CM

## 2017-09-17 DIAGNOSIS — R50.9: Primary | ICD-10-CM

## 2017-09-17 DIAGNOSIS — J44.9: ICD-10-CM

## 2017-09-17 DIAGNOSIS — T83.511A: Primary | ICD-10-CM

## 2017-09-17 DIAGNOSIS — F03.90: ICD-10-CM
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
## 2017-09-17 DIAGNOSIS — I50.9: ICD-10-CM

## 2017-09-17 DIAGNOSIS — Z86.73: ICD-10-CM

## 2017-09-17 DIAGNOSIS — I95.9: ICD-10-CM

## 2017-09-17 DIAGNOSIS — Z87.448: ICD-10-CM

## 2017-09-17 DIAGNOSIS — Z51.5: ICD-10-CM

## 2017-09-17 DIAGNOSIS — E11.40: ICD-10-CM

## 2017-09-17 DIAGNOSIS — I11.0: ICD-10-CM

## 2017-09-17 DIAGNOSIS — G93.41: ICD-10-CM

## 2017-09-17 DIAGNOSIS — E87.2: ICD-10-CM

## 2017-09-17 DIAGNOSIS — A41.9: ICD-10-CM

## 2017-09-17 DIAGNOSIS — E11.9: ICD-10-CM

## 2017-09-17 DIAGNOSIS — Z66: ICD-10-CM

## 2017-09-17 DIAGNOSIS — I13.10: ICD-10-CM

## 2017-09-17 DIAGNOSIS — E11.22: ICD-10-CM

## 2017-09-17 DIAGNOSIS — Z96.0: ICD-10-CM

## 2017-09-17 DIAGNOSIS — Z22.322: ICD-10-CM

## 2017-09-17 DIAGNOSIS — Z87.891: ICD-10-CM

## 2017-09-17 DIAGNOSIS — R33.8: ICD-10-CM

## 2017-09-17 DIAGNOSIS — Z99.3: ICD-10-CM

## 2017-09-17 DIAGNOSIS — R65.21: ICD-10-CM

## 2017-09-17 DIAGNOSIS — F32.9: ICD-10-CM

## 2017-09-17 DIAGNOSIS — Z87.01: ICD-10-CM

## 2017-09-17 DIAGNOSIS — R13.13: ICD-10-CM

## 2017-09-17 DIAGNOSIS — G89.29: ICD-10-CM

## 2017-09-17 DIAGNOSIS — N40.1: ICD-10-CM

## 2017-09-17 DIAGNOSIS — E11.65: ICD-10-CM

## 2017-09-17 DIAGNOSIS — Z79.02: ICD-10-CM

## 2017-09-17 DIAGNOSIS — M54.9: ICD-10-CM

## 2017-09-17 DIAGNOSIS — Z87.19: ICD-10-CM

## 2017-09-17 DIAGNOSIS — Z16.24: ICD-10-CM

## 2017-09-17 PROCEDURE — 87086 URINE CULTURE/COLONY COUNT: CPT

## 2017-09-17 PROCEDURE — 99233 SBSQ HOSP IP/OBS HIGH 50: CPT

## 2017-09-17 PROCEDURE — 85025 COMPLETE CBC W/AUTO DIFF WBC: CPT

## 2017-09-17 PROCEDURE — 87070 CULTURE OTHR SPECIMN AEROBIC: CPT

## 2017-09-17 PROCEDURE — 84484 ASSAY OF TROPONIN QUANT: CPT

## 2017-09-17 PROCEDURE — 87493 C DIFF AMPLIFIED PROBE: CPT

## 2017-09-17 PROCEDURE — 80053 COMPREHEN METABOLIC PANEL: CPT

## 2017-09-17 PROCEDURE — 84100 ASSAY OF PHOSPHORUS: CPT

## 2017-09-17 PROCEDURE — 87205 SMEAR GRAM STAIN: CPT

## 2017-09-17 PROCEDURE — 94640 AIRWAY INHALATION TREATMENT: CPT

## 2017-09-17 PROCEDURE — 80069 RENAL FUNCTION PANEL: CPT

## 2017-09-17 PROCEDURE — 96360 HYDRATION IV INFUSION INIT: CPT

## 2017-09-17 PROCEDURE — 87150 DNA/RNA AMPLIFIED PROBE: CPT

## 2017-09-17 PROCEDURE — 81001 URINALYSIS AUTO W/SCOPE: CPT

## 2017-09-17 PROCEDURE — 83605 ASSAY OF LACTIC ACID: CPT

## 2017-09-17 PROCEDURE — 87040 BLOOD CULTURE FOR BACTERIA: CPT

## 2017-09-17 PROCEDURE — 83880 ASSAY OF NATRIURETIC PEPTIDE: CPT

## 2017-09-17 PROCEDURE — 99284 EMERGENCY DEPT VISIT MOD MDM: CPT

## 2017-09-17 PROCEDURE — 83735 ASSAY OF MAGNESIUM: CPT

## 2017-09-17 PROCEDURE — 99285 EMERGENCY DEPT VISIT HI MDM: CPT

## 2017-09-17 PROCEDURE — 71010: CPT

## 2017-09-17 PROCEDURE — 93005 ELECTROCARDIOGRAM TRACING: CPT

## 2017-09-17 PROCEDURE — 83690 ASSAY OF LIPASE: CPT

## 2017-09-17 PROCEDURE — 81003 URINALYSIS AUTO W/O SCOPE: CPT

## 2017-09-17 PROCEDURE — 36415 COLL VENOUS BLD VENIPUNCTURE: CPT

## 2017-09-18 LAB
ALBUMIN/GLOB SERPL: 1 {RATIO} (ref 1–2.2)
ANION GAP SERPL CALCULATED.4IONS-SCNC: 11 MMOL/L (ref 6–13)
BASOPHILS NFR BLD AUTO: 0.2 %
BASOPHILS NFR BLD AUTO: 0.3 %
BILIRUB BLD-MCNC: 4.3 MG/DL (ref 0.2–1)
BUN SERPL-MCNC: 27 MG/DL (ref 6–20)
CALCIUM UR-MCNC: 8.5 MG/DL (ref 8.5–10.3)
CHLORIDE SERPL-SCNC: 96 MMOL/L (ref 101–111)
CO2 SERPL-SCNC: 24 MMOL/L (ref 21–32)
CREAT SERPLBLD-SCNC: 2 MG/DL (ref 0.6–1.2)
CUL URINE ADD CHARGE: (no result)
EOSINOPHIL NFR BLD AUTO: 0 %
EOSINOPHIL NFR BLD AUTO: 0.1 %
ERYTHROCYTE [DISTWIDTH] IN BLOOD BY AUTOMATED COUNT: 13 % (ref 12–15)
ERYTHROCYTE [DISTWIDTH] IN BLOOD BY AUTOMATED COUNT: 13.1 % (ref 12–15)
GFRSERPLBLD MDRD-ARVRAT: 32 ML/MIN/{1.73_M2} (ref 89–?)
GLOBULIN SER-MCNC: 3.1 G/DL (ref 2.1–4.2)
GLUCOSE SERPL-MCNC: 200 MG/DL (ref 70–100)
HCT VFR BLD AUTO: 33.7 % (ref 42–52)
HCT VFR BLD AUTO: 35.5 % (ref 42–52)
HGB UR QL STRIP: 11.1 G/DL (ref 14–18)
HGB UR QL STRIP: 11.8 G/DL (ref 14–18)
LIPASE SERPL-CCNC: 10 U/L (ref 22–51)
LYMPHOBLASTS # BLD: 3 %
LYMPHOBLASTS # BLD: 4 %
LYMPHOCYTES NFR BLD AUTO: 4.1 %
LYMPHOCYTES NFR BLD AUTO: 4.8 %
MCH RBC QN AUTO: 31.3 PG (ref 27–31)
MCH RBC QN AUTO: 31.4 PG (ref 27–31)
MCHC RBC AUTO-ENTMCNC: 33 G/DL (ref 32–36)
MCHC RBC AUTO-ENTMCNC: 33.2 G/DL (ref 32–36)
MCV RBC AUTO: 94.6 FL (ref 80–94)
MCV RBC AUTO: 94.8 FL (ref 80–94)
MONOCYTES NFR BLD AUTO: 3.9 %
MONOCYTES NFR BLD AUTO: 4.1 %
NEUTROPHILS # SNV AUTO: 28.2 X10^3/UL (ref 4.8–10.8)
NEUTROPHILS # SNV AUTO: 28.4 X10^3/UL (ref 4.8–10.8)
NEUTROPHILS NFR BLD AUTO: 91 %
NEUTROPHILS NFR BLD AUTO: 91.5 %
NEUTS BAND NFR BLD MANUAL: 67 %
NEUTS BAND NFR BLD MANUAL: 70 %
NEUTS BAND NFR BLD: 20 %
NEUTS BAND NFR BLD: 23 %
NP AUTO DIFFERENTIAL?: YES
NP AUTO DIFFERENTIAL?: YES
NP MAN DIFFERENTIAL?: NO
NP MAN DIFFERENTIAL?: NO
PDW BLD AUTO: 8.2 FL (ref 7.4–11.4)
PDW BLD AUTO: 8.4 FL (ref 7.4–11.4)
PH UR STRIP.AUTO: 5 PH (ref 5–7.5)
PLAT MORPH BLD: (no result)
PLAT MORPH BLD: (no result)
PLATELET BLD QL SMEAR: (no result)
PLATELET BLD QL SMEAR: (no result)
POTASSIUM SERPL-SCNC: 4.2 MMOL/L (ref 3.5–5)
PROT SPEC-MCNC: 6.2 G/DL (ref 6.7–8.2)
RBC MAR: 3.56 10^6/UL (ref 4.7–6.1)
RBC MAR: 3.76 10^6/UL (ref 4.7–6.1)
SODIUM SERPLBLD-SCNC: 131 MMOL/L (ref 135–145)
SP GR UR STRIP.AUTO: >=1.03 (ref 1–1.03)
TOTAL CELLS COUNTED BLD: 100
TOTAL CELLS COUNTED BLD: 100
UA CHARGE (STRIP ONLY): (no result)
UA W/ MICROSCOPIC CHARGE: YES
UR CULTURE IF IND: (no result)
UROBILINOGEN UR STRIP.AUTO-MCNC: NEGATIVE MG/DL
WBC # BLD: 28.2 X10^3/UL
WBC # BLD: 28.4 X10^3/UL

## 2017-09-18 PROCEDURE — 02HV33Z INSERTION OF INFUSION DEVICE INTO SUPERIOR VENA CAVA, PERCUTANEOUS APPROACH: ICD-10-PCS | Performed by: NURSE ANESTHETIST, CERTIFIED REGISTERED

## 2017-09-18 RX ADMIN — SODIUM CHLORIDE, PRESERVATIVE FREE SCH ML: 5 INJECTION INTRAVENOUS at 07:20

## 2017-09-18 RX ADMIN — INSULIN ASPART SCH UNIT: 100 INJECTION, SOLUTION INTRAVENOUS; SUBCUTANEOUS at 08:23

## 2017-09-18 RX ADMIN — BUDESONIDE SCH MG: 0.5 SUSPENSION RESPIRATORY (INHALATION) at 07:45

## 2017-09-18 RX ADMIN — IMIPENEM AND CILASTATIN SODIUM SCH MLS/HR: 500; 500 INJECTION, POWDER, FOR SOLUTION INTRAVENOUS at 10:10

## 2017-09-18 RX ADMIN — METRONIDAZOLE SCH MLS/HR: 500 INJECTION, SOLUTION INTRAVENOUS at 08:29

## 2017-09-18 RX ADMIN — FORMOTEROL FUMARATE DIHYDRATE SCH MCG: 20 SOLUTION RESPIRATORY (INHALATION) at 20:41

## 2017-09-18 RX ADMIN — METRONIDAZOLE SCH MLS/HR: 500 INJECTION, SOLUTION INTRAVENOUS at 19:37

## 2017-09-18 RX ADMIN — INSULIN ASPART SCH UNIT: 100 INJECTION, SOLUTION INTRAVENOUS; SUBCUTANEOUS at 21:14

## 2017-09-18 RX ADMIN — IPRATROPIUM BROMIDE AND ALBUTEROL SULFATE PRN ML: 2.5; .5 SOLUTION RESPIRATORY (INHALATION) at 20:41

## 2017-09-18 RX ADMIN — IMIPENEM AND CILASTATIN SODIUM SCH MLS/HR: 500; 500 INJECTION, POWDER, FOR SOLUTION INTRAVENOUS at 17:18

## 2017-09-18 RX ADMIN — ACETAMINOPHEN SCH MLS/HR: 10 INJECTION, SOLUTION INTRAVENOUS at 11:04

## 2017-09-18 RX ADMIN — ACETAMINOPHEN SCH: 10 INJECTION, SOLUTION INTRAVENOUS at 10:21

## 2017-09-18 RX ADMIN — ACETAMINOPHEN SCH MLS/HR: 10 INJECTION, SOLUTION INTRAVENOUS at 03:02

## 2017-09-18 RX ADMIN — HEPARIN SODIUM SCH UNIT: 5000 INJECTION, SOLUTION INTRAVENOUS; SUBCUTANEOUS at 21:07

## 2017-09-18 RX ADMIN — SODIUM CHLORIDE SCH MLS/HR: 9 INJECTION, SOLUTION INTRAVENOUS at 18:20

## 2017-09-18 RX ADMIN — METRONIDAZOLE SCH MLS/HR: 500 INJECTION, SOLUTION INTRAVENOUS at 14:00

## 2017-09-18 RX ADMIN — FORMOTEROL FUMARATE DIHYDRATE SCH MCG: 20 SOLUTION RESPIRATORY (INHALATION) at 07:45

## 2017-09-18 RX ADMIN — INSULIN ASPART SCH: 100 INJECTION, SOLUTION INTRAVENOUS; SUBCUTANEOUS at 17:19

## 2017-09-18 RX ADMIN — HEPARIN SODIUM SCH UNIT: 5000 INJECTION, SOLUTION INTRAVENOUS; SUBCUTANEOUS at 08:49

## 2017-09-18 RX ADMIN — BUDESONIDE SCH MG: 0.5 SUSPENSION RESPIRATORY (INHALATION) at 20:41

## 2017-09-18 RX ADMIN — INSULIN ASPART SCH UNIT: 100 INJECTION, SOLUTION INTRAVENOUS; SUBCUTANEOUS at 11:47

## 2017-09-18 RX ADMIN — SODIUM CHLORIDE SCH MLS/HR: 9 INJECTION, SOLUTION INTRAVENOUS at 03:03

## 2017-09-18 RX ADMIN — SODIUM CHLORIDE SCH MG: 9 INJECTION, SOLUTION INTRAVENOUS at 07:22

## 2017-09-18 RX ADMIN — METRONIDAZOLE SCH MLS/HR: 500 INJECTION, SOLUTION INTRAVENOUS at 02:22

## 2017-09-18 RX ADMIN — LATANOPROST SCH DROPS: 50 SOLUTION OPHTHALMIC at 21:09

## 2017-09-18 RX ADMIN — SODIUM CHLORIDE, PRESERVATIVE FREE SCH ML: 5 INJECTION INTRAVENOUS at 15:08

## 2017-09-18 RX ADMIN — SODIUM CHLORIDE SCH MLS/HR: 9 INJECTION, SOLUTION INTRAVENOUS at 01:20

## 2017-09-18 RX ADMIN — SODIUM CHLORIDE, PRESERVATIVE FREE SCH ML: 5 INJECTION INTRAVENOUS at 22:00

## 2017-09-18 RX ADMIN — ACETAMINOPHEN SCH MLS/HR: 10 INJECTION, SOLUTION INTRAVENOUS at 18:15

## 2017-09-18 RX ADMIN — FLUCONAZOLE SCH MLS/HR: 2 INJECTION, SOLUTION INTRAVENOUS at 09:35

## 2017-09-18 NOTE — XRAY REPORT
EXAM: 

CHEST RADIOGRAPHY

 

EXAM DATE: 9/18/2017 05:17 AM.

 

CLINICAL HISTORY: Right IJ central line placement

 

COMPARISON: Exam earlier today at 12:31 AM

 

TECHNIQUE: 1 view.

 

FINDINGS:

Lungs/Pleura: There is diffuse pulmonary interstitial prominence within the right lung. Small bibasil
ar opacities. Possible trace right effusion. No pneumothorax.

 

Mediastinum: Indistinct pulmonary vessels. Cardiac silhouette is within normal limits.

 

Other: Right IJ central venous catheter tip projects over the midportion of the SVC. Severe degenerat
martín change about the shoulder bilaterally. 

 

IMPRESSION: 

1. Interim development of right lung pulmonary edema. Small bibasilar opacities may be from atelectas
is or edema. 

2. Trace right effusion. 

3. Right IJ central venous catheter tip projects over the midportion of the SVC.

 

KYE

Referring Provider Line: [phone]

 

SITE ID: [id number]

## 2017-09-18 NOTE — ED PHYSICIAN DOCUMENTATION
PD HPI DYSPNEA





- Stated complaint


Stated Complaint: SOA





- Chief complaint


Chief Complaint: Resp





- History obtained from


History obtained from: Patient, EMS





- History of Present Illness


Timing - onset: How many days ago (2)


Timing - onset during: Rest


Timing - duration: Days (2)


Timing - details: Gradual onset, Still present


Inciting event(s): URI


Improved by: O2


Worsened by: Exertion, Coughing


Associated symptoms: Fever, Cough


Similar symptoms before: Diagnosis (pneumonia and sepsis)


Recently seen: Admitted (In July for urosepsis)





- Additional information


Additional information: 





83-year-old male was a resident of [facility] with chronic urinary 

retention with indwelling Gupta catheter has had an admission for urosepsis 

back in July of this year he has been encouraged since that time.  He has had 

increased shortness of breath tachycardia and altered level of consciousness.





Review of Systems


Unable to obtain: AMS, Confused





PD PAST MEDICAL HISTORY





- Past Medical History


Cardiovascular: Congestive heart failure, Hypertension


Respiratory: COPD


Neuro: None, Other


Endocrine/Autoimmune: Type 2 diabetes


GI: None


: None


HEENT: Chronic vision loss, Other


Psych: None


Musculoskeletal: Chronic back pain


Derm: None





- Past Surgical History


Past Surgical History: Yes


General: Colonoscopy


HEENT: Cataracts





- Present Medications


Home Medications: 


 Ambulatory Orders











 Medication  Instructions  Recorded  Confirmed


 


Acetaminophen 325 mg PO Q4H PRN 11/26/16 09/18/17


 


Fluticasone/Salmeterol [Advair 1 inh INH BID 11/26/16 09/18/17





100-50 Diskus]   


 


Clopidogrel [Plavix] 75 mg PO DAILY 30 Days 01/18/17 09/18/17


 


Gabapentin [Neurontin] 100 mg PO TID 07/22/17 09/18/17


 


Ipratropium/Albuterol [Duoneb] 3 ml INH Q6H PRN 07/22/17 09/18/17


 


Lorazepam 0.25 mg PO TID PRN 07/22/17 09/18/17


 


Mirtazapine [Remeron] 15 mg PO QPM 07/22/17 09/18/17


 


Tamsulosin HCl [Flomax] 0.4 mg PO QPM 07/22/17 09/18/17


 


Latanoprost 0.005% Ophth Drops 1 drops EACHEYE QPM #1 bottle 07/25/17 09/18/17





[Xalatan Ophth Drops]   


 


Calcium Carbonate [Tums (Calcium 500 mg PO Q8HR PRN #30 tablet 07/27/17 09/18/17





Carbonate 500mg)]   


 


Cholecalciferol [Vitamin D3] 5,000 unit PO BID #30 capsule 07/27/17 09/18/17














- Allergies


Allergies/Adverse Reactions: 


 Allergies











Allergy/AdvReac Type Severity Reaction Status Date / Time


 


No Known Drug Allergies Allergy   Verified 07/22/17 13:07














- Social History


Does the pt smoke?: No


Smoking Status: Former smoker


Does the pt drink ETOH?: No


Does the pt have substance abuse?: No





- Immunizations


Immunizations are current?: No


Immunizations: TDAP >10years/unknown





- POLST


Patient has POLST: Yes


POLST Status: DNR





PD ED PE NORMAL





- Vitals


Vital signs reviewed: Yes (tachcy hypotensive and hypoxic)





- General


General: Well developed/nourished, Other (The patient is minimally responsive 

and )





- HEENT


HEENT: Atraumatic





- Cardiac


Cardiac: RRR, No murmur





- Respiratory


Respiratory: Other (tachypneic with bibasilar rhonchi)





- Abdomen


Abdomen: Soft, Non tender





- Back


Back: No CVA TTP, No spinal TTP





- Derm


Derm: Normal color, No rash





- Extremities


Extremities: No deformity, No edema





- Neuro


Neuro: No motor deficit, No sensory deficit





Results





- Vitals


Vitals: 


 Vital Signs - 24 hr











  09/17/17 09/18/17 09/18/17





  23:27 00:00 00:15


 


Temperature 37.2 C  


 


Heart Rate 117 H 119 H 118 H


 


Respiratory 24 20 20





Rate   


 


Blood Pressure 88/39 L 74/40 L 77/38 L


 


O2 Saturation 90 L 95 95














  09/18/17





  00:28


 


Temperature 


 


Heart Rate 117 H


 


Respiratory 22





Rate 


 


Blood Pressure 89/43 L


 


O2 Saturation 99








 Oxygen











O2 Source []                   Room air


 


O2 Source []                   2L via NC


 


O2 Source                      Nasal cannula

















- Labs


Labs: 


 Laboratory Tests











  09/18/17 09/18/17 09/18/17





  00:00 00:00 00:00


 


WBC  28.4 H  


 


RBC  3.76 L  


 


Hgb  11.8 L  


 


Hct  35.5 L  


 


MCV  94.6 H  


 


MCH  31.4 H  


 


MCHC  33.2  


 


RDW  13.1  


 


Plt Count  225  


 


MPV  8.2  


 


Sodium   131 L 


 


Potassium   4.2 


 


Chloride   96 L 


 


Carbon Dioxide   24 


 


Anion Gap   11.0 


 


BUN   27 H 


 


Creatinine   2.0 H 


 


Estimated GFR (MDRD)   32 L 


 


Glucose   200 H 


 


Lactic Acid   


 


Calcium   8.5 


 


Total Bilirubin   4.3 H 


 


AST   21 


 


ALT   11 


 


Alkaline Phosphatase   74 


 


Troponin I    < 0.04


 


Total Protein   6.2 L 


 


Albumin   3.1 L 


 


Globulin   3.1 


 


Albumin/Globulin Ratio   1.0 


 


Lipase   10 L 














  09/18/17





  00:00


 


WBC 


 


RBC 


 


Hgb 


 


Hct 


 


MCV 


 


MCH 


 


MCHC 


 


RDW 


 


Plt Count 


 


MPV 


 


Sodium 


 


Potassium 


 


Chloride 


 


Carbon Dioxide 


 


Anion Gap 


 


BUN 


 


Creatinine 


 


Estimated GFR (MDRD) 


 


Glucose 


 


Lactic Acid  4.4 H*


 


Calcium 


 


Total Bilirubin 


 


AST 


 


ALT 


 


Alkaline Phosphatase 


 


Troponin I 


 


Total Protein 


 


Albumin 


 


Globulin 


 


Albumin/Globulin Ratio 


 


Lipase 














Procedures





- IVC sono (time)


  ** 2007


Bedside IVC sono: IVC measures (cm) (0.98), IVC collapsed c insp (cm) (complete)

, Dehydration





PD MEDICAL DECISION MAKING





- ED course


Complexity details: reviewed old records, reviewed results, re-evaluated patient

, considered differential, d/w patient


ED course: 





83-year-old male with a history of sepsis previously presents today with what 

appears to be sepsis he has low central venous pressure hypotension hypoxia 

tachypnea and tachycardia.  He has been given 750 mL's of saline prior to 

arrival to the emergency department he continues to have low central venous 

pressure and he is administered further fluids as workup is begun.





Departure





- Departure


Disposition: [facility] DC/Xfer


Clinical Impression: 


 Altered level of consciousness





Sepsis


Qualifiers:


 Sepsis type: sepsis due to unspecified organism Qualified Code(s): A41.9 - 

Sepsis, unspecified organism


Condition: Critical

## 2017-09-18 NOTE — HISTORY & PHYSICAL EXAMINATION
DATE OF ADMISSION: 09/18/2017

 

CHIEF COMPLAINT: Multiple complaints including shortness of breath and altered 
mental status. 

 

SOURCE OF HISTORY: The history was obtained from the ER physician and reviewing 
the electronic medical record. The patient was acutely ill with altered mental 
status and could not meaningfully interact.

 

PRIMARY CARE PHYSICIAN: Cal Prasad MD.

 

HISTORY OF PRESENT ILLNESS: The patient is an 83-year-old chronically ill white 
male with multiple past medical problems who was hospitalized at the end of 
July at [facility] with urosepsis. His urine culture grew multiple 
organisms including Enterobacter cloacae, Staphylococcus aureus, and in the 
past he also had enterococcus in his urine. Following 5 days hospitalization in 
July, he was discharged to an extended care facility. Overnight on 09/17 
through 09/18, he was brought in to [facility] ER by ambulance from a local 
nursing home. He appeared acutely ill. On admission, his vital signs were 
unstable, he was hypotensive with blood pressure in the 80s, which did not 
improve after IV hydration. The patient received initially 750 mL IV fluid and 
another 750 in the ER and altogether after 1.5 liters IV fluid, he still 
remained hypotensive. He also was tachycardic with heart rate in the 120s and 
his heart rate did not decrease during the ER stay. If anything, it actually 
increased. His temperature was 37.5, so it was mildly elevated. He was 
saturating 100% on 2 liters nasal cannula; however, when observed, he appeared 
gurgling with his breathing. The ER workup showed multiple severe abnormalities 
including white blood cell count of 28.4, lactic acid of 4.4, creatinine of 2. 
Of note, last time creatinine was 0.7. Urinalysis was positive, but not overly 
impressive. It did show some bacteria and yeast as well. Of note, the patient 
has chronic urinary retention and he does have an indwelling Gupta catheter. 
Chest x-ray showed pulmonary vascular congestion, but the radiologist did not 
describe discrete infiltrate. Blood glucose was 200. Notably, troponin was 
negative. 

 

The patient has a POLST form, which lists DO NOT RESUSCITATE CODE STATUS WITH 
LIMITED ADDITIONAL INTERVENTION. 

 

When I examined the patient in the ER, he could not meaningfully interact and 
he could not provide any history. He appeared acutely and severely ill.

 

PAST MEDICAL HISTORY

1. Diabetes.

2. Chronic urinary retention, has indwelling Gupta catheter.

3. History of pneumonia.

4. Urinary tract infection.

5. Chronic kidney disease; however, creatinine during last admission was normal.

6. Hypertension.

7. COPD.

8. Chronic back pain.

9. Diastolic dysfunction. Last echocardiogram in the electronic medical record 
showed normal systolic function.

10. Diverticulosis.

11. Cerebrovascular accident.

12. Dementia.

13. Dyslipidemia.

14. History of pneumothorax.

15. History of B12 deficiency.

 

OUTPATIENT MEDICATIONS

Included

1. Gabapentin.

2. Latanoprost eyedrops.

3. Advair.

4. Tums.

5. Remeron.

6. DuoNebs.

7. Flomax.

8. Lorazepam.

9. Plavix.

10. Vitamin D.

11. Tylenol.

 

ALLERGIES: NO KNOWN DRUG ALLERGIES.

 

SOCIAL HISTORY: The patient is an extended care facility resident, he could not 
provide social history.

 

FAMILY HISTORY: Unobtainable due to altered mental status. 

 

REVIEW OF SYMPTOMS: Unobtainable due to altered mental status.

 

PHYSICAL EXAMINATION

VITAL SIGNS: Please see listed above in history of present illness.

GENERAL: The patient is a well developed, acutely ill-appearing male who was in 
acute distress.

SKIN: With pallor, diaphoretic.

NEUROLOGIC: Appeared encephalopathic, with global encephalopathy. No focal 
deficit was arousable, could not meaningfully interact.

PSYCH: Mildly agitated, moaning, appearing uncomfortable, complaining of 
discomfort, could not localize pain.

CVS: S1, S2, regular, fast rhythm. I could not hear a murmur.

RESPIRATORY: Borderline airway protection, gurgling breath sounds. Decreased 
air entry about all lung fields. No wheezes. Few crackles posteriorly.

ABDOMEN: Distended, nontender. Bowel tones hypoactive. No rebound tenderness.

LYMPH: No lymphedema.

MUSCULOSKELETAL: Without obvious trauma.

 

ASSESSMENT/ACTIVE ISSUES/DIAGNOSES

1. Severe sepsis/septic shock. Did not improve on IV hydration. Will require 
ICU admission and vasopressors. Also notable that the patient at age 83 could 
not likely tolerate aggressive IV hydration. He already received about 2-3 
liters in the first couple of hours and it is unlikely that he would remain 
stable with his respiratory status as he already seems without much reserve. 
Notably, his CODE STATUS IS DO NOT RESUSCITATE AND DO NOT INTUBATE.

2. End organ failure including encephalopathy and acute renal failure.

3. Acute renal failure. Creatinine is more than doubled. This is in the setting 
of sepsis.

4. Altered mental status, encephalopathy secondary to hypotensive shock and 
sepsis.

5. Severe lactic acidosis.

6. Source of sepsis could be urinary tract infection. Could be bacteremia as 
well and pneumonia cannot be ruled out. Notably, the patient's chest x-ray did 
not show significant abnormality. However, on physical exam, he did have 
gurgling breath sounds. He did have crackles as well and appeared being a high 
risk to aspirate. In addition, it is notable that the patient is dehydrated and 
in that state, his lung might not show an infiltrate. After rehydration, an 
infiltrate is more likely to show up.

7. Hyperglycemia with blood glucose of 200 in the setting of severe illness 
with history of diabetes.

8. Worsening overall status and critical illness. During the ER stay, the 
patient did not improve. His heart rate increased. His blood pressure remained 
low around 80 and subsequently he developed worsening respiratory status. 
Initially he was saturating 100% on 2 liters nasal cannula and on room air. 
Subsequently, when arrived to the ICU, his oxygen saturation dropped to the mid 
90s, was 94% on 2 liters nasal cannula. That is a sign that the patient does 
not really tolerate further aggressive fluid hydration and he will need 
pressors.

9. No urine output with renal failure in the setting of sepsis.

10. Multiple chronic medical illnesses. Please see listed in past medical 
history.

 

PLAN AND ORDERS

1. Admitted to the ICU.

2. I contacted Anesthesia to come in, and the on-call anesthesiologist will put 
in a central line emergently.

3. Start Levophed. There is not much reserve for further aggressive IV 
hydration.

4. Maintenance rate IV fluids as tolerated.

5. We will be prepared to use CPAP if pulmonary edema develops. The patient is 
not a candidate for further aggressive management such as intubation.

6. Regarding antibiotic coverage, I will use imipenem to cover for healthcare-
acquired pneumonia if that would be the case. Also, imipenem would cover for 
aspiration. Further, the patient had history of urine infections and imipenem 
would cover for most multidrug resistant organisms. In addition, I added 
Diflucan because the urinalysis showed some yeast, and yeast bacteremia or 
yeast urinary infection is possible as well. To further cover for aspiration, I 
added Flagyl. Therefore, we will use 3 agents now, imipenem, Diflucan and 
Flagyl. Pan cultures were sent, including C difficile, blood cultures, 
respiratory cultures. We will check MRSA screen. If positive, then we will 
consider vancomycin as well.

7. Deep venous thrombosis prophylaxis with decreased dose of subcutaneous 
heparin, considering increased bleeding risk with advanced age and renal 
failure.

8. Continue inhalers for history of COPD.

9. For now, the patient is n.p.o. We will advance his diet if he will be awake 
enough to take oral intake. In that case, we will do bedside swallow screen and 
start with dysphagia diet. I doubt, however, that the patient's condition will 
improve that quickly. I expect him to be n.p.o. until his condition improves.

10. The patient is getting admitted as inpatient. I expect a somewhat prolonged 
hospital stay, at least 3-4 days. There is also risk that this patient would 
not improve. He has high mortality risk given his advanced age and severe 
sepsis with end organ failure.

 

Critical care time spent with the care of this patient was 70 minutes.

 

 

 

DD:09/18/2017 04:21:00  DT: 09/18/2017 06:05  JOB #: [id number]  EXT JOB #:[id number]

ARIES

## 2017-09-18 NOTE — XRAY PRELIMINARY REPORT
Accession: [id number]

Exam: XR Chest 1 View

 

IMPRESSION: Pulmonary venous congestion with no focal airspace disease or significant edema.

 

[facility]

 

SITE ID: [id number]

## 2017-09-18 NOTE — PROVIDER PROGRESS NOTE
Palliative Care Follow Up





- Referral


Referring Provider: Dr. Lor Chaney


Time of Visit: 7690-3478


Referral setting: Hospitalized patient


Referral Reason: Goals of Care





- Information Sources


Records Reviewed: RN notes reviewed, Old records reviewed


History obtained from: Other (notes and patient known to me)


Exam limitations: Clinical condition (patient at baseline able to recognize 

people; speak in full sentences but has severe STM deficits and does not have 

decision making capacity)





- History of Present Illness Update


Brief HPI Update: 


This is a nati 83-year-old gentleman well known to me from my palliative care 

outpatient practice, who has severe COPD, and was problematic, has been his 

recurrent pneumonias and recurrent urinary infections. He does have an 

indwelling catheter for BPH, and his cultures have continued to show increasing 

multiple drug resistance. His last hospitalization, was complicated in 

attempting to treat his pneumonia and UTI. He did have significant delirium, 

and multiple difficulties with fluid management and effusions. I am meeting 

with both Jemima and Hollis Mandujano, who have been his DPOAs, since he was no 

longer able to manage decision making on his own. He has no immediate family, 

and is currently residing at [facility], as a permanent resident. They actually 

visited yesterday, he was doing actually fairly well, and was actively engaged, 

and had a reasonable conversation. Jemima, reports, staff called as he had taken 

a fairly significant change for the worse and was wondering about 

hospitalization. The goals have been DNAR, but as long as able to return to 

past level of functioning, have chosen to continue with hospitalizations. 

Patient had told clinical staff he did not want to go, but is unable to 

realized the consequences of this decision, thus they made the decision to have 

him come in. Patient presents with acute sepsis, currently on pressors, but is 

responsive and recognizes myself and friends. He remains very fragile, and 

given his multiple hospitalizations, there is concern regarding his ability to 

recover this time.








Social History





- Living Situation


Living arrangement: Nursing home (patient is permenant resident of [facility] 

since  as unable to manage needs at home; there under medicaid)


Support System: 





His only daughter  at age 52 of brain cancer; was ; is estranged 

from 2 granddaughter. His friends Jemima and Hollis have over seen and supported 

him this last year, he knows them from working in the [employer] together. His a a 

Vet. Staff at [facility] are very fond of him, he does seem to have settled in. Is 

supported by Palliative Care [facility]NP and .





Medications/Allergies





- Medications


Active Medication List: 





Active Medications





Acetaminophen (Tylenol)  650 mg PO Q4HR PRN


   PRN Reason: Pain 1 to 4


Albuterol/Ipratropium (Duoneb)  3 ml INH Q6H PRN


   PRN Reason: Wheezing


Budesonide (Pulmicort)  0.5 mg INH RTBID DON


   Last Admin: 17 07:45 Dose:  0.5 mg


Formoterol Fumarate (Perforomist)  20 mcg INH RTBID DON


   Last Admin: 17 07:45 Dose:  20 mcg


Haloperidol (Haldol Inj)  0.5 mg IVP Q6H PRN


   PRN Reason: Agitation


Heparin Sodium (Porcine) ()  2,500 unit SUBQ BID [facility]


   Last Admin: 17 08:49 Dose:  2,500 unit


Sodium Chloride (Normal Saline 0.9%)  1,000 mls @ 100 mls/hr IV .Q10H [facility]


   Last Admin: 17 03:03 Dose:  100 mls/hr


Fluconazole (Diflucan 200 Mg/100 Ml)  50 mls @ 100 mls/hr IV DAILY [facility]


   Last Admin: 17 09:35 Dose:  100 mls/hr


Metronidazole (Flagyl 500 Mg/100 Ml)  100 mls @ 100 mls/hr IV Q6H [facility]


   Last Admin: 17 14:00 Dose:  100 mls/hr


Imipenem/Cilastatin Sodium 500 (mg/ Sodium Chloride)  100 mls @ 200 mls/hr IV 

Q8H [facility]


   Last Admin: 17 10:10 Dose:  200 mls/hr


Acetaminophen (Ofirmev)  100 mls @ 400 mls/hr IV Q8H DON


   Stop: 17 10:59


   Last Admin: 17 11:04 Dose:  400 mls/hr


Norepinephrine Bitartrate 8 mg (/ Dextrose)  250 mls @ 11.25 mls/hr IV .[id number] 

DON; 6 MCG/MIN


   PRN Reason: Protocol


   Last Admin: 17 15:59 Dose:  8 mls/hr


Insulin Aspart (Novolog)  1 - 5 unit SUBQ 0800,1200,1700,2100 DON


   PRN Reason: Protocol


   Last Admin: 17 11:47 Dose:  1 unit


Latanoprost (Xalatan Ophth Drops)  1 drops EACHEYE QPM DON


Pantoprazole Sodium (Protonix)  40 mg IVP QDAC [facility]


   Last Admin: 17 07:22 Dose:  40 mg


Sodium Chloride (Normal Saline Flush 0.9%)  10 ml IVP Q8HR [facility]


   Last Admin: 17 15:08 Dose:  10 ml


Sodium Chloride (Normal Saline Flush 0.9%)  20 ml IVP PRN PRN


   PRN Reason: After Blood Draw





 





Acetaminophen 325 mg PO Q4H PRN 16 


Fluticasone/Salmeterol [Advair 100-50 Diskus] 1 inh INH BID 16 


Gabapentin [Neurontin] 100 mg PO TID 17 


Ipratropium/Albuterol [Duoneb] 3 ml INH Q6H PRN 17 


Lorazepam 0.25 mg PO TID PRN 17 


Mirtazapine [Remeron] 15 mg PO QPM 17 


Tamsulosin HCl [Flomax] 0.4 mg PO QPM 17 











- Allergies


Allergies/Adverse Reactions: 


 Allergies











Allergy/AdvReac Type Severity Reaction Status Date / Time


 


No Known Drug Allergies Allergy   Verified 17 13:07














Review of Systems





- Other Findings


Other Findings: 


Patient unable to participate in review of systems, he denies pain or discomfort

, aware he is in the hospital, and recognizes myself and friends. He denies SOB 

though seems to be having some distress.








Physical Examination





- Vital Signs


Vital Signs: 





 Vital Signs x48h











  Temp Pulse Resp BP Pulse Ox


 


 17 16:00   102 H  20  97/52 L  94


 


 17 15:00   102 H  20  94/62  93


 


 17 14:00   105 H  21  99/48 L  96


 


 17 13:00   112 H  23  104/54 L  96


 


 17 12:00  36.6 C  104 H  21  97/50 L  96


 


 17 11:00   109 H  22  120/53 L  95


 


 17 10:00   108 H  22  110/91 H  97














- Physical Exam


General Appearance: positive: Mild distress (respiratoy)


Eyes Bilateral: positive: Other (has flakey eye lashes; slight reddend 

conjunctivae)


ENT: positive: Dry mucous membranes, Other (dry lips)


Neck: positive: Trachea midline


Respiratory: positive: Wheezes, Rhonchi, Other (diminished bases; presents with 

respiratory effort)


Cardiovascular: positive: Tachycardia


Abdomen: positive: No distention, Other (aldrich with dark yellow urine)


Skin: positive: Pallor, Dryness, Rash (tends to get rash with shaving; 

facililty trys to shave weekly to minimize)


Extremities: positive: No pedal edema


Neurologic/Psychiatric: positive: Disoriented to place, Disoriented to time, 

Other (voice week; aware is in hospital unclear what brought him here)





Palliative Care





- POLST


Patient has POLST: Yes


POLST Status: DNR, Limited Interventions


Pain: Location (has long standing chronic back pain; most acute pain has been 

bilateral shoulder pain; denies pain at time of visit)


Performance Status: 


Patient at baseline is wheelchair bound, he is able to self propel through 

facility, he can self feed. He is dependent on facility staff for ADLs. He does 

spend a majority of the time sleeping. He is up for meals, and occasionally for 

other activities.








- Palliative Care


Discussion: 


Surrogate decision maker-Yadira Dennis [phone].Met with Yadira regarding the seriousness of patient's current condition. He has been 

seriously and acutely ill before, but each time it has been a longer time of 

recovery, and more complications. They are where some of the difficulty is 

currently with his hypotension as a result of his sepsis, and that's it is 

always difficult to find a balance for him with his heart, lungs, kidneys and 

finding of right drug for his infection. We did discuss hoping for the best, 

but being prepared for the worst. Most likely will have an idea of which 

direction this is going in the next 24-48 hours. They are aware of this area 

the situation. In the context of what might be best for Yoshi, we did discuss 

if he was not going to respond to treatment and/or experienced complications 

that would impact his quality of life further, we may be transitioning to 

comfort measures. And in preparing for the worst, discussion was had, where to 

support Yoshi for end-of-life. If possible, to return back to [facility], to staff 

to he is familiar with and fond of with hospice support.








Results





- Lab Results


Lab results reviewed: Yes


Fish Bones: 


 17 06:56





 17 00:00


Lab and Imaging Results: 





 Lab Results x24hrs











  17 Range/Units





  08:00 06:56 


 


WBC   28.2 H  (4.8-10.8)  x10^3/uL


 


RBC   3.56 L  (4.70-6.10)  10^6/uL


 


Hgb   11.1 L  (14.0-18.0)  g/dL


 


Hct   33.7 L  (42.0-52.0)  %


 


MCV   94.8 H  (80.0-94.0)  fL


 


MCH   31.3 H  (27.0-31.0)  pg


 


MCHC   33.0  (32.0-36.0)  g/dL


 


RDW   13.0  (12.0-15.0)  %


 


Plt Count   216  (130-450)  10^3/uL


 


MPV   8.4  (7.4-11.4)  fL


 


Neut #   Not Reportable  


 


Lymph #   Not Reportable  


 


Mono #   Not Reportable  


 


Eos #   Not Reportable  


 


Baso #   Not Reportable  


 


Absolute Nucleated RBC   Not Reportable  


 


Total Counted   100  


 


Band Neuts % (Manual)   23 H (0 - 10) %


 


Metamyelocytes %   3 H ( - 0) %


 


Neutrophils # (Manual)   25.4 H  (1.5-6.6)  10^3/uL


 


Lymphocytes # (Manual)   0.8 L  (1.5-3.5)  10^3/uL


 


Monocytes # (Manual)   1.1 H  (0.0-1.0)  10^3/uL


 


Nucleated RBCs   Not Reportable  


 


Differential Comment   MANUAL DIFFERENTIAL  


 


Platelet Estimate   NORMAL (130-450,000)  (NORMAL)  


 


Platelet Morphology   1+ GIANT PLATELETS  (NORMAL)  


 


RBC Morph Micro Appear   NORMAL APPEARANCE  (NORMAL)  


 


Lactic Acid  3.9 H*   (0.5-2.2)  mmol/L














Impression and Recommendations





- Palliative Care


Impression: 


This is a nati 83-year-old gentleman who has end-stage COPD now acutely 

admitted with sepsis and urinary tract infection. He has had multiple 

hospitalizations, repetitive urinary tract infections, with multiple drug 

resistances. He has had slow but steady decline over the last several months. 

He has an indwelling catheter, it has made it a challenge, to avoid infections. 

He presents today he is very frail, recognizing he is in precaurious position, 

the long-term goals were very have always been to focus on quality of life, 

treat reversible conditions,  and not to prolong suffering.





Recommendations/Counseling Done: 


1. Sepsis, currently attributed to his UTI. Patient with history of multiple 

recurrent urinary tract infections, indwelling catheter, the multidrug 

resistant organisms. Recognizing patient with his multiple comorbidities, 

advanced age, and palliative care performance status is 30% is at high risk for 

further sequela and deterioration of his current status.





2. Dementia without behavioral disturbances. Patient is pleasant at baseline, 

has severe short-term memory issues, can speak in full sentences but does not 

have decision-making capacity. He can be included in conversation, and is aware 

at times of the seriousness of his condition, but his friends oversee his care 

and make his decisions.





3. Advanced care planning. Family meeting with Michael and Hollis, reviewed the 

seriousness of his condition, and concerns about possible further decline. 

There biggest goal for Yoshi, is for him to be comfortable, not to extend 

suffering, and to treat it allows him to go back to his previous level of 

functioning. Patient did perceives his quality of life is acceptable prior to 

hospitalization. Will continue to follow patient, assist with shared decision 

making if patient needs to transition to comfort care. Palliative care  

has been notified and will visit patient tomorrow, patient has a strong 

spirituality and enjoys Benito's visits.





Time Spent: 


Time spent 60 minutes with greater than 50% of this done in counseling 

regarding goals of care, coordination of care with hospitalists and clinical 

staff.

## 2017-09-18 NOTE — XRAY PRELIMINARY REPORT
Accession: [id number]

Exam: XR Chest for Line Placement

 

IMPRESSION: 

1. Interim development of right lung pulmonary edema. Small bibasilar opacities may be from atelectas
is or edema. 

2. Trace right effusion. 

3. Right adjacent to venous catheter tip projects over the midportion of SVC.

 

[facility]

 

SITE ID: [id number]

## 2017-09-18 NOTE — XRAY REPORT
EXAM: 

CHEST RADIOGRAPHY

 

EXAM DATE: 9/18/2017 12:27 AM.

 

CLINICAL HISTORY: Shortness of breath

 

COMPARISON: None.

 

TECHNIQUE: 1 view.

 

FINDINGS:

Lungs/Pleura: Pulmonary vascular congestion without overt edema. No lobar consolidation. No pleural e
ffusion or pneumothorax.

 

Mediastinum: Within exam limitations, cardiomediastinal contour is normal.

 

Other: None.

 

IMPRESSION: Pulmonary venous congestion with no focal airspace disease or significant edema.

 

[facility]

Referring Provider Line: [phone]

 

SITE ID: [id number]

## 2017-09-19 LAB
ANION GAP SERPL CALCULATED.4IONS-SCNC: 4 MMOL/L (ref 6–13)
BUN SERPL-MCNC: 19 MG/DL (ref 6–20)
CALCIUM UR-MCNC: 8.2 MG/DL (ref 8.5–10.3)
CHLORIDE SERPL-SCNC: 108 MMOL/L (ref 101–111)
CO2 SERPL-SCNC: 27 MMOL/L (ref 21–32)
CREAT SERPLBLD-SCNC: 0.8 MG/DL (ref 0.6–1.2)
GFRSERPLBLD MDRD-ARVRAT: 92 ML/MIN/{1.73_M2} (ref 89–?)
GLUCOSE SERPL-MCNC: 135 MG/DL (ref 70–100)
PHOSPHATE BLD-MCNC: 1.9 MG/DL (ref 2.5–4.6)
POTASSIUM SERPL-SCNC: 3.4 MMOL/L (ref 3.5–5)
SODIUM SERPLBLD-SCNC: 139 MMOL/L (ref 135–145)

## 2017-09-19 RX ADMIN — IMIPENEM AND CILASTATIN SODIUM SCH MLS/HR: 500; 500 INJECTION, POWDER, FOR SOLUTION INTRAVENOUS at 01:34

## 2017-09-19 RX ADMIN — ACETAMINOPHEN SCH MLS/HR: 10 INJECTION, SOLUTION INTRAVENOUS at 10:48

## 2017-09-19 RX ADMIN — SODIUM CHLORIDE, PRESERVATIVE FREE SCH ML: 5 INJECTION INTRAVENOUS at 05:18

## 2017-09-19 RX ADMIN — METRONIDAZOLE SCH MLS/HR: 500 INJECTION, SOLUTION INTRAVENOUS at 03:29

## 2017-09-19 RX ADMIN — INSULIN ASPART SCH: 100 INJECTION, SOLUTION INTRAVENOUS; SUBCUTANEOUS at 21:00

## 2017-09-19 RX ADMIN — BUDESONIDE SCH MG: 0.5 SUSPENSION RESPIRATORY (INHALATION) at 19:45

## 2017-09-19 RX ADMIN — SODIUM CHLORIDE, PRESERVATIVE FREE SCH ML: 5 INJECTION INTRAVENOUS at 14:10

## 2017-09-19 RX ADMIN — SODIUM CHLORIDE SCH MLS/HR: 9 INJECTION, SOLUTION INTRAVENOUS at 14:56

## 2017-09-19 RX ADMIN — ACETAMINOPHEN SCH MLS/HR: 10 INJECTION, SOLUTION INTRAVENOUS at 18:32

## 2017-09-19 RX ADMIN — INSULIN ASPART SCH: 100 INJECTION, SOLUTION INTRAVENOUS; SUBCUTANEOUS at 19:15

## 2017-09-19 RX ADMIN — BUDESONIDE SCH MG: 0.5 SUSPENSION RESPIRATORY (INHALATION) at 07:43

## 2017-09-19 RX ADMIN — FORMOTEROL FUMARATE DIHYDRATE SCH MCG: 20 SOLUTION RESPIRATORY (INHALATION) at 07:42

## 2017-09-19 RX ADMIN — IMIPENEM AND CILASTATIN SODIUM SCH MLS/HR: 500; 500 INJECTION, POWDER, FOR SOLUTION INTRAVENOUS at 17:26

## 2017-09-19 RX ADMIN — SODIUM CHLORIDE, PRESERVATIVE FREE PRN ML: 5 INJECTION INTRAVENOUS at 18:35

## 2017-09-19 RX ADMIN — SODIUM CHLORIDE SCH MLS/HR: 9 INJECTION, SOLUTION INTRAVENOUS at 01:34

## 2017-09-19 RX ADMIN — HEPARIN SODIUM SCH UNIT: 5000 INJECTION, SOLUTION INTRAVENOUS; SUBCUTANEOUS at 09:44

## 2017-09-19 RX ADMIN — SODIUM CHLORIDE, PRESERVATIVE FREE SCH ML: 5 INJECTION INTRAVENOUS at 22:39

## 2017-09-19 RX ADMIN — INSULIN ASPART SCH: 100 INJECTION, SOLUTION INTRAVENOUS; SUBCUTANEOUS at 08:42

## 2017-09-19 RX ADMIN — IPRATROPIUM BROMIDE AND ALBUTEROL SULFATE PRN ML: 2.5; .5 SOLUTION RESPIRATORY (INHALATION) at 07:42

## 2017-09-19 RX ADMIN — METRONIDAZOLE SCH MLS/HR: 500 INJECTION, SOLUTION INTRAVENOUS at 08:49

## 2017-09-19 RX ADMIN — SODIUM CHLORIDE SCH MG: 9 INJECTION, SOLUTION INTRAVENOUS at 06:35

## 2017-09-19 RX ADMIN — FORMOTEROL FUMARATE DIHYDRATE SCH MCG: 20 SOLUTION RESPIRATORY (INHALATION) at 19:45

## 2017-09-19 RX ADMIN — IMIPENEM AND CILASTATIN SODIUM SCH MLS/HR: 500; 500 INJECTION, POWDER, FOR SOLUTION INTRAVENOUS at 09:50

## 2017-09-19 RX ADMIN — INSULIN ASPART SCH: 100 INJECTION, SOLUTION INTRAVENOUS; SUBCUTANEOUS at 11:41

## 2017-09-19 RX ADMIN — METRONIDAZOLE SCH MLS/HR: 500 INJECTION, SOLUTION INTRAVENOUS at 19:40

## 2017-09-19 RX ADMIN — HEPARIN SODIUM SCH UNIT: 5000 INJECTION, SOLUTION INTRAVENOUS; SUBCUTANEOUS at 21:07

## 2017-09-19 RX ADMIN — ACETAMINOPHEN SCH MLS/HR: 10 INJECTION, SOLUTION INTRAVENOUS at 02:55

## 2017-09-19 RX ADMIN — FLUCONAZOLE SCH MLS/HR: 2 INJECTION, SOLUTION INTRAVENOUS at 09:15

## 2017-09-19 RX ADMIN — METRONIDAZOLE SCH MLS/HR: 500 INJECTION, SOLUTION INTRAVENOUS at 13:58

## 2017-09-19 RX ADMIN — POTASSIUM CHLORIDE SCH MLS/HR: 14.9 INJECTION, SOLUTION INTRAVENOUS at 08:16

## 2017-09-19 RX ADMIN — POTASSIUM CHLORIDE SCH MLS/HR: 14.9 INJECTION, SOLUTION INTRAVENOUS at 07:18

## 2017-09-19 RX ADMIN — SODIUM CHLORIDE, PRESERVATIVE FREE PRN ML: 5 INJECTION INTRAVENOUS at 14:33

## 2017-09-19 RX ADMIN — LATANOPROST SCH DROPS: 50 SOLUTION OPHTHALMIC at 21:07

## 2017-09-19 NOTE — PROVIDER PROGRESS NOTE
Subjective





- Prog Note Date


Prog Note Date: 09/19/17


Prog Note Time: 18:29





- Subjective


Pt reports feeling: Improved


Subjective: 





I have been able to get him off levophed by this afternoon.  He is still on IV 

fluids at 100 cc an hour.  He has been seen by palliative care.





On review of systems he is tearful.  Tells me he just does not feel good but he 

cannot tell me where he hurts.  Do you have a headache? yes, 


do have chest pain? yes, 


do your bones hurt? yes, and he really hurts over his feet and shins. 


 But he denies coughing, Rigors.  All he can tell me is that everything hurts 

all over.





Current Medications





- Current Medications


Current Medications: 








Active Medications





Acetaminophen (Tylenol)  650 mg PO Q4HR PRN


   PRN Reason: Pain 1 to 4


Albuterol/Ipratropium (Duoneb)  3 ml INH Q6H PRN


   PRN Reason: Wheezing


   Last Admin: 09/19/17 07:42 Dose:  3 ml


Budesonide (Pulmicort)  0.5 mg INH RTBID DON


   Last Admin: 09/19/17 07:43 Dose:  0.5 mg


Formoterol Fumarate (Perforomist)  20 mcg INH RTBID DON


   Last Admin: 09/19/17 07:42 Dose:  20 mcg


Haloperidol (Haldol Inj)  0.5 mg IVP Q6H PRN


   PRN Reason: Agitation


Heparin Sodium (Porcine) ()  2,500 unit SUBQ BID [facility]


   Last Admin: 09/19/17 09:44 Dose:  2,500 unit


Fluconazole (Diflucan 200 Mg/100 Ml)  50 mls @ 100 mls/hr IV DAILY [facility]


   Last Admin: 09/19/17 09:15 Dose:  100 mls/hr


Metronidazole (Flagyl 500 Mg/100 Ml)  100 mls @ 100 mls/hr IV Q6H [facility]


   Last Admin: 09/19/17 13:58 Dose:  100 mls/hr


Imipenem/Cilastatin Sodium 500 (mg/ Sodium Chloride)  100 mls @ 200 mls/hr IV 

Q8H [facility]


   Last Admin: 09/19/17 17:26 Dose:  200 mls/hr


Acetaminophen (Ofirmev)  100 mls @ 400 mls/hr IV Q8H [facility]


   Stop: 09/20/17 10:59


   Last Admin: 09/19/17 10:48 Dose:  400 mls/hr


Norepinephrine Bitartrate 8 mg (/ Dextrose)  250 mls @ 11.25 mls/hr IV .[id number] 

DON; 6 MCG/MIN


   PRN Reason: Protocol


   Last Admin: 09/19/17 14:18 Dose:  Not Given


Insulin Aspart (Novolog)  1 - 5 unit SUBQ 0800,1200,1700,2100 DON


   PRN Reason: Protocol


   Last Admin: 09/19/17 11:41 Dose:  Not Given


Latanoprost (Xalatan Ophth Drops)  1 drops EACHEYE QPM [facility]


   Last Admin: 09/18/17 21:09 Dose:  1 drops


Pantoprazole Sodium (Protonix)  40 mg PO QDAC DON


Potassium Chloride (K-Dur)  40 meq PO ONCE [facility]


   Stop: 09/19/17 20:00


Sodium Chloride (Normal Saline Flush 0.9%)  10 ml IVP Q8HR [facility]


   Last Admin: 09/19/17 14:10 Dose:  10 ml


Sodium Chloride (Normal Saline Flush 0.9%)  20 ml IVP PRN PRN


   PRN Reason: After Blood Draw


   Last Admin: 09/19/17 14:33 Dose:  20 ml





 





Acetaminophen 325 mg PO Q4H PRN 11/26/16 


Fluticasone/Salmeterol [Advair 100-50 Diskus] 1 inh INH BID 11/26/16 


Gabapentin [Neurontin] 100 mg PO TID 07/22/17 


Ipratropium/Albuterol [Duoneb] 3 ml INH Q6H PRN 07/22/17 


Lorazepam 0.25 mg PO TID PRN 07/22/17 


Mirtazapine [Remeron] 15 mg PO QPM 07/22/17 


Tamsulosin HCl [Flomax] 0.4 mg PO QPM 07/22/17 











Objective





- Vital Signs/Intake & Output


Reviewed Vital Signs: Yes


Vital Signs: 





 Vital Signs x48h











  Temp Pulse Pulse Resp BP Pulse Ox


 


 09/19/17 18:24    96  24  125/87 H  93


 


 09/19/17 17:00    98  22  133/64 H  95


 


 09/19/17 16:00    92  25 H  113/77  95


 


 09/19/17 15:30   93   20  


 


 09/19/17 15:00    88  22  121/56 L  95


 


 09/19/17 14:00    97  22  108/57 L  94


 


 09/19/17 13:00    100  21  108/52 L  93


 


 09/19/17 12:00  37 C   101 H  22  120/59 L  94


 


 09/19/17 11:00    102 H  28 H  99/55 L  93











Intake & Output: 





 Intake & Output











 09/16/17 09/17/17 09/18/17 09/19/17





 23:59 23:59 23:59 23:59


 


Intake Total   3278 [id number]


 


Output Total   2571 1527


 


Balance   707 2381














- Objective


General Appearance: positive: No acute distress, Lethargic


Eyes Bilateral: positive: PERRL


ENT: positive: Dry mucous membranes (drank 500 cc per RN)


Neck: positive: No JVD.  negative: Stiff neck, Carotid bruit


Respiratory: positive: Chest non-tender, No respiratory distress (but he had 

gurgling lungs on admit yesterday am), Rhonchi.  negative: Wheezes, Rales


Cardiovascular: positive: Regular rate & rhythm (he was tachy to 108 yesterday 

and down to 80's today), Systolic murmur.  negative: Gallop/S4, Friction rub


Abdomen: positive: Non-tender, No organomegaly, No distention, Abnml bowel 

sounds (quiet).  negative: Guarding, Rebound


Skin: positive: Dry, Pallor


Extremities: positive: No pedal edema


Neurologic/Psychiatric: positive: Motor nml (but generalized weakness), 

Disoriented to place, Disoriented to time, Sensory loss (of feet, hurts to 

touch from neuropathy), Depressed mood/affect





- Lab Results


Fish Bones: 


 09/18/17 06:56





 09/19/17 05:10


Other Labs: 





 Lab Results x24hrs











  09/19/17 09/19/17 09/19/17 Range/Units





  16:29 11:38 08:40 


 


Sodium     (135-145)  mmol/L


 


Potassium     (3.5-5.0)  mmol/L


 


Chloride     (101-111)  mmol/L


 


Carbon Dioxide     (21-32)  mmol/L


 


Anion Gap     (6-13)  


 


BUN     (6-20)  mg/dL


 


Creatinine     (0.6-1.2)  mg/dL


 


Estimated GFR (MDRD)     (>89)  


 


Glucose     ()  mg/dL


 


POC Whole Bld Glucose  83  135 H  126 H  (70 - 100)  mg/dL


 


Calcium     (8.5-10.3)  mg/dL


 


Phosphorus     (2.5-4.6)  mg/dL


 


Magnesium     (1.7-2.8)  mg/dL


 


Albumin     (3.2-5.5)  g/dL














  09/19/17 09/19/17 09/18/17 Range/Units





  05:10 05:10 21:12 


 


Sodium   139   (135-145)  mmol/L


 


Potassium   3.4 L   (3.5-5.0)  mmol/L


 


Chloride   108   (101-111)  mmol/L


 


Carbon Dioxide   27   (21-32)  mmol/L


 


Anion Gap   4.0 L   (6-13)  


 


BUN   19   (6-20)  mg/dL


 


Creatinine   0.8   (0.6-1.2)  mg/dL


 


Estimated GFR (MDRD)   92   (>89)  


 


Glucose   135 H   ()  mg/dL


 


POC Whole Bld Glucose    151 H  (70 - 100)  mg/dL


 


Calcium   8.2 L   (8.5-10.3)  mg/dL


 


Phosphorus   1.9 L   (2.5-4.6)  mg/dL


 


Magnesium  1.5 L    (1.7-2.8)  mg/dL


 


Albumin   2.5 L   (3.2-5.5)  g/dL














  09/18/17 09/18/17 09/18/17 Range/Units





  17:08 11:42 08:01 


 


Sodium     (135-145)  mmol/L


 


Potassium     (3.5-5.0)  mmol/L


 


Chloride     (101-111)  mmol/L


 


Carbon Dioxide     (21-32)  mmol/L


 


Anion Gap     (6-13)  


 


BUN     (6-20)  mg/dL


 


Creatinine     (0.6-1.2)  mg/dL


 


Estimated GFR (MDRD)     (>89)  


 


Glucose     ()  mg/dL


 


POC Whole Bld Glucose  109 H  150 H  145 H  (70 - 100)  mg/dL


 


Calcium     (8.5-10.3)  mg/dL


 


Phosphorus     (2.5-4.6)  mg/dL


 


Magnesium     (1.7-2.8)  mg/dL


 


Albumin     (3.2-5.5)  g/dL














Assessment/Plan





- Problem List


(1) Severe sepsis with septic shock


Impression: 


As stated in history of present illness, the patient received aggressive IV 

fluid resuscitation.  Levophed and a transfer to ICU.  This is in the face of 

an elderly gentleman who has had multiple admissions, is a DO NOT RESUSCITATE, 

and has been gently and steadily failing over the last 1-2 years.





His current episode of sepsis with shock is resolving today.  Will stop IV 

fluids and IV lock for now.  Stop the levophed.  However we will resume it if 

needed.  His power of  still wants the patient treated for as long as 

his quality of life is determined to be acceptable.  In between episodes of 

infection and hospitalization he goes back to being a forgetful, charming, 

nati person who enjoys his Oreo cookies.  He had his Oreo cookies the day 

before admission.





peripheral blood culture neg


central line blood culture neg


urine C&S with <85769 urogenital dar


C dif negative


MRSA nares positive


WBC is stil 28K





Day#2 Imipenem, flagyl, diflucan








(2) Multisystem organ failure


Impression: 


With acute renal failure, elevated bilirubin, lactic acidosis, hypotension.  

All have improved somewhat today but not normalized yet.  We will continue with 

IV antibiotics.  See #1





Bili has been elevated a year ago but was only up to 1.8. Has multiple stones 

on CT abd but no acute cholecystitis in the past. currently without abd pain in 

RUQ


does have bilateal kidney stones but no current flank pain per se. again, hurts 

all over.





recheck lab status in am. If still with elevatated lactic acid may need change 

in abx. 








(3) Acute metabolic encephalopathy


Impression: 


Improved.  He is speaking complete sentences, able to express his emotions.  

Yesterday he was very very lethargic.  While he is still sleepy, he is 

returning to his baseline personality








(4) History of chronic urinary tract infection


Impression: 


At this time this is the presumed source of his sepsis and shock.  He has a 

long history of urinary retention, requiring a chronic indwelling Gupta, and 

multiple multiple bacteria with multiple drug resistance.  He is currently 

responding to the current drug regime.  No change at this time.  We will make 

sure Gupta is changed out.





Blood cultures negative so far, chest x-ray negative for pneumonia








(5) Senescence


Impression: 


With dementia.  Currently living at a skilled nursing facility since February 

of this year.  Power of /close friends, are his advocates.  He has had 

a palliative care consult with Talita Viera and continues to be seen by her.  

Yesterday and today's notes appreciated.





Goal is to return to skilled nursing facility after this acute episode of care.

  He will continue to be readmitted as needed until his quality of life has 

deteriorated to the point that his advocates feel that comfort measures are 

required.








(6) DM type 2, uncontrolled, with neuropathy


Impression: 


not ususally on meds at SNF. Here will be on SS after ac qid checks.

## 2017-09-19 NOTE — PROVIDER PROGRESS NOTE
Palliative Care Follow Up





- Referral


Referring Provider: Dr. Lor Chaney


Time of Visit: 3711-3124


Referral setting: Hospitalized patient


Referral Reason: Goals of care





- Information Sources


Records Reviewed: RN notes reviewed


History obtained from: Other (clinical staff)


Exam limitations: Clinical condition (patient with poor STM, more awake and 

able to participate)





- History of Present Illness Update


Brief HPI Update: 


Nisha 83-year-old gentleman who has severe COPD, with recurrent pneumonias, 

recurrent urinary tract infections, and recurrent hospitalizations. He does 

have an indwelling catheter for BPH, his cultures currently not available, but 

has been showing increasing multiple drug resistance. His white count has 

remained elevated today, they are titrating down his pressor with good 

response. He is more awake and alert, is quite tearful, but reports he just 

doesn't feel good. He is a permanent resident at [facility], and the goal is for 

him to return there, at time of discharge and/or if transitions to comfort 

care. He continues to remain fragile, but up to this point, has returned back 

to an acceptable level of functioning and quality of life deemed by his friends 

who are his DPOAs.








Patient denies pain or distress, denies shortness of breath, is unable to 

identify how he "doesn't feel well". He does have crackles in his left lower 

lobe, and some expiratory wheezes. Reports he has no appetite, denies nausea or 

stomach distress, did have difficulties swallowing the previous evening.





Social History





- Living Situation


Living arrangement: Nursing [facility] ([facility]; has bee resident since 2/17.)





Medications/Allergies





- Medications


Active Medication List: 





Active Medications





Acetaminophen (Tylenol)  650 mg PO Q4HR PRN


   PRN Reason: Pain 1 to 4


Albuterol/Ipratropium (Duoneb)  3 ml INH Q6H PRN


   PRN Reason: Wheezing


   Last Admin: 09/19/17 07:42 Dose:  3 ml


Budesonide (Pulmicort)  0.5 mg INH RTBID [facility]


   Last Admin: 09/19/17 07:43 Dose:  0.5 mg


Formoterol Fumarate (Perforomist)  20 mcg INH RTBID [facility]


   Last Admin: 09/19/17 07:42 Dose:  20 mcg


Haloperidol (Haldol Inj)  0.5 mg IVP Q6H PRN


   PRN Reason: Agitation


Heparin Sodium (Porcine) ()  2,500 unit SUBQ BID [facility]


   Last Admin: 09/19/17 09:44 Dose:  2,500 unit


Sodium Chloride (Normal Saline 0.9%)  1,000 mls @ 100 mls/hr IV .Q10H [facility]


   Last Admin: 09/19/17 01:34 Dose:  100 mls/hr


Fluconazole (Diflucan 200 Mg/100 Ml)  50 mls @ 100 mls/hr IV DAILY [facility]


   Last Admin: 09/19/17 09:15 Dose:  100 mls/hr


Metronidazole (Flagyl 500 Mg/100 Ml)  100 mls @ 100 mls/hr IV Q6H [facility]


   Last Admin: 09/19/17 13:58 Dose:  100 mls/hr


Imipenem/Cilastatin Sodium 500 (mg/ Sodium Chloride)  100 mls @ 200 mls/hr IV 

Q8H [facility]


   Last Admin: 09/19/17 09:50 Dose:  200 mls/hr


Acetaminophen (Ofirmev)  100 mls @ 400 mls/hr IV Q8H [facility]


   Stop: 09/20/17 10:59


   Last Admin: 09/19/17 10:48 Dose:  400 mls/hr


Norepinephrine Bitartrate 8 mg (/ Dextrose)  250 mls @ 11.25 mls/hr IV .[id number] 

DON; 6 MCG/MIN


   PRN Reason: Protocol


   Last Admin: 09/19/17 14:18 Dose:  Not Given


Insulin Aspart (Novolog)  1 - 5 unit SUBQ 0800,1200,1700,2100 DON


   PRN Reason: Protocol


   Last Admin: 09/19/17 11:41 Dose:  Not Given


Latanoprost (Xalatan Ophth Drops)  1 drops EACHEYE QPM [facility]


   Last Admin: 09/18/17 21:09 Dose:  1 drops


Pantoprazole Sodium (Protonix)  40 mg IVP QDAC [facility]


   Last Admin: 09/19/17 06:35 Dose:  40 mg


Sodium Chloride (Normal Saline Flush 0.9%)  10 ml IVP Q8HR [facility]


   Last Admin: 09/19/17 14:10 Dose:  10 ml


Sodium Chloride (Normal Saline Flush 0.9%)  20 ml IVP PRN PRN


   PRN Reason: After Blood Draw


   Last Admin: 09/19/17 14:33 Dose:  20 ml





 





Acetaminophen 325 mg PO Q4H PRN 11/26/16 


Fluticasone/Salmeterol [Advair 100-50 Diskus] 1 inh INH BID 11/26/16 


Gabapentin [Neurontin] 100 mg PO TID 07/22/17 


Ipratropium/Albuterol [Duoneb] 3 ml INH Q6H PRN 07/22/17 


Lorazepam 0.25 mg PO TID PRN 07/22/17 


Mirtazapine [Remeron] 15 mg PO QPM 07/22/17 


Tamsulosin HCl [Flomax] 0.4 mg PO QPM 07/22/17 











- Allergies


Allergies/Adverse Reactions: 


 Allergies











Allergy/AdvReac Type Severity Reaction Status Date / Time


 


No Known Drug Allergies Allergy   Verified 07/22/17 13:07














Review of Systems





- Constitutional


Constitutional: reports: Poor appetite





- Ears, Nose & Throat


Ears, Nose & Throat: reports: Hearing loss (mild)





- Cardiovascular


Cariovascular: denies: Chest pain





- Gastrointestinal


Gastrointestinal: reports: Other (nurse reports to bowel movements today; 

patient confirms dislikes constipation so is pleased)





- Genitourinary


Genitourinary: reports: Other (aldrich catheter for BPH)





- Musculoskeletal


Musculoskeletal: reports: Stiffness, Other (needing assist for bed mobililty)





- Neurological


Neurological: reports: General weakness, Memory problems





- Hematologic/Lymphatic


Hematologic/Lymphatic: reports: Recurrent infections





- Other Findings


Other Findings: 


limited ROS related to patient's abililty to recall and participate








Physical Examination





- Vital Signs


Vital Signs: 





 Vital Signs x48h











  Temp Pulse Pulse Resp BP Pulse Ox


 


 09/19/17 14:00    97  22  108/57 L  94


 


 09/19/17 13:00    100  21  108/52 L  93


 


 09/19/17 12:00  37 C   101 H  22  120/59 L  94


 


 09/19/17 11:00    102 H  28 H  99/55 L  93


 


 09/19/17 10:00    108 H  19  108/51 L  93


 


 09/19/17 09:00    105 H  22  116/50 L  93


 


 09/19/17 08:00  36.8 C   103 H  24  106/53 L  92


 


 09/19/17 07:30   93   16  


 


 09/19/17 07:00    95  27 H  131/55 H  97














- Physical Exam


General Appearance: positive: Mild distress, Lethargic


Eyes Bilateral: positive: Conjunctivae nml


ENT: positive: Dry mucous membranes


Neck: positive: No JVD, Trachea midline


Respiratory: positive: Other (as noted in HPI)


Cardiovascular: positive: Tachycardia


Abdomen: positive: Nml bowel sounds, Other (scrotal swelling)


Skin: positive: Pallor, Rash (dry flakey skin)


Extremities: positive: No pedal edema


Neurologic/Psychiatric: positive: Disoriented to person, Disoriented to place, 

Depressed mood/affect





Palliative Care





- POLST


Patient has POLST: Yes


POLST Status: DNR, Limited Interventions


Pain: Pain unchanged, Severity (lower extremity neuropathy; tender to touch 

when examined)


Drowsiness: Mild (1-3)


Nausea: None


Anxiety: Mild (1-3)


Anorexia: Severe (7-10)


Constipation: No


Feelings of wellbeing/Perceived Quality of Life: Worsening


Performance Status: 


Patient at baseline is wheelchair dependent, is able to wheel himself somewhat 

independently in facility. Is dependent for ADLs. Is able to self feed. 

Currently needing feeding assistance, cuing, and is bed bound.








- Palliative Care


Discussion: 


Patient is tearful through visit, is wondering if "this is it". If it was his 

time, and if he was going to come get him. When asked for clarification, he is 

talking about GOD. Asked him if he is afraid to die, that he wasn't scared but 

preferred to live. He is aware that we are in a precarious situation, waiting 

to see if he will get better or to worsen. Did followup with his friends Jemima 

and Hollis, but up to date and current status, he continued to struggle as far 

his weighing benefits and burdens and care decisions for Yoshi. Encourage them 

to currently does take it at day at that time, recognizing he is quite frail, 

Jemima reflects this been told as long as 2 years ago that he was going to die 

and never return home. She does admit though, and he has continued to decline, 

this still perceives he enjoys his current level of functioning as support at 

[facility]. They're coming to visit, and provide support, this is shared with 

Yoshi. He is very much attached and tearful at the ongoing support his friends 

have provided. Palliative care  has made a visit for support as well, 

he sees him regularly over at [facility]. Darlene really enjoys country music.








Results





- Lab Results


Fish Bones: 


 09/18/17 06:56





 09/19/17 05:10


Lab and Imaging Results: 





 Lab Results x24hrs











  09/19/17 09/19/17 09/19/17 Range/Units





  11:38 08:40 05:10 


 


Sodium     (135-145)  mmol/L


 


Potassium     (3.5-5.0)  mmol/L


 


Chloride     (101-111)  mmol/L


 


Carbon Dioxide     (21-32)  mmol/L


 


Anion Gap     (6-13)  


 


BUN     (6-20)  mg/dL


 


Creatinine     (0.6-1.2)  mg/dL


 


Estimated GFR (MDRD)     (>89)  


 


Glucose     ()  mg/dL


 


POC Whole Bld Glucose  135 H  126 H   (70 - 100)  mg/dL


 


Calcium     (8.5-10.3)  mg/dL


 


Phosphorus     (2.5-4.6)  mg/dL


 


Magnesium    1.5 L  (1.7-2.8)  mg/dL


 


Albumin     (3.2-5.5)  g/dL














  09/19/17 09/18/17 09/18/17 Range/Units





  05:10 21:12 17:08 


 


Sodium  139    (135-145)  mmol/L


 


Potassium  3.4 L    (3.5-5.0)  mmol/L


 


Chloride  108    (101-111)  mmol/L


 


Carbon Dioxide  27    (21-32)  mmol/L


 


Anion Gap  4.0 L    (6-13)  


 


BUN  19    (6-20)  mg/dL


 


Creatinine  0.8    (0.6-1.2)  mg/dL


 


Estimated GFR (MDRD)  92    (>89)  


 


Glucose  135 H    ()  mg/dL


 


POC Whole Bld Glucose   151 H  109 H  (70 - 100)  mg/dL


 


Calcium  8.2 L    (8.5-10.3)  mg/dL


 


Phosphorus  1.9 L    (2.5-4.6)  mg/dL


 


Magnesium     (1.7-2.8)  mg/dL


 


Albumin  2.5 L    (3.2-5.5)  g/dL














  09/18/17 09/18/17 Range/Units





  11:42 08:01 


 


Sodium    (135-145)  mmol/L


 


Potassium    (3.5-5.0)  mmol/L


 


Chloride    (101-111)  mmol/L


 


Carbon Dioxide    (21-32)  mmol/L


 


Anion Gap    (6-13)  


 


BUN    (6-20)  mg/dL


 


Creatinine    (0.6-1.2)  mg/dL


 


Estimated GFR (MDRD)    (>89)  


 


Glucose    ()  mg/dL


 


POC Whole Bld Glucose  150 H  145 H  (70 - 100)  mg/dL


 


Calcium    (8.5-10.3)  mg/dL


 


Phosphorus    (2.5-4.6)  mg/dL


 


Magnesium    (1.7-2.8)  mg/dL


 


Albumin    (3.2-5.5)  g/dL














Impression and Recommendations





- Palliative Care


Impression: 


This is an 83-year-old gentleman with end-stage COPD, and multiple other 

comorbidities, adding to this risk for continued decline. He is showing some 

improvement, this certainly remains fragile, both patient and friends are aware 

of the current concern regarding outcome for this hospitalization.





Recommendations/Counseling Done: 


1. Advanced COPD, patient denies current respiratory distress, remains at high 

risk for aspiration, and recurrent pneumonias.





2. Depression. The patient presents quite tearful today, his son baseline and 

at the present, does well with support from friends and Jackson. Would 

recommend when appropriate for oral meds to restart him on his Mirtazipine 15 

mg at bedtime





3. Advanced care planning. We'll continue support Kiet very DPOAs, and there 

struggled to oversee his health care needs, their main concern is that he 

remains comfortable and not to suffer. Aware he is currently in a precarious 

situation, the goal is to return to [facility] easier on discharge, or of 

transitioning to comfort measur


Time Spent: 


45 minutes with greater than 50% of this done in counseling and coordination of 

care regarding goals of care communication with clinical staff in followup with 

hospitalists. Providing anticipatory guidance to DPOAs.

## 2017-09-20 LAB
ALBUMIN/GLOB SERPL: 0.8 {RATIO} (ref 1–2.2)
ANION GAP SERPL CALCULATED.4IONS-SCNC: 5 MMOL/L (ref 6–13)
BASOPHILS NFR BLD AUTO: 0 10^3/UL (ref 0–0.1)
BASOPHILS NFR BLD AUTO: 0.2 %
BILIRUB BLD-MCNC: 1.9 MG/DL (ref 0.2–1)
BUN SERPL-MCNC: 13 MG/DL (ref 6–20)
CALCIUM UR-MCNC: 8.4 MG/DL (ref 8.5–10.3)
CHLORIDE SERPL-SCNC: 104 MMOL/L (ref 101–111)
CO2 SERPL-SCNC: 25 MMOL/L (ref 21–32)
CREAT SERPLBLD-SCNC: 0.7 MG/DL (ref 0.6–1.2)
EOSINOPHIL # BLD AUTO: 0.3 10^3/UL (ref 0–0.7)
EOSINOPHIL NFR BLD AUTO: 2.5 %
ERYTHROCYTE [DISTWIDTH] IN BLOOD BY AUTOMATED COUNT: 13.5 % (ref 12–15)
GFRSERPLBLD MDRD-ARVRAT: 108 ML/MIN/{1.73_M2} (ref 89–?)
GLOBULIN SER-MCNC: 3.1 G/DL (ref 2.1–4.2)
GLUCOSE SERPL-MCNC: 112 MG/DL (ref 70–100)
HCT VFR BLD AUTO: 30.6 % (ref 42–52)
HGB UR QL STRIP: 10.2 G/DL (ref 14–18)
LYMPHOCYTES # SPEC AUTO: 1.2 10^3/UL (ref 1.5–3.5)
LYMPHOCYTES NFR BLD AUTO: 10.4 %
MAGNESIUM SERPL-MCNC: 1.5 MG/DL (ref 1.7–2.8)
MCH RBC QN AUTO: 31.5 PG (ref 27–31)
MCHC RBC AUTO-ENTMCNC: 33.3 G/DL (ref 32–36)
MCV RBC AUTO: 94.6 FL (ref 80–94)
MONOCYTES # BLD AUTO: 0.5 10^3/UL (ref 0–1)
MONOCYTES NFR BLD AUTO: 3.9 %
NEUTROPHILS # BLD AUTO: 9.6 10^3/UL (ref 1.5–6.6)
NEUTROPHILS # SNV AUTO: 11.6 X10^3/UL (ref 4.8–10.8)
NEUTROPHILS NFR BLD AUTO: 83 %
NRBC # BLD AUTO: 0 /100WBC
PDW BLD AUTO: 8.2 FL (ref 7.4–11.4)
PHOSPHATE BLD-MCNC: 2.3 MG/DL (ref 2.5–4.6)
POTASSIUM SERPL-SCNC: 3.8 MMOL/L (ref 3.5–5)
PROT SPEC-MCNC: 5.5 G/DL (ref 6.7–8.2)
RBC MAR: 3.24 10^6/UL (ref 4.7–6.1)
SODIUM SERPLBLD-SCNC: 134 MMOL/L (ref 135–145)
WBC # BLD: 11.6 X10^3/UL

## 2017-09-20 RX ADMIN — BUDESONIDE SCH MG: 0.5 SUSPENSION RESPIRATORY (INHALATION) at 07:30

## 2017-09-20 RX ADMIN — METRONIDAZOLE SCH MLS/HR: 500 INJECTION, SOLUTION INTRAVENOUS at 08:04

## 2017-09-20 RX ADMIN — METRONIDAZOLE SCH MLS/HR: 500 INJECTION, SOLUTION INTRAVENOUS at 02:09

## 2017-09-20 RX ADMIN — ACETAMINOPHEN SCH MLS/HR: 10 INJECTION, SOLUTION INTRAVENOUS at 03:18

## 2017-09-20 RX ADMIN — INSULIN ASPART SCH: 100 INJECTION, SOLUTION INTRAVENOUS; SUBCUTANEOUS at 08:05

## 2017-09-20 RX ADMIN — HEPARIN SODIUM SCH UNIT: 5000 INJECTION, SOLUTION INTRAVENOUS; SUBCUTANEOUS at 09:30

## 2017-09-20 RX ADMIN — IMIPENEM AND CILASTATIN SODIUM SCH MLS/HR: 500; 500 INJECTION, POWDER, FOR SOLUTION INTRAVENOUS at 01:31

## 2017-09-20 RX ADMIN — METRONIDAZOLE SCH MLS/HR: 500 INJECTION, SOLUTION INTRAVENOUS at 20:16

## 2017-09-20 RX ADMIN — PANTOPRAZOLE SODIUM SCH MG: 40 TABLET, DELAYED RELEASE ORAL at 06:24

## 2017-09-20 RX ADMIN — LATANOPROST SCH DROPS: 50 SOLUTION OPHTHALMIC at 21:29

## 2017-09-20 RX ADMIN — HEPARIN SODIUM SCH UNIT: 5000 INJECTION, SOLUTION INTRAVENOUS; SUBCUTANEOUS at 21:29

## 2017-09-20 RX ADMIN — SODIUM CHLORIDE, PRESERVATIVE FREE SCH: 5 INJECTION INTRAVENOUS at 21:22

## 2017-09-20 RX ADMIN — FORMOTEROL FUMARATE DIHYDRATE SCH MCG: 20 SOLUTION RESPIRATORY (INHALATION) at 07:30

## 2017-09-20 RX ADMIN — SODIUM PHOSPHATE, DIBASIC, ANHYDROUS, POTASSIUM PHOSPHATE, MONOBASIC, AND SODIUM PHOSPHATE, MONOBASIC, MONOHYDRATE SCH MG: 852; 155; 130 TABLET, COATED ORAL at 08:43

## 2017-09-20 RX ADMIN — INSULIN ASPART SCH: 100 INJECTION, SOLUTION INTRAVENOUS; SUBCUTANEOUS at 21:08

## 2017-09-20 RX ADMIN — METRONIDAZOLE SCH MLS/HR: 500 INJECTION, SOLUTION INTRAVENOUS at 14:14

## 2017-09-20 RX ADMIN — SODIUM CHLORIDE, PRESERVATIVE FREE PRN ML: 5 INJECTION INTRAVENOUS at 05:21

## 2017-09-20 RX ADMIN — IMIPENEM AND CILASTATIN SODIUM SCH MLS/HR: 500; 500 INJECTION, POWDER, FOR SOLUTION INTRAVENOUS at 09:39

## 2017-09-20 RX ADMIN — FORMOTEROL FUMARATE DIHYDRATE SCH MCG: 20 SOLUTION RESPIRATORY (INHALATION) at 19:10

## 2017-09-20 RX ADMIN — IMIPENEM AND CILASTATIN SODIUM SCH MLS/HR: 500; 500 INJECTION, POWDER, FOR SOLUTION INTRAVENOUS at 17:35

## 2017-09-20 RX ADMIN — INSULIN ASPART SCH: 100 INJECTION, SOLUTION INTRAVENOUS; SUBCUTANEOUS at 17:18

## 2017-09-20 RX ADMIN — SODIUM CHLORIDE, PRESERVATIVE FREE SCH ML: 5 INJECTION INTRAVENOUS at 14:15

## 2017-09-20 RX ADMIN — FLUCONAZOLE SCH MLS/HR: 2 INJECTION, SOLUTION INTRAVENOUS at 09:38

## 2017-09-20 RX ADMIN — SODIUM CHLORIDE, PRESERVATIVE FREE SCH ML: 5 INJECTION INTRAVENOUS at 05:21

## 2017-09-20 RX ADMIN — SODIUM PHOSPHATE, DIBASIC, ANHYDROUS, POTASSIUM PHOSPHATE, MONOBASIC, AND SODIUM PHOSPHATE, MONOBASIC, MONOHYDRATE SCH MG: 852; 155; 130 TABLET, COATED ORAL at 05:21

## 2017-09-20 RX ADMIN — BUDESONIDE SCH MG: 0.5 SUSPENSION RESPIRATORY (INHALATION) at 19:10

## 2017-09-20 RX ADMIN — INSULIN ASPART SCH: 100 INJECTION, SOLUTION INTRAVENOUS; SUBCUTANEOUS at 12:00

## 2017-09-20 NOTE — PROVIDER PROGRESS NOTE
Subjective





- Prog Note Date


Prog Note Date: 09/20/17


Prog Note Time: 08:04





- Subjective


Pt reports feeling: No change


Subjective: 





He does know why he is here.  Does not even remember being sick.  He cautiously 

tells me that he thinks he is okay.  Had a hurts all over and is really really 

tired but he denies any chest pain abdominal pain.  Fed himself breakfast this 

morning.  Oriented to himself, that he is in the hospital but that is about it.





Current Medications





- Current Medications


Current Medications: 








Active Medications





Acetaminophen (Tylenol)  650 mg PO Q4HR PRN


   PRN Reason: Pain 1 to 4


Albuterol/Ipratropium (Duoneb)  3 ml INH Q6H PRN


   PRN Reason: Wheezing


   Last Admin: 09/19/17 07:42 Dose:  3 ml


Budesonide (Pulmicort)  0.5 mg INH RTBID DON


   Last Admin: 09/20/17 07:30 Dose:  0.5 mg


Formoterol Fumarate (Perforomist)  20 mcg INH RTBID DON


   Last Admin: 09/20/17 07:30 Dose:  20 mcg


Haloperidol (Haldol Inj)  0.5 mg IVP Q6H PRN


   PRN Reason: Agitation


Heparin Sodium (Porcine) ()  2,500 unit SUBQ BID [facility]


   Last Admin: 09/19/17 21:07 Dose:  2,500 unit


Fluconazole (Diflucan 200 Mg/100 Ml)  50 mls @ 100 mls/hr IV DAILY [facility]


   Last Admin: 09/19/17 09:15 Dose:  100 mls/hr


Metronidazole (Flagyl 500 Mg/100 Ml)  100 mls @ 100 mls/hr IV Q6H [facility]


   Last Admin: 09/20/17 08:04 Dose:  100 mls/hr


Imipenem/Cilastatin Sodium 500 (mg/ Sodium Chloride)  100 mls @ 200 mls/hr IV 

Q8H [facility]


   Last Admin: 09/20/17 01:31 Dose:  200 mls/hr


Acetaminophen (Ofirmev)  100 mls @ 400 mls/hr IV Q8H [facility]


   Stop: 09/20/17 10:59


   Last Admin: 09/20/17 03:18 Dose:  400 mls/hr


Norepinephrine Bitartrate 8 mg (/ Dextrose)  250 mls @ 11.25 mls/hr IV .[id number] 

DON; 6 MCG/MIN


   PRN Reason: Protocol


   Last Admin: 09/19/17 14:18 Dose:  Not Given


Insulin Aspart (Novolog)  1 - 5 unit SUBQ 0800,1200,1700,2100 DON


   PRN Reason: Protocol


   Last Admin: 09/20/17 08:05 Dose:  Not Given


Latanoprost (Xalatan Ophth Drops)  1 drops EACHEYE QPM [facility]


   Last Admin: 09/19/17 21:07 Dose:  1 drops


Pantoprazole Sodium (Protonix)  40 mg PO QDAC [facility]


   Last Admin: 09/20/17 06:24 Dose:  40 mg


Sodium Chloride (Normal Saline Flush 0.9%)  10 ml IVP Q8HR [facility]


   Last Admin: 09/20/17 05:21 Dose:  10 ml


Sodium Chloride (Normal Saline Flush 0.9%)  20 ml IVP PRN PRN


   PRN Reason: After Blood Draw


   Last Admin: 09/20/17 05:21 Dose:  20 ml





 





Acetaminophen 325 mg PO Q4H PRN 11/26/16 


Fluticasone/Salmeterol [Advair 100-50 Diskus] 1 inh INH BID 11/26/16 


Gabapentin [Neurontin] 100 mg PO TID 07/22/17 


Ipratropium/Albuterol [Duoneb] 3 ml INH Q6H PRN 07/22/17 


Lorazepam 0.25 mg PO TID PRN 07/22/17 


Mirtazapine [Remeron] 15 mg PO QPM 07/22/17 


Tamsulosin HCl [Flomax] 0.4 mg PO QPM 07/22/17 











Objective





- Vital Signs/Intake & Output


Reviewed Vital Signs: Yes


Vital Signs: 





 Vital Signs x48h











  Temp Pulse Pulse Resp BP Pulse Ox


 


 09/20/17 07:30   97   24  


 


 09/20/17 06:07    100  27 H  142/73 H  93


 


 09/20/17 05:20    95  24  144/73 H  92


 


 09/20/17 04:00  36.3 C L   99  20  134/82 H  92


 


 09/20/17 03:00    93  24  103/71  93


 


 09/20/17 02:00    90  23  135/63 H  92


 


 09/20/17 01:00    97  27 H  122/87 H  92


 


 09/20/17 00:18  36.5 C   96  25 H  139/75 H  92











Intake & Output: 





 Intake & Output











 09/17/17 09/18/17 09/19/17 09/20/17





 23:59 23:59 23:59 23:59


 


Intake Total  3278 4238 460


 


Output Total  3628 6155 1163


 


Balance  707 2536 -796














- Objective


General Appearance: positive: No acute distress, Alert, Other (diffuse mild 

seborrheic flaking of face and scalp)


Eyes Bilateral: positive: PERRL, EOMI


ENT: positive: Other (dentures)


Neck: positive: No JVD.  negative: Stiff neck, Carotid bruit


Respiratory: positive: Chest non-tender, No respiratory distress, Rhonchi (fine 

rhonchi / crackles at base (no longer gurling like he was on admit)).  negative

: Wheezes, Rales


Cardiovascular: positive: Regular rate & rhythm, Systolic murmur.  negative: 

Gallop/S4, Friction rub


Abdomen: positive: No organomegaly, Abnml bowel sounds (hypoactive), Other (

mild distension. new aldrich done 2 days PTA.).  negative: Guarding, Rebound


Skin: positive: Warm, Dry


Extremities: positive: Pedal edema


Neurologic/Psychiatric: positive: CN's nml (2-12), Mood/affect nml (he's 

cheerful, flirtatious), Disoriented to person, Weakness (diffuse).  negative: 

Motor nml (wheelchair bound)





- Lab Results


Fish Bones: 


 09/20/17 04:25





 09/20/17 04:25


Other Labs: 





 Lab Results x24hrs











  09/20/17 09/20/17 09/20/17 Range/Units





  07:33 04:25 04:25 


 


WBC    11.6 H  (4.8-10.8)  x10^3/uL


 


RBC    3.24 L  (4.70-6.10)  10^6/uL


 


Hgb    10.2 L  (14.0-18.0)  g/dL


 


Hct    30.6 L  (42.0-52.0)  %


 


MCV    94.6 H  (80.0-94.0)  fL


 


MCH    31.5 H  (27.0-31.0)  pg


 


MCHC    33.3  (32.0-36.0)  g/dL


 


RDW    13.5  (12.0-15.0)  %


 


Plt Count    205  (130-450)  10^3/uL


 


MPV    8.2  (7.4-11.4)  fL


 


Neut #    9.6 H  (1.5-6.6)  10^3/uL


 


Lymph #    1.2 L  (1.5-3.5)  10^3/uL


 


Mono #    0.5  (0.0-1.0)  10^3/uL


 


Eos #    0.3  (0.0-0.7)  10^3/uL


 


Baso #    0.0  (0.0-0.1)  10^3/uL


 


Absolute Nucleated RBC    0.00  x10^3/uL


 


Nucleated RBCs    0.0  /100WBC


 


Sodium   134 L   (135-145)  mmol/L


 


Potassium   3.8   (3.5-5.0)  mmol/L


 


Chloride   104   (101-111)  mmol/L


 


Carbon Dioxide   25   (21-32)  mmol/L


 


Anion Gap   5.0 L   (6-13)  


 


BUN   13   (6-20)  mg/dL


 


Creatinine   0.7   (0.6-1.2)  mg/dL


 


Estimated GFR (MDRD)   108   (>89)  


 


Glucose   112 H   ()  mg/dL


 


POC Whole Bld Glucose  137 H    (70 - 100)  mg/dL


 


Lactic Acid     (0.5-2.2)  mmol/L


 


Calcium   8.4 L   (8.5-10.3)  mg/dL


 


Ionized Calcium   NO   


 


Phosphorus     (2.5-4.6)  mg/dL


 


Magnesium     (1.7-2.8)  mg/dL


 


Total Bilirubin   1.9 H   (0.2-1.0)  mg/dL


 


AST   10   (10-42)  IU/L


 


ALT   < 10 L   (10-60)  IU/L


 


Alkaline Phosphatase   65   ()  IU/L


 


Total Protein   5.5 L   (6.7-8.2)  g/dL


 


Albumin   2.4 L   (3.2-5.5)  g/dL


 


Globulin   3.1   (2.1-4.2)  g/dL


 


Albumin/Globulin Ratio   0.8 L   (1.0-2.2)  














  09/20/17 09/20/17 09/19/17 Range/Units





  04:25 04:25 21:08 


 


WBC     (4.8-10.8)  x10^3/uL


 


RBC     (4.70-6.10)  10^6/uL


 


Hgb     (14.0-18.0)  g/dL


 


Hct     (42.0-52.0)  %


 


MCV     (80.0-94.0)  fL


 


MCH     (27.0-31.0)  pg


 


MCHC     (32.0-36.0)  g/dL


 


RDW     (12.0-15.0)  %


 


Plt Count     (130-450)  10^3/uL


 


MPV     (7.4-11.4)  fL


 


Neut #     (1.5-6.6)  10^3/uL


 


Lymph #     (1.5-3.5)  10^3/uL


 


Mono #     (0.0-1.0)  10^3/uL


 


Eos #     (0.0-0.7)  10^3/uL


 


Baso #     (0.0-0.1)  10^3/uL


 


Absolute Nucleated RBC     x10^3/uL


 


Nucleated RBCs     /100WBC


 


Sodium     (135-145)  mmol/L


 


Potassium     (3.5-5.0)  mmol/L


 


Chloride     (101-111)  mmol/L


 


Carbon Dioxide     (21-32)  mmol/L


 


Anion Gap     (6-13)  


 


BUN     (6-20)  mg/dL


 


Creatinine     (0.6-1.2)  mg/dL


 


Estimated GFR (MDRD)     (>89)  


 


Glucose     ()  mg/dL


 


POC Whole Bld Glucose    137 H  (70 - 100)  mg/dL


 


Lactic Acid  0.7    (0.5-2.2)  mmol/L


 


Calcium     (8.5-10.3)  mg/dL


 


Ionized Calcium     


 


Phosphorus   2.3 L   (2.5-4.6)  mg/dL


 


Magnesium   1.5 L   (1.7-2.8)  mg/dL


 


Total Bilirubin     (0.2-1.0)  mg/dL


 


AST     (10-42)  IU/L


 


ALT     (10-60)  IU/L


 


Alkaline Phosphatase     ()  IU/L


 


Total Protein     (6.7-8.2)  g/dL


 


Albumin     (3.2-5.5)  g/dL


 


Globulin     (2.1-4.2)  g/dL


 


Albumin/Globulin Ratio     (1.0-2.2)  














  09/19/17 09/19/17 09/19/17 Range/Units





  16:29 11:38 08:40 


 


WBC     (4.8-10.8)  x10^3/uL


 


RBC     (4.70-6.10)  10^6/uL


 


Hgb     (14.0-18.0)  g/dL


 


Hct     (42.0-52.0)  %


 


MCV     (80.0-94.0)  fL


 


MCH     (27.0-31.0)  pg


 


MCHC     (32.0-36.0)  g/dL


 


RDW     (12.0-15.0)  %


 


Plt Count     (130-450)  10^3/uL


 


MPV     (7.4-11.4)  fL


 


Neut #     (1.5-6.6)  10^3/uL


 


Lymph #     (1.5-3.5)  10^3/uL


 


Mono #     (0.0-1.0)  10^3/uL


 


Eos #     (0.0-0.7)  10^3/uL


 


Baso #     (0.0-0.1)  10^3/uL


 


Absolute Nucleated RBC     x10^3/uL


 


Nucleated RBCs     /100WBC


 


Sodium     (135-145)  mmol/L


 


Potassium     (3.5-5.0)  mmol/L


 


Chloride     (101-111)  mmol/L


 


Carbon Dioxide     (21-32)  mmol/L


 


Anion Gap     (6-13)  


 


BUN     (6-20)  mg/dL


 


Creatinine     (0.6-1.2)  mg/dL


 


Estimated GFR (MDRD)     (>89)  


 


Glucose     ()  mg/dL


 


POC Whole Bld Glucose  83  135 H  126 H  (70 - 100)  mg/dL


 


Lactic Acid     (0.5-2.2)  mmol/L


 


Calcium     (8.5-10.3)  mg/dL


 


Ionized Calcium     


 


Phosphorus     (2.5-4.6)  mg/dL


 


Magnesium     (1.7-2.8)  mg/dL


 


Total Bilirubin     (0.2-1.0)  mg/dL


 


AST     (10-42)  IU/L


 


ALT     (10-60)  IU/L


 


Alkaline Phosphatase     ()  IU/L


 


Total Protein     (6.7-8.2)  g/dL


 


Albumin     (3.2-5.5)  g/dL


 


Globulin     (2.1-4.2)  g/dL


 


Albumin/Globulin Ratio     (1.0-2.2)  














  09/18/17 09/18/17 09/18/17 Range/Units





  21:12 17:08 11:42 


 


WBC     (4.8-10.8)  x10^3/uL


 


RBC     (4.70-6.10)  10^6/uL


 


Hgb     (14.0-18.0)  g/dL


 


Hct     (42.0-52.0)  %


 


MCV     (80.0-94.0)  fL


 


MCH     (27.0-31.0)  pg


 


MCHC     (32.0-36.0)  g/dL


 


RDW     (12.0-15.0)  %


 


Plt Count     (130-450)  10^3/uL


 


MPV     (7.4-11.4)  fL


 


Neut #     (1.5-6.6)  10^3/uL


 


Lymph #     (1.5-3.5)  10^3/uL


 


Mono #     (0.0-1.0)  10^3/uL


 


Eos #     (0.0-0.7)  10^3/uL


 


Baso #     (0.0-0.1)  10^3/uL


 


Absolute Nucleated RBC     x10^3/uL


 


Nucleated RBCs     /100WBC


 


Sodium     (135-145)  mmol/L


 


Potassium     (3.5-5.0)  mmol/L


 


Chloride     (101-111)  mmol/L


 


Carbon Dioxide     (21-32)  mmol/L


 


Anion Gap     (6-13)  


 


BUN     (6-20)  mg/dL


 


Creatinine     (0.6-1.2)  mg/dL


 


Estimated GFR (MDRD)     (>89)  


 


Glucose     ()  mg/dL


 


POC Whole Bld Glucose  151 H  109 H  150 H  (70 - 100)  mg/dL


 


Lactic Acid     (0.5-2.2)  mmol/L


 


Calcium     (8.5-10.3)  mg/dL


 


Ionized Calcium     


 


Phosphorus     (2.5-4.6)  mg/dL


 


Magnesium     (1.7-2.8)  mg/dL


 


Total Bilirubin     (0.2-1.0)  mg/dL


 


AST     (10-42)  IU/L


 


ALT     (10-60)  IU/L


 


Alkaline Phosphatase     ()  IU/L


 


Total Protein     (6.7-8.2)  g/dL


 


Albumin     (3.2-5.5)  g/dL


 


Globulin     (2.1-4.2)  g/dL


 


Albumin/Globulin Ratio     (1.0-2.2)  














  09/18/17 Range/Units





  08:01 


 


WBC   (4.8-10.8)  x10^3/uL


 


RBC   (4.70-6.10)  10^6/uL


 


Hgb   (14.0-18.0)  g/dL


 


Hct   (42.0-52.0)  %


 


MCV   (80.0-94.0)  fL


 


MCH   (27.0-31.0)  pg


 


MCHC   (32.0-36.0)  g/dL


 


RDW   (12.0-15.0)  %


 


Plt Count   (130-450)  10^3/uL


 


MPV   (7.4-11.4)  fL


 


Neut #   (1.5-6.6)  10^3/uL


 


Lymph #   (1.5-3.5)  10^3/uL


 


Mono #   (0.0-1.0)  10^3/uL


 


Eos #   (0.0-0.7)  10^3/uL


 


Baso #   (0.0-0.1)  10^3/uL


 


Absolute Nucleated RBC   x10^3/uL


 


Nucleated RBCs   /100WBC


 


Sodium   (135-145)  mmol/L


 


Potassium   (3.5-5.0)  mmol/L


 


Chloride   (101-111)  mmol/L


 


Carbon Dioxide   (21-32)  mmol/L


 


Anion Gap   (6-13)  


 


BUN   (6-20)  mg/dL


 


Creatinine   (0.6-1.2)  mg/dL


 


Estimated GFR (MDRD)   (>89)  


 


Glucose   ()  mg/dL


 


POC Whole Bld Glucose  145 H  (70 - 100)  mg/dL


 


Lactic Acid   (0.5-2.2)  mmol/L


 


Calcium   (8.5-10.3)  mg/dL


 


Ionized Calcium   


 


Phosphorus   (2.5-4.6)  mg/dL


 


Magnesium   (1.7-2.8)  mg/dL


 


Total Bilirubin   (0.2-1.0)  mg/dL


 


AST   (10-42)  IU/L


 


ALT   (10-60)  IU/L


 


Alkaline Phosphatase   ()  IU/L


 


Total Protein   (6.7-8.2)  g/dL


 


Albumin   (3.2-5.5)  g/dL


 


Globulin   (2.1-4.2)  g/dL


 


Albumin/Globulin Ratio   (1.0-2.2)  














Assessment/Plan





- Problem List


(1) Severe sepsis with septic shock


Impression: 


As stated in history of present illness, the patient received aggressive IV 

fluid resuscitation.  Levophed and a transfer to ICU.  This is in the face of 

an elderly gentleman who has had multiple admissions, is a DO NOT RESUSCITATE, 

and has been gently and steadily failing over the last 1-2 years.





His current episode of sepsis with shock is resolving today.  Will stop IV 

fluids and IV lock for now. I/O negative but he is drinking.  Stopped the 

levophed 9/19.    His power of  still wants the patient treated for as 

long as his quality of life is determined to be acceptable.  In between 

episodes of infection and hospitalization he goes back to being a forgetful, 

charming, nati person who enjoys his Oreo cookies.  He had his Oreo cookies 

the day before admission.





peripheral blood culture neg


central line blood culture neg


urine C&S with <34162 urogenital dar


C dif negative


MRSA nares positive


WBC is stil 28K





Day#3 Imipenem, flagyl, diflucan








(2) Multisystem organ failure


Impression: 


With acute renal failure, elevated bilirubin, lactic acidosis, hypotension.  

All have improved to the point BP normal today.   We will continue with IV 

antibiotics.  See #1





Bili has been elevated a year ago but was only up to 1.8. Has multiple stones 

on CT abd but no acute cholecystitis in the past. currently without abd pain in 

RUQ


does have bilateal kidney stones but no current flank pain per se. again, hurts 

all over.





lactic acid 0.7 today. no change in abx needed








(3) Acute metabolic encephalopathy


Impression: 


Improved.  He is speaking complete sentences, able to express his emotions.  9/ 18 he was very very lethargic.  seems to be at baseline personality.





(4) History of chronic urinary tract infection


Impression: 


At this time this is the presumed source of his sepsis and shock.  He has a 

long history of urinary retention, requiring a chronic indwelling Aldrich, and 

multiple multiple bacteria with multiple drug resistance.  He is currently 

responding to the current drug regime.  No change at this time.  We will make 

sure Aldrich is changed out.





Blood cultures negative so far, chest x-ray negative for pneumonia








(5) Senescence


Impression: 


With dementia.  Currently living at a skilled nursing facility since February 

of this year.  Power of /close friends, are his advocates.  He has had 

a palliative care consult with Talita Viera and continues to be seen by her.  

Yesterday and today's notes appreciated.





Goal is to return to skilled nursing facility after this acute episode of care.

  He will continue to be readmitted as needed until his quality of life has 

deteriorated to the point that his advocates feel that comfort measures are 

required.








(6) DM type 2, uncontrolled, with neuropathy


Impression: 


not ususally on meds at SNF. Here will be on SS after ac qid checks.

## 2017-09-21 VITALS — SYSTOLIC BLOOD PRESSURE: 148 MMHG | DIASTOLIC BLOOD PRESSURE: 70 MMHG

## 2017-09-21 LAB
ALBUMIN/GLOB SERPL: 0.9 {RATIO} (ref 1–2.2)
ANION GAP SERPL CALCULATED.4IONS-SCNC: 11 MMOL/L (ref 6–13)
BASOPHILS NFR BLD AUTO: 0 10^3/UL (ref 0–0.1)
BASOPHILS NFR BLD AUTO: 0.5 %
BILIRUB BLD-MCNC: 1.6 MG/DL (ref 0.2–1)
BUN SERPL-MCNC: 12 MG/DL (ref 6–20)
CALCIUM UR-MCNC: 8.8 MG/DL (ref 8.5–10.3)
CHLORIDE SERPL-SCNC: 100 MMOL/L (ref 101–111)
CO2 SERPL-SCNC: 27 MMOL/L (ref 21–32)
CREAT SERPLBLD-SCNC: 0.7 MG/DL (ref 0.6–1.2)
EOSINOPHIL # BLD AUTO: 0.4 10^3/UL (ref 0–0.7)
EOSINOPHIL NFR BLD AUTO: 5.1 %
ERYTHROCYTE [DISTWIDTH] IN BLOOD BY AUTOMATED COUNT: 13.4 % (ref 12–15)
GFRSERPLBLD MDRD-ARVRAT: 108 ML/MIN/{1.73_M2} (ref 89–?)
GLOBULIN SER-MCNC: 3 G/DL (ref 2.1–4.2)
GLUCOSE SERPL-MCNC: 108 MG/DL (ref 70–100)
HCT VFR BLD AUTO: 33.1 % (ref 42–52)
HGB UR QL STRIP: 11 G/DL (ref 14–18)
LYMPHOCYTES # SPEC AUTO: 1.9 10^3/UL (ref 1.5–3.5)
LYMPHOCYTES NFR BLD AUTO: 24.3 %
MAGNESIUM SERPL-MCNC: 1.3 MG/DL (ref 1.7–2.8)
MCH RBC QN AUTO: 31.2 PG (ref 27–31)
MCHC RBC AUTO-ENTMCNC: 33.2 G/DL (ref 32–36)
MCV RBC AUTO: 94.1 FL (ref 80–94)
MONOCYTES # BLD AUTO: 0.5 10^3/UL (ref 0–1)
MONOCYTES NFR BLD AUTO: 6.8 %
NEUTROPHILS # BLD AUTO: 5 10^3/UL (ref 1.5–6.6)
NEUTROPHILS # SNV AUTO: 7.9 X10^3/UL (ref 4.8–10.8)
NEUTROPHILS NFR BLD AUTO: 63.3 %
NRBC # BLD AUTO: 0 /100WBC
PDW BLD AUTO: 8.1 FL (ref 7.4–11.4)
PHOSPHATE BLD-MCNC: 3.7 MG/DL (ref 2.5–4.6)
POTASSIUM SERPL-SCNC: 3.3 MMOL/L (ref 3.5–5)
PROT SPEC-MCNC: 5.6 G/DL (ref 6.7–8.2)
RBC MAR: 3.52 10^6/UL (ref 4.7–6.1)
SODIUM SERPLBLD-SCNC: 138 MMOL/L (ref 135–145)
WBC # BLD: 7.9 X10^3/UL

## 2017-09-21 RX ADMIN — METRONIDAZOLE SCH MLS/HR: 500 INJECTION, SOLUTION INTRAVENOUS at 13:14

## 2017-09-21 RX ADMIN — INSULIN ASPART SCH UNIT: 100 INJECTION, SOLUTION INTRAVENOUS; SUBCUTANEOUS at 11:23

## 2017-09-21 RX ADMIN — HEPARIN SODIUM SCH UNIT: 5000 INJECTION, SOLUTION INTRAVENOUS; SUBCUTANEOUS at 09:09

## 2017-09-21 RX ADMIN — SODIUM CHLORIDE, PRESERVATIVE FREE PRN ML: 5 INJECTION INTRAVENOUS at 08:13

## 2017-09-21 RX ADMIN — IMIPENEM AND CILASTATIN SODIUM SCH MLS/HR: 500; 500 INJECTION, POWDER, FOR SOLUTION INTRAVENOUS at 02:32

## 2017-09-21 RX ADMIN — INSULIN ASPART SCH: 100 INJECTION, SOLUTION INTRAVENOUS; SUBCUTANEOUS at 08:21

## 2017-09-21 RX ADMIN — BUDESONIDE SCH MG: 0.5 SUSPENSION RESPIRATORY (INHALATION) at 07:26

## 2017-09-21 RX ADMIN — FORMOTEROL FUMARATE DIHYDRATE SCH MCG: 20 SOLUTION RESPIRATORY (INHALATION) at 07:28

## 2017-09-21 RX ADMIN — SODIUM CHLORIDE, PRESERVATIVE FREE SCH ML: 5 INJECTION INTRAVENOUS at 01:17

## 2017-09-21 RX ADMIN — SODIUM CHLORIDE, PRESERVATIVE FREE SCH: 5 INJECTION INTRAVENOUS at 14:55

## 2017-09-21 RX ADMIN — PANTOPRAZOLE SODIUM SCH MG: 40 TABLET, DELAYED RELEASE ORAL at 07:00

## 2017-09-21 RX ADMIN — METRONIDAZOLE SCH MLS/HR: 500 INJECTION, SOLUTION INTRAVENOUS at 08:13

## 2017-09-21 RX ADMIN — FLUCONAZOLE SCH MLS/HR: 2 INJECTION, SOLUTION INTRAVENOUS at 09:29

## 2017-09-21 RX ADMIN — SODIUM CHLORIDE, PRESERVATIVE FREE SCH ML: 5 INJECTION INTRAVENOUS at 05:11

## 2017-09-21 RX ADMIN — METRONIDAZOLE SCH MLS/HR: 500 INJECTION, SOLUTION INTRAVENOUS at 01:16

## 2017-09-21 RX ADMIN — IMIPENEM AND CILASTATIN SODIUM SCH MLS/HR: 500; 500 INJECTION, POWDER, FOR SOLUTION INTRAVENOUS at 11:18

## 2017-09-21 RX ADMIN — SODIUM CHLORIDE, PRESERVATIVE FREE PRN ML: 5 INJECTION INTRAVENOUS at 05:11

## 2017-09-26 ENCOUNTER — HOSPITAL ENCOUNTER (OUTPATIENT)
Dept: HOSPITAL 76 - PC | Age: 82
Discharge: HOME | End: 2017-09-26
Attending: NURSE PRACTITIONER
Payer: MEDICARE

## 2017-09-26 DIAGNOSIS — Z51.5: Primary | ICD-10-CM

## 2017-09-26 DIAGNOSIS — Z96.0: ICD-10-CM

## 2017-09-26 DIAGNOSIS — M79.605: ICD-10-CM

## 2017-09-26 DIAGNOSIS — E11.9: ICD-10-CM

## 2017-09-26 DIAGNOSIS — R33.9: ICD-10-CM

## 2017-09-26 DIAGNOSIS — Z66: ICD-10-CM

## 2017-09-26 DIAGNOSIS — G89.29: ICD-10-CM

## 2017-09-26 DIAGNOSIS — J44.9: ICD-10-CM

## 2017-09-26 DIAGNOSIS — M62.81: ICD-10-CM

## 2017-09-26 PROCEDURE — 99309 SBSQ NF CARE MODERATE MDM 30: CPT

## 2017-09-26 NOTE — CONSULTATION NOTE
Palliative Care Follow Up





- Referral


Referring Provider: Dr Jose Orellana


Time of Visit: 11:00


[facility]





- Information Sources


Records Reviewed: RN notes reviewed, Old records reviewed


History obtained from: Patient, Friend, Caregiver


Exam limitations: Clinical condition (Dementia, severely impaired short-term 

memory)





- History of Present Illness Update


Brief HPI Update: 





This is a charming 83-year-old gentleman with severe end-stage COPD and chronic 

urinary retention with indwelling Gupta catheter. He has a history of recurrent 

infections and hospitalizations. This history is complicated by an increasing 

resistance to multiple drugs, as indicated by his cultures. He was hospitalized 

in July and again last week for two days, presumably for urosepsis.





He has been steadily and slowly declining for the past 1-2 years, but after 

each hospitalization, he rebounds and enjoys his life again. The goals of his 

DPOA, consistent with the patient's wishes, are to continue to re-hospitalize 

him as needed to maintain his quality of life; when his quality of life 

deteriorates to the point that the benefits outweigh the burdens of 

interventions, then transition him to comfort care/Hospice. 





He was discharged from hospital five days ago, and nursing reports one day 

since discharge where he was not doing very well. However, since then, nursing 

says he has been improving. Today he was up at breakfast, mobile in his 

wheelchair and participating in therapy.





He recognizes me and is pleasant and charming as always. He is unable to 

remember being hospitalized, and denies all complaints when I question him. He 

does report some pain in his left leg and also does not like wearing his wool 

sweater as it scratches him.  He was able to sit up on the side of the bed, 

with my help, and remained upright for several minutes while I examine him. 





He is an unreliable historian and is not able to tell me much about his 

condition. He denies coughing, SOB, and chest pain, but admits being tired, 

though not unusually so. He is consistent in stating that he wants to continue 

to go to the hospital if he needs to do so to "get better."











Social History





- Living Situation


Living arrangement: Nursing home ([facility])


Living Situation: With caregiver(s)


Support System: 





DPOAs Hollis Mandujano and Marion ("Onee") Terri live close by, are his friends 

and advocates. He is popular and well liked by staff at the facility.








Medications/Allergies





- Medications


Home Medications: 


 Ambulatory Orders











 Medication  Instructions  Recorded  Confirmed


 


Acetaminophen 325 mg PO Q4H PRN 11/26/16 09/26/17


 


Fluticasone/Salmeterol [Advair 1 inh INH BID 11/26/16 09/26/17





100-50 Diskus]   


 


Clopidogrel [Plavix] 75 mg PO DAILY 30 Days  tablet 01/18/17 09/26/17


 


Gabapentin [Neurontin] 100 mg PO TID 07/22/17 09/26/17


 


Ipratropium/Albuterol [Duoneb] 3 ml INH Q6H PRN 07/22/17 09/26/17


 


Lorazepam 0.25 mg PO Q6H PRN 07/22/17 09/26/17


 


Mirtazapine [Remeron] 15 mg PO QPM 07/22/17 09/26/17


 


Tamsulosin HCl [Flomax] 0.4 mg PO QPM 07/22/17 09/26/17


 


Latanoprost 0.005% Ophth Drops 1 drops EACHEYE QPM #1 bottle 07/25/17 09/26/17





[Xalatan Ophth Drops]   


 


Calcium Carbonate [Tums (Calcium 500 mg PO Q8HR PRN #30 tablet 07/27/17 09/26/17





Carbonate 500mg)]   


 


Cholecalciferol [Vitamin D3] 5,000 unit PO BID #30 capsule 07/27/17 09/26/17


 


Bisacodyl Supp [Dulcolax Supp] 10 mg NH DAILY PRN 09/26/17 09/26/17


 


Senna [Senokot] 1 - 2 tab PO DAILY PRN 09/26/17 09/26/17














- Allergies


Allergies/Adverse Reactions: 


 Allergies











Allergy/AdvReac Type Severity Reaction Status Date / Time


 


No Known Drug Allergies Allergy   Verified 07/22/17 13:07














Review of Systems





- Constitutional


Constitutional: reports: Fatigue, Weakness.  denies: Fever, Chills





- Cardiovascular


Cardiovascular: reports: Decr. exercise tolerance.  denies: Chest pain





- Respiratory


Respiratory: denies: Cough, SOB at rest





- Gastrointestinal


Gastrointestinal: denies: Constipation, Nausea, Vomiting





- Musculoskeletal


Musculoskeletal: reports: Other (L leg pain)





- Neurological


Neurological: reports: General weakness, Memory problems





- Psychiatric


Psychiatric: denies: Depression





- Endocrine


Endocrine: reports: Diabetes type 2 (Not on medications. BGs taken every other 

day.)





Physical Exam





- Vital Signs


Temperature: 98.4 F


Pulse Rate: 83


O2 Saturation: 95 (on 2L O2)


Blood Pressure: 117/62





- Physical Exam


General Appearance: positive: No acute distress, Alert


Eyes Bilateral: positive: EOMI, No lid inflammation, Conjunctivae nml, No 

scleral icterus


Neck: positive: Trachea midline, Stiff neck


Cardiovascular: positive: Regular rate & rhythm, Systolic murmur


Respiratory: positive: No respiratory distress, Diminished in bases (Right), 

Rales (crackles R side.)


Skin: positive: Dryness (flaky, reddened skin on cheeks, forehead)


Neurologic/Psychiatric: positive: Mood/affect nml, Disoriented to place, 

Disoriented to time, Weakness





Palliative Care





- POLST


Patient has POLST: Yes


POLST Status: DNR, Limited Interventions


Pain: Location (Left leg.)


Tiredness/Fatigue: Mild (1-3)


Drowsiness/Sedation: None


Nausea: None


Depression: None


Anxiety: None


Dyspnea: None


Anorexia: None


Sleep: Sleeps well


Constipation: No


Feelings of wellbeing/Perceived Quality of Life: Good


Performance Status: 





Current level of functioning:  Improvement since discharge 5 days ago from 

hospital, but overall he is slowly, steadily declining with repeated infections 

and hospitalizations. He has chronic urinary retention and indwelling Gupta 

catheter. He requires help with all ADLs, ambulates in wheelchair, currently 

participates in therapy, and his short term memory is significantly impaired.





Palliative Care Performance Status:  50%








- Other Findings/Comments


Additional Discussion: 





Who is present:  Patient and myself.  I spoke with one of his nurses and 

telephoned KYLE Shirley, after the visit.





Surrogate decision maker:  Co-DPOAs: Hollis Mandujano [phone] and mobile 

[phone].  Carl ("Casper") Terri mobile [phone] or [phone].








Most important goals:  Casper, his advocate and co-DPOA wants to continue to 

support him in maintaining his quality of life. As long as he is comfortable 

and is enjoying his quality of life, she wants to continue to medically 

intervene (hospitalize) as needed. The patient says he does too. When quality 

of life no longer justifies further hospitalization and treatment, transition 

to Hospice/comfort care at that time.








Impression and Recommendations





- Palliative Care


Impression: 





This is a charming 80-year-old gentleman with end-stage COPD and chronic 

urinary retention, a history of repetitive urinary tract infections with 

multiple drug resistances and multiple hospitalizations. He continues to be at 

high risk for future infections, but both he and his advocates want to continue 

to treat him as long as his quality of life outweighs the burdens of 

hospitalization. When it no longer does, that will be the time to transition to 

comfort care.





Recommendations/Counseling Done: 





1.   End-stage COPD: Continue O2 via nasal cannula, Duonebs, and Advair Diskus.


 


2.   Diabetes: Insulin and other medications have been DCd. Checking BGs every 

other day. Fasting numbers range from 136-175.





3.   Chronic pain: Tylenol as needed.


  


4.   Advanced care planning: POLST in place: with limited intervention. DPOA 

advocates for patient to continue with hospitalizations as indicated, as long 

as quality of life and his ability to rebound post-hospitalization justify it. 

When quality of life deteriorates, then transition him to comfort care/Hospice.





Time Spent: 





30 minutes with greater than 50% of this done in coordination of care with 

nursing,  counseling, and symptom management, as well as goals of care with 

DPOA.

## 2017-10-05 NOTE — DISCHARGE SUMMARY
DATE OF ADMISSION: 09/18/2017

 

DATE OF DISCHARGE: 09/21/2017

 

PRIMARY CARE PROVIDER: Cal Prasad MD

 

DISCHARGE DIAGNOSES 

1. Septic shock. 

2. Acute renal failure due to circulatory failure. 

3. Elevated bilirubin. 

4. Hypotension. 

5. Acute encephalopathy. 

6. History of chronic urinary tract infection. 

7. Senescence. 

8. Type 2 diabetes mellitus, uncontrolled with complications. 

 

DISCHARGE MEDICATIONS 

1. Tums 500 mg every 8 hours as needed.

2. Acetaminophen 325 mg every 4 hours as needed.

3. Dulcolax 10 mg per rectum daily p.r.n. constipation.

4. Vitamin D 5000 unit capsule weekly.

5. Plavix 75 mg p.o. daily.

6. Advair 250/50 one puff b.i.d.

7. Gabapentin 100 mg p.o. t.i.d.

8. DuoNeb via nebulizer q.6 hours p.r.n.

9. Latanoprost eyedrops 1 drop each eye daily.

10. Lorazepam 0.25 mg every 6 hours as needed.

11. Remeron 15 mg p.o. q.p.m.

12. Flomax 0.4 mg p.o. q.p.m.

13. Antibiotic written for and nursing home orders not scanned yet to verify which antibiotic for the
 next 4 days. 

 

PRINCIPAL PROCEDURES 

1. Blood cultures negative after 5 days.

2. Urine culture showing less than 10,000 colonies of urogenital dar.

3. MRSA negative.

4. C difficile negative.

5. Chest x-ray negative for new infiltrate. 

 

HOSPITAL COURSE: The patient was admitted by hospitalist Carlotta Chapin, and her history and physical
 is referred to. He is a chronically ill gentleman who lives in a skilled nursing facility. He has be
en admitted multiple times with urosepsis attributed to a chronic indwelling Gupta. He has grown out 
multiple organisms including Enterobacter, Staphylococcus, and Enterococcus. His last stay was in Jul
y 2017. He was brought to the emergency room on 09/17/2017 with acute encephalopathy, manifested as i
ncreasing lethargy and obtundation. He was found to be tachycardic, temperature 37.5, and hypotensive
. Workup to find out source of infection with a white cell count of 28,000 and a lactic acid of 4.4 a
nd a creatinine of 2, showed him to have only another UTI. He had pulmonary vascular congestion, but 
no acute infiltrate. Glucose was 200. Troponin was negative.

 

The patient was placed in ICU and started on empiric IV antibiotic therapy to treat most likely urina
ry pathogen. He was started on Levophed. While he has a gradually diminishing quality of life and has
 worsened over the last 1-2 years due to senescence and his infections, his velásquez of  feel t
hat the patient bounces back to an acceptable quality of life in between acute care episodes. As such
, while he is DO NOT RESUSCITATE, they still want aggressive measures done to treat this patient's il
lness. He responded quickly to the Levophed, IV fluids and IV antibiotics, and was transitioned to me
d/surg unit. The patient gradually recovered with his encephalopathy and it was resolved by discharge
. He was back to being at baseline. He is an exceedingly charming, affectionate elderly gentleman. He
 has really no good memory, but is completely appropriate and socially interactive, a delight to take
 care of.

 

He was felt to have multisystem organ failure with gradual improvement of labs to almost normal. Whit
e cell count came down. Cultures were as above.

 

The patient was able to eat, and he is nonambulatory and as such, Physical Therapy did not see him. S
wallowing evaluation was done and he has a slow weak swallow. He accumulates food in the pharynx and 
enters the laryngeal vestibule if he eats too fast or takes large bites. Eating was very fatiguing fo
r him and his intake was poor. Dentures were loose and did not serve him well. He will have better in
take and a moist, pureed diet. He should have aspiration precautions. He eats a dysphagia mechanical 
soft diet at [facility].

 

He was discharged in stable condition. Overall poor prognosis because of his worsening senescence. On
 the day of discharge, temperature was 36.9, pulse was 80, blood pressure 148/70, 95% on room air. He
 was a slightly hoarse voiced elderly gentleman with bilateral temporal wasting. Constant clearing of
 his throat when he talked. Lungs with intermittent rhonchi that cleared with deep cough. Regular rat
e and rhythm. The abdomen was soft, nontender, not distended with normal bowel sounds. Had a chronic 
indwelling Gupta that was just changed the day before admission.

 

His encephalopathy was gone. His hypotension was resolved. Elevated bilirubin of 4.3 on admission was
 now 1.6. Glucose remained controlled between 106-144 during this admission. Creatinine was initially
 2, and down to 0.7 at discharge.

 

Greater than 30 minutes was spent coordinating discharge for this nati elderly gentleman.

 

 

 

DD:10/05/2017 01:38:00  DT: 10/05/2017 06:56  JOB #: [id number]  EXT JOB #:[id number]

## 2017-11-09 ENCOUNTER — HOSPITAL ENCOUNTER (OUTPATIENT)
Dept: HOSPITAL 76 - PC | Age: 82
Discharge: HOME | End: 2017-11-09
Attending: NURSE PRACTITIONER
Payer: MEDICARE

## 2017-11-09 DIAGNOSIS — Z66: ICD-10-CM

## 2017-11-09 DIAGNOSIS — J44.9: ICD-10-CM

## 2017-11-09 DIAGNOSIS — G89.29: ICD-10-CM

## 2017-11-09 DIAGNOSIS — Z96.0: ICD-10-CM

## 2017-11-09 DIAGNOSIS — Z51.5: Primary | ICD-10-CM

## 2017-11-09 DIAGNOSIS — R33.9: ICD-10-CM

## 2017-11-09 DIAGNOSIS — Z79.51: ICD-10-CM

## 2017-11-09 DIAGNOSIS — Z87.01: ICD-10-CM

## 2017-11-09 PROCEDURE — 99308 SBSQ NF CARE LOW MDM 20: CPT

## 2017-11-09 NOTE — CONSULTATION NOTE
Palliative Care Follow Up





- Referral


Referring Provider: Dr Rodriguez


Time of Visit: 16:30


Referral setting: Skilled Nursing Facility ([facility])





- Information Sources


Records reviewed: Previous records reviewed


History/Review of Systems obtained from: Patient, Nursing


Exam limitations: Clinical condition (Dementia)





- History of Present Illness Update


Brief HPI Update: 





This is a charming 83-year-old gentleman with dementia, severe end-stage COPD, 

and chronic urinary retention with indwelling Gupta catheter. He has a history 

of recurrent infections and hospitalizations. this history is complicated by an 

increasing resistance to multiple drugs, as indicated by his cultures. 





Today the  reported patient thought he was getting the flu. Patient's 

vital signs are stable, he denies cough, headache, chest pain, night sweats, 

sore throat, fevers, N/V. He does report chills off and on.  His breath sounds 

are clar without crackles, and with diminished breath sounds on R side. He has 

no s/s of respiratory distress.  Nursing reports no flu-like symptoms and he 

was up and playing chess today.





He is calm, awake, responsive, and pleasant today, without signs of distress or 

illness. His water pitcher at bedside was empty and I instructed nursing to 

monitor, keep it filled, and encourage adequate intake of fluids.   








Social History





- Living Situation


Living arrangement: Nursing home ([facility])


Living Situation: With caregiver(s)


Support System: 





Kika Mandujano and Marion "Casper" Terri live close by and are his friends 

and advocates. He is popular and well-liked by staff at the facility.








Medications/Allergies





- Medications


Home Medications: 


 Ambulatory Orders











 Medication  Instructions  Recorded  Confirmed


 


Acetaminophen 325 mg PO Q4H PRN 11/26/16 09/26/17


 


Fluticasone/Salmeterol [Advair 1 inh INH BID 11/26/16 09/26/17





100-50 Diskus]   


 


Clopidogrel [Plavix] 75 mg PO DAILY 30 Days  tablet 01/18/17 09/26/17


 


Gabapentin [Neurontin] 100 mg PO TID 07/22/17 09/26/17


 


Ipratropium/Albuterol [Duoneb] 3 ml INH Q6H PRN 07/22/17 09/26/17


 


LORazepam [Lorazepam] 0.25 mg PO Q6H PRN 07/22/17 09/26/17


 


Mirtazapine [Remeron] 15 mg PO QPM 07/22/17 09/26/17


 


Tamsulosin HCl [Flomax] 0.4 mg PO QPM 07/22/17 09/26/17


 


Latanoprost 0.005% Ophth Drops 1 drops EACHEYE QPM #1 bottle 07/25/17 09/26/17





[Xalatan Ophth Drops]   


 


Calcium Carbonate [Tums (Calcium 500 mg PO Q8HR PRN #30 tablet 07/27/17 09/26/17





Carbonate 500mg)]   


 


Cholecalciferol [Vitamin D3] 5,000 unit PO BID #30 capsule 07/27/17 09/26/17


 


Bisacodyl Supp [Dulcolax Supp] 10 mg [place] DAILY PRN 09/26/17 09/26/17


 


Senna [Senokot] 1 - 2 tab PO DAILY PRN 09/26/17 09/26/17














- Allergies


Allergies/Adverse Reactions: 


 Allergies











Allergy/AdvReac Type Severity Reaction Status Date / Time


 


No Known Drug Allergies Allergy   Verified 07/22/17 13:07














Review of Systems





- Constitutional


Constitutional: reports: Chills (off and on).  denies: Fever, Weakness, 

Diaphoresis, Night sweats





- Ears, Nose & Throat


Ears, Nose & Throat: reports: Hearing loss.  denies: Nasal discharge, Nasal 

congestion, Postnasal drainage, Sore throat, Mouth lesions





- Cardiovascular


Cardiovascular: denies: Irregular heart rate, Palpitations, Chest pain





- Respiratory


Respiratory: reports: Sputum production.  denies: Cough, Wheezing





- Genitourinary


Genitourinary: denies: Dysuria, Frequency, Urgency





- Neurological


Neurological: denies: Headache





- Psychiatric


Psychiatric: denies: Depression, Anxiety





Physical Exam





- Vital Signs


Temperature: 98.6 F


Pulse Rate: 85


O2 Saturation: 95


Blood Pressure: 108/50





- Physical Exam


General Appearance: positive: No acute distress, Alert


Eyes Bilateral: positive: EOMI, No lid inflammation, Conjunctivae nml, No 

scleral icterus


ENT: positive: Pharynx nml.  negative: Purulent nasal drainage


Cardiovascular: positive: Regular rate & rhythm, No murmur, No gallop


Respiratory: positive: Chest non-tender, No respiratory distress, Breath sounds 

nml, Diminished in bases (right side).  negative: Rales, Rhonchi


Skin: positive: Dryness


Extremities: positive: Non-tender, No pedal edema


Neurologic/Psychiatric: positive: Sensation nml, Mood/affect nml, Disoriented 

to time.  negative: Disoriented to person, Disoriented to place





Palliative Care





- POLST


Patient has POLST: Yes


POLST Status: DNR, Limited Interventions


Pain: No pain


Tiredness/Fatigue: None


Drowsiness/Sedation: None


Nausea: None


Depression: None


Anxiety: None


Dyspnea: Mild (1-3)


Anorexia: None


Sleep: Sleeps well


Constipation: No


Feelings of wellbeing/Perceived Quality of Life: Acceptable





- Palliative Care


Discussion: 





Goal is always to support him in maintaining his quality of life, and as long 

as he is enjoying his quality life, the DPOA will continue to hospitalize as 

needed. Transfer to Hospice will be appropriate when quality of life no longer 

justifies further hospitalization and treatment.








Impression and Recommendations





- Palliative Care


Impression: 





This is a charming 83-year-old gentleman with dementia, end-stage COPD and 

chronic urinary retention and a history of repetitive pneumonia and 

hospitalizations. He and his DPOA want to continue to hospitalize as needed, as 

long as his quality and enjoyment of life justify it. When that is no longer 

the case, then transitioning to Hospice will be appropriate. Today he displays 

no signs of respiratory infection or flu, but given his history of multiple 

respiratory crises and hospitalizations, he needs close monitoring.





Recommendations/Counseling Done: 





End stage COPD:  No sign of respiratory distress or respiratory infection. 

Monitor closely for changes in mentation and functionality and increase in 

malaise. Encourage fluid intake.





Chronic pain: stable, no complaints today. Continue Tylenol as needed.





Advanced care planning:  OK to hospitalize if indicated assuming his quality 

and enjoyment of life continue to justify it, and he continues to rebound after 

being in the hospital. If that becomes no longer the case, then transitioning 

to Hospice will be appropriate.





Time Spent: 





15 minutes with greater than 50% of this in evaluation of respiratory status 

and counseling and coordination of care.

## 2017-11-10 ENCOUNTER — HOSPITAL ENCOUNTER (OUTPATIENT)
Dept: HOSPITAL 76 - PC | Age: 82
Discharge: HOME | End: 2017-11-10
Attending: NURSE PRACTITIONER
Payer: MEDICARE

## 2017-11-10 DIAGNOSIS — J44.0: ICD-10-CM

## 2017-11-10 DIAGNOSIS — Z87.01: ICD-10-CM

## 2017-11-10 DIAGNOSIS — Z66: ICD-10-CM

## 2017-11-10 DIAGNOSIS — R53.81: ICD-10-CM

## 2017-11-10 DIAGNOSIS — F03.90: ICD-10-CM

## 2017-11-10 DIAGNOSIS — R33.9: ICD-10-CM

## 2017-11-10 DIAGNOSIS — Z51.5: Primary | ICD-10-CM

## 2017-11-10 DIAGNOSIS — J22: ICD-10-CM

## 2017-11-10 DIAGNOSIS — N39.0: ICD-10-CM

## 2017-11-10 DIAGNOSIS — R05: ICD-10-CM

## 2017-11-10 DIAGNOSIS — Z79.51: ICD-10-CM

## 2017-11-10 DIAGNOSIS — Z96.0: ICD-10-CM

## 2017-11-10 PROCEDURE — 99309 SBSQ NF CARE MODERATE MDM 30: CPT

## 2017-11-10 NOTE — CONSULTATION NOTE
Palliative Care Follow Up





- Referral


Referring Provider: Dr. Rodriguez


Time of Visit: 15:15


Referral setting: Skilled Nursing Facility ([facility])





- Information Sources


Records reviewed: RN notes reviewed, Previous records reviewed


History/Review of Systems obtained from: Patient, Nursing


Exam limitations: Clinical condition (Dementia; patient is a poor historian)





- History of Present Illness Update


Brief HPI Update: 





This is a charming 83-year-old gentleman with dementia, severe end-stage COPD, 

and chronic urniary retention with indwelling Gupta catheter. He has a history 

of recurrent infections and hospitalizations. This history is complicated by an 

increasing resistance to multiple antibiotics, as indicated by his cultures.





Yesterday the  reported that the patient said he was coming down with a 

flu. I assessed him and his vitals were normal, he had no complaint of cough, 

headache, or other symptoms though he did report chills.  He is a poor historian

, unable to articulate any specific issues or symptoms, but he appeared stable, 

and I asked nursing to monitor his condition closely.





Today they reported by fax that he had productive cough with green sputum. He 

was still unable to express his symptoms but did say that he felt worse after I 

left.  In addition, attempting to sit him up on the side of the bed exhausted 

him and he was too weak to remain upright and had to lie back down immediately





He has a history of repeated pneumonia as well as UTIs resistant to multi-

organisms, and his goals of care, and that of his DPOAs, is to continue to 

treat him as long as he is able to "bounce back," as he has been doing up to 

this point, and able to enjoy his quality of life.





In view of his respiratory fragility, history of repeat infections, and the 

fact that it's Friday and palliative care is not available to monitor him and 

start any treatment over the weekend, I am starting empiric antibiotic 

treatment with the goal of keeping him out of the hospital.  I consulted with 

Dr Carson in ED, and based on his antibiogram and past cultures done in July, 

he recommended 10 days each of oral Bactrim DS bid and IM (or IV) ceftriaxone 

1gm bid.





I discussed this plan and the reasoning behind it with the patient in person 

and with his DPOA, Casper, by telephone, and they were both in agreement.





Just as a side note, there was a plan to include him in the [employer] 

parade this weekend. He does enjoy this sort of celebration, and it is so 

unfortunate he won't be able to be part of it this year.








Social History





- Living Situation


Living arrangement: Nursing home ([facility])


Living Situation: With caregiver(s)


Support System: 





DPOAgerald Mandujano and Marion "Jone murray live close by and are his friends 

and advocates. He is popular and well-liked by staff at the facility.








Medications/Allergies





- Medications


Home Medications: 


 Ambulatory Orders











 Medication  Instructions  Recorded  Confirmed


 


Acetaminophen 325 mg PO Q4H PRN 11/26/16 09/26/17


 


Fluticasone/Salmeterol [Advair 1 inh INH BID 11/26/16 09/26/17





100-50 Diskus]   


 


Clopidogrel [Plavix] 75 mg PO DAILY 30 Days  tablet 01/18/17 09/26/17


 


Gabapentin [Neurontin] 100 mg PO TID 07/22/17 09/26/17


 


Ipratropium/Albuterol [Duoneb] 3 ml INH Q6H PRN 07/22/17 09/26/17


 


LORazepam [Lorazepam] 0.25 mg PO Q6H PRN 07/22/17 09/26/17


 


Mirtazapine [Remeron] 15 mg PO QPM 07/22/17 09/26/17


 


Tamsulosin HCl [Flomax] 0.4 mg PO QPM 07/22/17 09/26/17


 


Latanoprost 0.005% Ophth Drops 1 drops EACHEYE QPM #1 bottle 07/25/17 09/26/17





[Xalatan Ophth Drops]   


 


Calcium Carbonate [Tums (Calcium 500 mg PO Q8HR PRN #30 tablet 07/27/17 09/26/17





Carbonate 500mg)]   


 


Cholecalciferol [Vitamin D3] 5,000 unit PO BID #30 capsule 07/27/17 09/26/17


 


Bisacodyl Supp [Dulcolax Supp] 10 mg [place] DAILY PRN 09/26/17 09/26/17


 


Senna [Senokot] 1 - 2 tab PO DAILY PRN 09/26/17 09/26/17


 


Ceftriaxone 1 g IM BID MDD for 10 days 11/10/17 


 


Guaifenesin [Child Mucinex Chest 10 ml PO Q4H PRN 11/10/17 11/10/17





Congestion]   


 


Lactobacillus Acidophilus/Fos 250 mg PO BID MDD For 21 days 11/10/17 11/10/17





[Acidophilus Probiotic Tablet]   


 


Sulfamethox/Trimeth 800/160 1 tab PO BID MDD for 10 days 11/10/17 11/10/17





[Bactrim Ds]   














- Allergies


Allergies/Adverse Reactions: 


 Allergies











Allergy/AdvReac Type Severity Reaction Status Date / Time


 


No Known Drug Allergies Allergy   Verified 07/22/17 13:07














Review of Systems





- Constitutional


Constitutional: reports: Malaise (Reported that he felt worse after I left 

yesterday, that "everything fell apart" (healthwise). Was unable to be more 

explicit), Weakness





- Cardiovascular


Cardiovascular: denies: Chest pain, Edema





- Respiratory


Respiratory: reports: Cough, Sputum production, Wheezing





- Gastrointestinal


Gastrointestinal: denies: Constipation, Nausea, Vomiting





- Integumentary


Integumentary: reports: Dryness





- Neurological


Neurological: denies: Headache





- Psychiatric


Psychiatric: denies: Depression





- Other Findings


Other Findings: 





Poor historian, unable to obtain a complete or accurate ROS








Physical Exam





- Vital Signs


Temperature: 98.6 F


Pulse Rate: 102


O2 Saturation: 92


Blood Pressure: 120/62





- Physical Exam


General Appearance: positive: No acute distress, Alert


Eyes Bilateral: positive: EOMI, No lid inflammation, Conjunctivae nml, No 

scleral icterus


ENT: positive: No signs of dehydration


Neck: positive: Thyroid nml, No JVD, Trachea midline


Cardiovascular: positive: Regular rate & rhythm, No gallop, Systolic murmur


Respiratory: positive: Chest non-tender, No respiratory distress, Diminished 

throughout, Wheezes (inspiratory).  negative: Rales, Rhonchi


Skin: positive: Dryness


Extremities: positive: No pedal edema


Neurologic/Psychiatric: positive: Disoriented to time, Weakness, Other (

difficulty finding words, expressive aphasia)





Palliative Care





- POLST


Patient has POLST: Yes


POLST Status: DNR, Limited Interventions


Pain: No pain


Tiredness/Fatigue: Mild (1-3)


Drowsiness/Sedation: None


Nausea: None


Depression: None


Anxiety: None


Dyspnea: Mild (1-3)


Anorexia: None





- Palliative Care


Discussion: 





Who is present:  The patient and myself.





Surrogate decision maker:  Co-DPOAs:  Hollis Mandujano [phone] and 

[phone] and mobile [phone].  Carl Murray (Onee) mobile 

[phone]..





Most important goals:  OK to hospitalize to maintain his quality of life and as 

long as he continues to enjoy his life. He and his DPOA are in agreement to 

start antibiotic treatment empirically, in view of his history of repeated 

pneumonia and UTIs and his increasing resistance to multiple antibiotics. The 

goal is to prevent the suspected respiratory infection from progressing to 

pneumonia and hopefully avoid another hospitalization. 








Impression and Recommendations





- Palliative Care


Impression: 





This is a charming 83-year-old gentlean with dementia, end-stage COPD and 

chronic urinary retention and a history of repetitive pneumonia and multiple 

hospitalizations. He is developing a new respiratory infection, and in view of 

his susceptibility to pneumonia, empiric antibiotic treatment may help prevent 

progression to pneumonia and another hospitalization.





Recommendations/Counseling Done: 





Acute respiratory infection on chronic, end-stage COPD:  Worsening symptoms.  

Consulted with Dr Carson in ED and based on the patient's antibiogram and 

history of repetitive pneumonia and UTIs and multiple hospitalizations, started 

empiric Bactrim DS PO BID x 10 days and ceftriaxone 1g IM BID x 10 days, and 

probiotics 250 mg PO BID x 21 days.  Also start guaifenesin 100mg/5mL, 10mL q4h 

prn for cough/sputum production. Goal is to prevent hospitalization for 

pneumonia.  Nursing to take VSs BID x 3 days (through the weekend), encourage 

fluids.





Time Spent: 





30 minutes with greater than 50% of this done in counseling and coordination of 

care regarding respiratory infection, symptom burden, and weighing benefits and 

burdens antibiotic treatment.

## 2017-12-14 ENCOUNTER — HOSPITAL ENCOUNTER (OUTPATIENT)
Dept: HOSPITAL 76 - LAB.R | Age: 82
Discharge: HOME | End: 2017-12-14
Attending: SKILLED NURSING FACILITY
Payer: MEDICARE

## 2017-12-14 DIAGNOSIS — E46: ICD-10-CM

## 2017-12-14 DIAGNOSIS — E78.5: ICD-10-CM

## 2017-12-14 DIAGNOSIS — E11.9: Primary | ICD-10-CM

## 2017-12-14 LAB
ALBUMIN/GLOB SERPL: 1 {RATIO} (ref 1–2.2)
ANION GAP SERPL CALCULATED.4IONS-SCNC: 10 MMOL/L (ref 6–13)
BASOPHILS NFR BLD AUTO: 0 10^3/UL (ref 0–0.1)
BASOPHILS NFR BLD AUTO: 0.4 %
BILIRUB BLD-MCNC: 1.1 MG/DL (ref 0.2–1)
BUN SERPL-MCNC: 7 MG/DL (ref 6–20)
CALCIUM UR-MCNC: 8.9 MG/DL (ref 8.5–10.3)
CHLORIDE SERPL-SCNC: 98 MMOL/L (ref 101–111)
CHOLEST SERPL-MCNC: 219 MG/DL
CO2 SERPL-SCNC: 30 MMOL/L (ref 21–32)
CREAT SERPLBLD-SCNC: 0.7 MG/DL (ref 0.6–1.2)
EOSINOPHIL # BLD AUTO: 0.5 10^3/UL (ref 0–0.7)
EOSINOPHIL NFR BLD AUTO: 6.9 %
ERYTHROCYTE [DISTWIDTH] IN BLOOD BY AUTOMATED COUNT: 14.2 % (ref 12–15)
EST. AVERAGE GLUCOSE BLD GHB EST-MCNC: 137 MG/DL (ref 70–100)
GFRSERPLBLD MDRD-ARVRAT: 107 ML/MIN/{1.73_M2} (ref 89–?)
GLOBULIN SER-MCNC: 3.2 G/DL (ref 2.1–4.2)
GLUCOSE SERPL-MCNC: 176 MG/DL (ref 70–100)
HBA1C BLD-MCNC: 0.61 G/DL
HCT VFR BLD AUTO: 37.4 % (ref 42–52)
HDLC SERPL-MCNC: 42 MG/DL
HDLC SERPL: 5.2 {RATIO} (ref ?–5)
HGB UR QL STRIP: 12.6 G/DL (ref 14–18)
LDLC/HDLC SERPL: 3.5 {RATIO} (ref ?–3.6)
LYMPHOCYTES # SPEC AUTO: 1.7 10^3/UL (ref 1.5–3.5)
LYMPHOCYTES NFR BLD AUTO: 24 %
MCH RBC QN AUTO: 31.7 PG (ref 27–31)
MCHC RBC AUTO-ENTMCNC: 33.8 G/DL (ref 32–36)
MCV RBC AUTO: 93.6 FL (ref 80–94)
MONOCYTES # BLD AUTO: 0.6 10^3/UL (ref 0–1)
MONOCYTES NFR BLD AUTO: 7.8 %
NEUTROPHILS # BLD AUTO: 4.4 10^3/UL (ref 1.5–6.6)
NEUTROPHILS # SNV AUTO: 7.3 X10^3/UL (ref 4.8–10.8)
NEUTROPHILS NFR BLD AUTO: 60.9 %
NRBC # BLD AUTO: 0 /100WBC
PDW BLD AUTO: 7.9 FL (ref 7.4–11.4)
POTASSIUM SERPL-SCNC: 4 MMOL/L (ref 3.5–5)
PROT SPEC-MCNC: 6.5 G/DL (ref 6.7–8.2)
RBC MAR: 3.99 10^6/UL (ref 4.7–6.1)
SODIUM SERPLBLD-SCNC: 138 MMOL/L (ref 135–145)
TRIGL P FAST SERPL-MCNC: 142 MG/DL
VLDLC SERPL-SCNC: 28 MG/DL
WBC # BLD: 7.3 X10^3/UL

## 2017-12-14 PROCEDURE — 80061 LIPID PANEL: CPT

## 2017-12-14 PROCEDURE — 82570 ASSAY OF URINE CREATININE: CPT

## 2017-12-14 PROCEDURE — 85025 COMPLETE CBC W/AUTO DIFF WBC: CPT

## 2017-12-14 PROCEDURE — 80053 COMPREHEN METABOLIC PANEL: CPT

## 2017-12-14 PROCEDURE — 83036 HEMOGLOBIN GLYCOSYLATED A1C: CPT

## 2017-12-14 PROCEDURE — 82043 UR ALBUMIN QUANTITATIVE: CPT

## 2018-01-09 ENCOUNTER — HOSPITAL ENCOUNTER (OUTPATIENT)
Dept: HOSPITAL 76 - PC | Age: 83
Discharge: HOME | End: 2018-01-09
Attending: NURSE PRACTITIONER
Payer: MEDICARE

## 2018-01-09 DIAGNOSIS — Z79.02: ICD-10-CM

## 2018-01-09 DIAGNOSIS — Z79.51: ICD-10-CM

## 2018-01-09 DIAGNOSIS — R53.83: ICD-10-CM

## 2018-01-09 DIAGNOSIS — Z96.0: ICD-10-CM

## 2018-01-09 DIAGNOSIS — Z99.3: ICD-10-CM

## 2018-01-09 DIAGNOSIS — K59.00: ICD-10-CM

## 2018-01-09 DIAGNOSIS — Z51.5: Primary | ICD-10-CM

## 2018-01-09 DIAGNOSIS — R33.8: ICD-10-CM

## 2018-01-09 DIAGNOSIS — J44.9: ICD-10-CM

## 2018-01-09 DIAGNOSIS — M62.81: ICD-10-CM

## 2018-01-09 DIAGNOSIS — N40.1: ICD-10-CM

## 2018-01-09 DIAGNOSIS — N40.0: ICD-10-CM

## 2018-01-09 DIAGNOSIS — M19.011: ICD-10-CM

## 2018-01-09 DIAGNOSIS — E11.9: ICD-10-CM

## 2018-01-09 DIAGNOSIS — Z99.81: ICD-10-CM

## 2018-01-09 DIAGNOSIS — R63.5: ICD-10-CM

## 2018-01-09 DIAGNOSIS — Z66: ICD-10-CM

## 2018-01-09 DIAGNOSIS — F03.90: ICD-10-CM

## 2018-01-09 DIAGNOSIS — Z87.440: ICD-10-CM

## 2018-01-09 DIAGNOSIS — Z87.01: ICD-10-CM

## 2018-01-09 DIAGNOSIS — F41.9: ICD-10-CM

## 2018-01-09 PROCEDURE — 99310 SBSQ NF CARE HIGH MDM 45: CPT

## 2018-01-09 NOTE — CONSULTATION NOTE
Palliative Care Follow Up





- Referral


Referring Provider: Dr. Rodriguez/Dr. Alba assuming care


Time of Visit: [id number]


Referral setting: Skilled Nursing Facility


Referral Reason: COPD/BPH





- Information Sources


Records reviewed: RN notes reviewed, Previous records reviewed


History/Review of Systems obtained from: Patient


Exam limitations: Clinical condition (Patient with short-term memory deficits, 

able to answer yes no and some recall.)





- History of Present Illness Update


Brief HPI Update: 


This is a nati 84-year-old gentleman with severe end-stage COPD, who has had 

a history of recurrent pneumonias and COPD exacerbations often though not always

, resulting in hospitalizations and rehab stays, as well as history recurrent 

urinary infections both prior to and post aldrich catheter placement for BPH and 

retention. He was hospitalized in January 2017 twice, with pneumonia and a 

urinary infection. His second hospitalization resulted in development of 

urinary retention, he had developed an acute pontine stroke for which he had 

been started on Plavix. At this point in time he was found that he was not able 

to return home and was sent to [facility] for rehab on Medicare stay.  He adjusted 

nicely, enjoyed the interaction and support, and his care needs both emotional 

and physical were being met at an improved level.  Given the patient's lack of 

social support and a caregiver and patients cognitive deficits, he was 

transitioned for ongoing senior living care under a Medicaid benefit.





In requested summary of chronic indwelling aldrich catheter. In January had place 

post stroke stay for retention, from hospital, the patient had initially been 

referred to urologist, unfortunately at his urology appointment he had a fall 

and ended up in the ED and was not able to complete his evaluation.  Patient 

had been trialed on tamulosin, and then trialed off catheter, this had to be 

aborted again as not successful at the nursing home.  He continues to present 

with severe symptoms of BPH, including difficult catheter changes, urinary 

retention confirmed in subsequent hospital stays, and patient is not a 

candidate for surgical intervention given his fragility, palliative goals of 

care, and underlying advanced COPD.  In conjunction with his durable power of 

health , decision was made to continue to manage him with a palliative 

approach which included continue with the Aldrich catheter for his BPH/retention, 

given his multidrug resistant organisms, treating only if symptomatic.





He has done fairly well with his last hospitalization in September 2017, and 

his last treatment for respiratory symptoms in November 2017. He is at high 

risk for recurrent infections both respiratory and urinary given his past 

history and fragile status, goals remain to focus on quality of life issues and 

provide support.








Social History





- Living Situation


Living arrangement: Nursing home


Support System: 


Patient is supported by his friends Casper and Hollis Bynum who are also DPOAs

, They visit him weekly, oversee his appointments, and are involved in decision 

making as patient does not present with decision-making capacity.  He is able 

to express his values and wishes on but unable to weigh benefits and burdens 

regarding critical thinking.They visit him on a weekly basis, and are truly 

committed and fond of him.








Medications/Allergies





- Medications


Home Medications: 


 Ambulatory Orders











 Medication  Instructions  Recorded  Confirmed


 


Acetaminophen 650 mg PO Q4H PRN 11/26/16 01/09/18


 


Fluticasone/Salmeterol [Advair 1 inh INH BID 11/26/16 01/09/18





100-50 Diskus]   


 


Clopidogrel [Plavix] 75 mg PO DAILY 30 Days  tablet 01/18/17 01/09/18


 


Gabapentin [Neurontin] 100 mg PO TID 07/22/17 01/09/18


 


Ipratropium/Albuterol [Duoneb] 3 ml INH Q6H PRN 07/22/17 01/09/18


 


LORazepam [Lorazepam] 0.25 mg PO Q6H PRN 07/22/17 01/09/18


 


Mirtazapine [Remeron] 15 mg PO QPM 07/22/17 01/09/18


 


Tamsulosin HCl [Flomax] 0.4 mg PO QPM 07/22/17 01/09/18


 


Latanoprost 0.005% Ophth Drops 1 drops EACHEYE QPM #1 bottle 07/25/17 01/09/18





[Xalatan Ophth Drops]   


 


Calcium Carbonate [Tums (Calcium 500 mg PO Q8HR PRN #30 tablet 07/27/17 01/09/18





Carbonate 500mg)]   


 


Cholecalciferol [Vitamin D3] 5,000 unit PO BID #30 capsule 07/27/17 01/09/18


 


Bisacodyl Supp [Dulcolax Supp] 10 mg WI DAILY PRN 09/26/17 01/09/18


 


Senna [Senokot] 1 - 2 tab PO DAILY PRN 09/26/17 01/09/18


 


Guaifenesin [Child Mucinex Chest 10 ml PO Q4H PRN 11/10/17 01/09/18





Congestion]   














- Allergies


Allergies/Adverse Reactions: 


 Allergies











Allergy/AdvReac Type Severity Reaction Status Date / Time


 


No Known Drug Allergies Allergy   Verified 07/22/17 13:07














Review of Systems





- Constitutional


Constitutional: reports: Fatigue, Weight gain (165.5)





- Eyes


Eyes: reports: Vision loss





- Ears, Nose & Throat


Ears, Nose & Throat: reports: Hearing loss





- Cardiovascular


Cardiovascular: reports: Exertional dyspnea, Decr. exercise tolerance





- Respiratory


Respiratory: reports: Wheezing, SOB with exertion





- Gastrointestinal


Gastrointestinal: reports: Change in bowel habits (Patient struggles with 

intermittent constipation alternating with diarrhea, does have somewhat of a 

bowel fixation and sometimes a dumping syndrome.), Good appetite.  denies: 

Nausea





- Genitourinary


Genitourinary: reports: Other (aldrich catheter for urinary retention)





- Musculoskeletal


Musculoskeletal: reports: Stiffness, Joint pain (severe right shoulder pain 

secondary to osteoarthritis), Transfer issues (Patient a difficult pivot 

transfer into wheelchair, secondary to stiffness and bilateral shoulder pain.  

Does wheel self in wheelchair around facility and "walk" to get to places 

sometimes backwards.)





- Integumentary


Integumentary: reports: Rash (Intermittent rashes on face does not tolerate 

shaving well), Dryness





- Neurological


Neurological: reports: General weakness, Memory problems (Very pleasant, tends 

to be very social but able to remember some long term memories; still 

recognizes friends; likes to tease and interact with staff)





- Psychiatric


Psychiatric: reports: Anxiety (intermittent).  denies: Depression (Denies 

depression today, often defaults to what is the point as far as asking what he 

worries about.  He is actually quite satisfied and content in his current 

setting)





- Endocrine


Endocrine: reports: Diabetes type 2 (within acceptable range recent Aic 12/14 

6.4)





- Hematologic/Lymphatic


Hematologic/Lymphatic: reports: Recurrent infections (last infection 

respiratory treated 11/2017)





- All Other Systems


All Other Systems: reports: Reviewed and negative





Physical Exam





- Vital Signs


Temperature: 98.4 C


Pulse Rate: 80


Respiratory Rate: 20


O2 Saturation: 93 (on 2 liters at rest; 89-90 off oxygen for five minutes; with 

min. activity on pulse oxygen 91%)


Blood Pressure: 132/72





- Physical Exam


General Appearance: positive: No acute distress


Eyes Bilateral: positive: Other (slightly reddened conjunctivae-norm for patient

)


ENT: positive: No signs of dehydration


Neck: positive: Trachea midline, Lymphadenopathy (R), Lymphadenopathy (L) (

tender to palpation; slight swelling), Stiff neck


Cardiovascular: positive: Regular rate & rhythm


Respiratory: positive: Diminished throughout, Wheezes (anteriorly exp. wheezes 

upper lobes)


Abdomen: positive: Nml bowel sounds, Other (rounded and full secondary to 

weight gain)


Skin: positive: Pallor, Dryness


Extremities: positive: No pedal edema, Other (limited ROM in shoulders; right 

worse than left; difficult transfer to wheelchair secondary to poor shuffling 

of feet and min. weight bearing)


Neurologic/Psychiatric: positive: Mood/affect nml, Disoriented to place, 

Disoriented to time, Weakness





Palliative Care





- POLST


Patient has POLST: Yes


POLST Status: DNR, Selective Treatment


Pain: Pain worsening, Location (bilateral shoulder pain; reports fluctuating in 

status "good days and bad days" attributed to  severe DJD; have trialed ATC 

apap without improvment; does receive about daily)


Constipation: Intermittent constipation


Feelings of wellbeing/Perceived Quality of Life: Fair, Acceptable


Performance Status: 


Patient is dependent for bathing, needs assistance with dressing as well.  He 

can feed himself but needs meals set up.  Does have intermittent incontinence, 

is mostly wheelchair bound but is able to wheel himself about.  He is in the 

restorative aid program continues to remain somewhat stable.





- Palliative Care


Discussion: 


Patient is unable to identify where he is, though he does report he is 

satisfied with the care and happy with the staff.  He does recall that Marky visit on a regular basis, and he that he still really likes his Oreos.  

He does have support of the palliative care , he likes country music, 

and does seem to have rapport with other residents as well at staff.  Is a 

nati sense of humor, though he has difficulty initiating conversation is 

quite social and response.  He denies have any worries or concerns, he does 

understand the fragility of his condition, was hoping to go in his sleep.





Did follow up with Casper, they had visited last week and found him quite jovial 

and they had an Oreo party.  Reports she is doing life review with him, he has 

many interesting stories and has been able to share those with prompting.  They 

continue to visit weekly, and make arrangements for appointments and finances.  

Patient has been significantly sick last couple of hospitalizations, and has 

pulled through despite concerns otherwise, at this point the decision has been 

to hospitalize him until the point that he no longer appears to enjoy his 

current quality of life.








Results





- Lab Results


Lab results reviewed: Yes


Lab and Imaging Results: 


reviewed labs drawn 12/14/2017 no concerns








Impression and Recommendations





- Palliative Care


Impression: 


This is a nati 84-year-old gentleman who lives at the nursing home, he has 

advanced end-stage COPD, and is at high risk for this sequela of recurrent 

infections both respiratory and urinary. His last infection was respiratory in 

November 2017, he remains quite frail, but is enjoying his current quality of 

life.  The goals continue to be palliative in nature focusing on comfort, 

weighing benefits and burdens of interventions and treatment in the context of 

his trajectory, with the focus on quality of life





Recommendations/Counseling Done: 


1.  Advanced COPD.  Patient remains quite fragile with ongoing respiratory 

compromise, is oxygen dependent.  Does demonstrate hypoxia off of his oxygen 

with activity, patient does like to take it off at times.  Will write order for 

continuous 2 L oxygen, can remove for comfort if wants secondary to palliative 

goals.  Patient remains at high risk for sequela of recurrent infections, will 

continue to monitor to clinical staff. Patient does have both nebulizzers and 

prn lorazepam for any distress, will continue current plan.


2.  BPH/has urinary retention. Patient with ongoing chronic indwelling Aldrich 

catheter, colonized at baseline.  We will continue to treat only for systemic 

symptoms. Patient difficult catheter change and painful for patient, will keep 

on Tamulosin to treat BPH to mitigate this. 


3.  Weight gain.  Patient does like his sweets including Oreos, does drink 

quite a bit of hot chocolate and cranberry juice for fluids.  Patient's blood 

sugars though remain within acceptable range.  And mirtazapine also can add to 

weight gain, will continue to monitor the patient is not in distress. Has been 

trialed on lower dose of mirtazipine with exacerbated depression, will leave 

currently.


4.  Severe DJD right shoulder.  This is intermittent in nature, declines wants 

any more "pills".  Has been trialed on scheduled Tylenol without any 

improvement.  Is using the Tylenol as needed, at this point in time patient 

declines any further intervention, most of his pains are with transfers and is 

fluctuating in nature.


5.  Dementia without behavioral disturbances.  Patient remains able to enjoy 

his current environment, involved and engage in social conversation, and has 

many admires here at the nursing home.  His friends visit him on a weekly basis

, are working on life review and have not noted decline.


6.  Advanced care planning.  Many conversations regarding goals of care have 

occurred both in the acute and SNF setting with both patient and DPOAs, POLST 

in place and reflective of current plan.





Time Spent: 


45 minutes with greater than 50% of this done in counseling with patient 

regarding current status, depression, pain management, and exploring quality of 

life issues.  Follow-up and coordination of care with clinical staff as well as 

the DPOA.

## 2018-01-15 ENCOUNTER — HOSPITAL ENCOUNTER (EMERGENCY)
Dept: HOSPITAL 76 - ED | Age: 83
LOS: 1 days | Discharge: HOME | End: 2018-01-16
Payer: MEDICARE

## 2018-01-15 ENCOUNTER — HOSPITAL ENCOUNTER (OUTPATIENT)
Dept: HOSPITAL 76 - EMS | Age: 83
Discharge: ANOTHER HOSPITAL | End: 2018-01-15
Attending: SURGERY
Payer: MEDICARE

## 2018-01-15 DIAGNOSIS — I11.0: ICD-10-CM

## 2018-01-15 DIAGNOSIS — E11.9: ICD-10-CM

## 2018-01-15 DIAGNOSIS — I50.9: ICD-10-CM

## 2018-01-15 DIAGNOSIS — F03.90: ICD-10-CM

## 2018-01-15 DIAGNOSIS — M13.812: Primary | ICD-10-CM

## 2018-01-15 DIAGNOSIS — M25.512: Primary | ICD-10-CM

## 2018-01-15 PROCEDURE — 73030 X-RAY EXAM OF SHOULDER: CPT

## 2018-01-15 PROCEDURE — 99283 EMERGENCY DEPT VISIT LOW MDM: CPT

## 2018-01-16 VITALS — SYSTOLIC BLOOD PRESSURE: 159 MMHG | DIASTOLIC BLOOD PRESSURE: 68 MMHG

## 2018-01-16 NOTE — XRAY REPORT
EXAM:

LEFT SHOULDER RADIOGRAPHY

 

EXAM DATE: 1/16/2018 02:19 AM.

 

CLINICAL HISTORY: Nontraumatic left shoulder, tenderness.

 

COMPARISON: 02/11/2014.

 

TECHNIQUE: 3 views.

 

FINDINGS: 

Bones: Normal. No fracture or bone lesion.

 

Joints: The glenohumeral and acromioclavicular joints are normally aligned. Severe acromioclavicular 
degenerative changes. Severe glenohumeral degenerative changes. 

 

Soft tissues: The visualized hemithorax is unremarkable. No soft tissue swelling.

 

IMPRESSION:

Severe degenerative changes about the left shoulder without acute abnormality seen.

 

[facility]

Referring Provider Line: [phone]

 

SITE ID: [id number]

## 2018-01-16 NOTE — XRAY PRELIMINARY REPORT
Accession: [id number]

Exam: XR SHOULDER 3 VIEW LT

 

IMPRESSION: Severe degenerative changes about the left shoulder without acute abnormality seen.

 

[facility]

 

SITE ID: [id number]

## 2018-01-16 NOTE — ED PHYSICIAN DOCUMENTATION
History of Present Illness





- Stated complaint


Stated Complaint: SHOULDER PAIN





- Chief complaint


Chief Complaint: Ext Problem





- History obtained from


History obtained from: Patient, EMS





- History of Present Illness


Timing: Unknown (patient is unreliable historian, cannot recall how long he has 

had this pain)


Pain level now: 5


Improved by: rest


Worsened by: movement





- Additonal information


Additional information: 





sent by ambulance from [facility] for shoulder pain. Transfer sheet indicates both 

shoulders, although patient tells me it is his left shoulder. As noted, he is 

unable to tell me when this started except "It's been going on for a while" (

per patient). When I ask if he means it has been hours, days, weeks, months, or 

years, he just repeats "for a while". 





Review of Systems


Musculoskeletal: reports: Joint pain.  denies: Neck pain, Back pain


Neurologic: denies: Focal weakness, Numbness, Headache





PD PAST MEDICAL HISTORY





- Past Medical History


Cardiovascular: Congestive heart failure, Hypertension


Respiratory: COPD


Neuro: Dementia


Endocrine/Autoimmune: Type 2 diabetes


GI: None


: Renal insuffiency


HEENT: Chronic hearing loss


Psych: None


Musculoskeletal: Chronic back pain


Derm: None





- Past Surgical History


Past Surgical History: Yes


General: Colonoscopy


HEENT: Cataracts





- Present Medications


Home Medications: 


 Ambulatory Orders











 Medication  Instructions  Recorded  Confirmed


 


Acetaminophen 650 mg PO Q4H PRN 11/26/16 01/16/18


 


Fluticasone/Salmeterol [Advair 1 inh INH BID 11/26/16 01/16/18





100-50 Diskus]   


 


Clopidogrel [Plavix] 75 mg PO DAILY 30 Days  tablet 01/18/17 01/16/18


 


Gabapentin [Neurontin] 100 mg PO TID 07/22/17 01/16/18


 


Ipratropium/Albuterol [Duoneb] 3 ml INH Q6H PRN 07/22/17 01/16/18


 


LORazepam [Lorazepam] 0.25 mg PO Q6H PRN 07/22/17 01/16/18


 


Mirtazapine [Remeron] 15 mg PO QPM 07/22/17 01/16/18


 


Tamsulosin HCl [Flomax] 0.4 mg PO QPM 07/22/17 01/16/18


 


Latanoprost 0.005% Ophth Drops 1 drops EACHEYE QPM #1 bottle 07/25/17 01/16/18





[Xalatan Ophth Drops]   


 


Calcium Carbonate [Tums (Calcium 500 mg PO Q8HR PRN #30 tablet 07/27/17 01/16/18





Carbonate 500mg)]   


 


Cholecalciferol [Vitamin D3] 5,000 unit PO BID #30 capsule 07/27/17 01/16/18


 


Guaifenesin [Child Mucinex Chest 10 ml PO Q4H PRN 11/10/17 01/16/18





Congestion]   














- Allergies


Allergies/Adverse Reactions: 


 Allergies











Allergy/AdvReac Type Severity Reaction Status Date / Time


 


No Known Drug Allergies Allergy   Verified 01/16/18 00:07














- Social History


Does the pt smoke?: No


Smoking Status: Never smoker


Does the pt drink ETOH?: No


Does the pt have substance abuse?: No





- Immunizations


Immunizations are current?: No


Immunizations: TDAP >10years/unknown





- POLST


Patient has POLST: Yes


POLST Status: DNR





PD ED PE NORMAL





- Vitals


Vital signs reviewed: Yes





- General


General: No acute distress, Well developed/nourished, Other (awake, alert. 

disoriented to time, fluctuating orientation to place, oriented to self. )





- Cardiac


Cardiac: RRR, No murmur





- Respiratory


Respiratory: No respiratory distress, Clear bilaterally





- Derm


Derm: Normal color, Warm and dry, No rash





- Extremities


Extremities: No edema





- Neuro


Neuro: No motor deficit, No sensory deficit





PD ED PE EXPANDED





- Extremities


Extremities: Limited ROM (abduction or flexion of left shoulder beyond 45 

degrees causes painful discomfort. no crepitus of joint with movement. LTS 

intact distally. )





Results





- Vitals


Vitals: 


 Vital Signs - 24 hr











  01/15/18 01/16/18 01/16/18





  23:57 02:38 04:55


 


Temperature 36.7 C 36.7 C 36.5 C


 


Heart Rate 81 90 88


 


Respiratory 22 16 18





Rate   


 


Blood Pressure 121/65 135/70 H 132/74 H


 


O2 Saturation 92 94 97








 Oxygen











O2 Source []                   Room air


 


O2 Source []                   2L via NC


 


O2 Source                      Room air

















- Rads (name of study)


  ** left shoulder xrays


Radiology: Prelim report reviewed, See rad report





PD MEDICAL DECISION MAKING





- ED course


Complexity details: reviewed results, re-evaluated patient, considered 

differential, d/w patient





Departure





- Departure


Disposition: 01 Home, Self Care


Clinical Impression: 


 Arthritis





Condition: Good


Instructions:  ED Joint Pain, ED Degenerative Joint Disease


Follow-Up: 


Roscoe Alvarez MD [Primary Care Provider] -

## 2018-01-23 ENCOUNTER — HOSPITAL ENCOUNTER (OUTPATIENT)
Dept: HOSPITAL 76 - LAB.R | Age: 83
Discharge: HOME | End: 2018-01-23
Attending: SKILLED NURSING FACILITY
Payer: MEDICARE

## 2018-01-23 DIAGNOSIS — R30.0: Primary | ICD-10-CM

## 2018-01-23 LAB
CLARITY UR REFRACT.AUTO: (no result)
GLUCOSE UR QL STRIP.AUTO: NEGATIVE MG/DL
KETONES UR QL STRIP.AUTO: NEGATIVE MG/DL
NITRITE UR QL STRIP.AUTO: POSITIVE
PH UR STRIP.AUTO: 7 PH (ref 5–7.5)
PROT UR STRIP.AUTO-MCNC: 30 MG/DL
RBC # UR STRIP.AUTO: (no result) /UL
RBC # URNS HPF: (no result) /HPF (ref 0–5)
SP GR UR STRIP.AUTO: 1.01 (ref 1–1.03)
SQUAMOUS URNS QL MICRO: (no result)
UROBILINOGEN UR QL STRIP.AUTO: (no result) E.U./DL
UROBILINOGEN UR STRIP.AUTO-MCNC: NEGATIVE MG/DL

## 2018-01-23 PROCEDURE — 81001 URINALYSIS AUTO W/SCOPE: CPT

## 2018-01-23 PROCEDURE — 87086 URINE CULTURE/COLONY COUNT: CPT

## 2018-01-23 PROCEDURE — 81003 URINALYSIS AUTO W/O SCOPE: CPT

## 2018-01-25 ENCOUNTER — HOSPITAL ENCOUNTER (OUTPATIENT)
Dept: HOSPITAL 76 - PC | Age: 83
Discharge: HOME | End: 2018-01-25
Attending: NURSE PRACTITIONER
Payer: MEDICARE

## 2018-01-25 DIAGNOSIS — Z96.0: ICD-10-CM

## 2018-01-25 DIAGNOSIS — M62.81: ICD-10-CM

## 2018-01-25 DIAGNOSIS — Z99.3: ICD-10-CM

## 2018-01-25 DIAGNOSIS — J44.1: ICD-10-CM

## 2018-01-25 DIAGNOSIS — Z66: ICD-10-CM

## 2018-01-25 DIAGNOSIS — E11.9: ICD-10-CM

## 2018-01-25 DIAGNOSIS — Z51.5: Primary | ICD-10-CM

## 2018-01-25 DIAGNOSIS — Z99.81: ICD-10-CM

## 2018-01-25 DIAGNOSIS — M25.512: ICD-10-CM

## 2018-01-25 DIAGNOSIS — K59.00: ICD-10-CM

## 2018-01-25 DIAGNOSIS — F32.9: ICD-10-CM

## 2018-01-25 DIAGNOSIS — Z87.440: ICD-10-CM

## 2018-01-25 DIAGNOSIS — M25.511: ICD-10-CM

## 2018-01-25 DIAGNOSIS — N40.0: ICD-10-CM

## 2018-01-25 DIAGNOSIS — Z79.51: ICD-10-CM

## 2018-01-25 DIAGNOSIS — Z87.01: ICD-10-CM

## 2018-01-25 PROCEDURE — 99309 SBSQ NF CARE MODERATE MDM 30: CPT

## 2018-01-25 NOTE — CONSULTATION NOTE
Palliative Care Follow Up





- Referral


Referring Provider: Dr. Milo Alba


Time of Visit: 5364-1515


Referral setting: Skilled Nursing Facility


Referral Reason: Fever Unknown Origin/COPD exacerbation





- Information Sources


Records reviewed: Previous records reviewed


History/Review of Systems obtained from: Caregiver


Exam limitations: Clinical condition (patient with dementia; able to answer 

questions in the moment)





- History of Present Illness Update


Brief HPI Update: 


This is a nati 84 old gentleman with severe end-stage COPD who has had a 

history of recurrent pneumonias and COPD exacerbations as well as intermittent 

history of recurrent urinary infections both prior to and post Aldrich catheter 

placement for BPH and retention.  He did present on 123 with a temp of 100.7, 

as well as reported from staff expiratory wheezes and some shortness of breath.

  At that point in time chest x-ray was obtained which was negative for 

infiltrate, UA was obtained as well still pending results.  Though his urine 

does appear clear and without sediment today.  He still had a temperature 99.8 

yesterday p.m. today he presents at 98.4.  He does have increasing discomfort 

in his shoulders bilaterally, we have titrated up his gabapentin slowly to see 

if this would assist.  He has been on scheduled acetaminophen in the past 

without any improvement, he remains on the Voltaren gel 3 times a day.  Patient 

himself does not want to add more "pills".  He reports he is feeling better, 

denies any cough, he does have a few inspiratory and expiratory wheezes but 

does present with O2 sats at 95% on 2 L and does not appear acutely ill today.








Social History





- Living Situation


Living arrangement: Nursing home


Support System: 


His DPOA's Jemima and Hollis remain Committed and visited him on a regular 

basis.  Patient does not have any other family that is available to provide 

support.  On patient's permanent residence is [facility], he has somewhat resigned 

to this fact and does actually feels like he is "in a good place".








Medications/Allergies





- Medications


Home Medications: 


 Ambulatory Orders











 Medication  Instructions  Recorded  Confirmed


 


Acetaminophen 650 mg PO Q4H PRN 11/26/16 01/25/18


 


Fluticasone/Salmeterol [Advair 1 inh INH BID 11/26/16 01/25/18





100-50 Diskus]   


 


Clopidogrel [Plavix] 75 mg PO DAILY 30 Days  tablet 01/18/17 01/25/18


 


Gabapentin [Neurontin] 100 mg PO .0700, 1400 07/22/17 01/25/18


 


Ipratropium/Albuterol [Duoneb] 3 ml INH Q6H PRN 07/22/17 01/25/18


 


Mirtazapine [Remeron] 15 mg PO QPM 07/22/17 01/25/18


 


Tamsulosin HCl [Flomax] 0.4 mg PO QPM 07/22/17 01/25/18


 


Latanoprost 0.005% Ophth Drops 1 drops EACHEYE QPM #1 bottle 07/25/17 01/25/18





[Xalatan Ophth Drops]   


 


Calcium Carbonate [Tums (Calcium 500 mg PO Q8HR PRN #30 tablet 07/27/17 01/25/18





Carbonate 500mg)]   


 


Guaifenesin [Child Mucinex Chest 10 ml PO Q4H PRN 11/10/17 01/25/18





Congestion]   


 


Cholecalciferol [Vitamin D3] 5,000 unit PO DAILY 01/25/18 01/25/18


 


Diclofenac Sodium [Voltaren] 2 gm TOP TID 01/25/18 01/25/18


 


Prednisone 10 mg PO .DAILY FOR 5 DAYS 01/25/18 01/25/18


 


Saccharomyces Boulardii [Florastor] 1 tab PO BID 01/25/18 01/25/18


 


Sulfamethox/Trimeth 800/160 1 tab PO BID 01/25/18 01/25/18





[Bactrim Ds]   


 


Senna [Senokot] 1 tab PO DAILY 01/26/18 01/26/18














- Allergies


Allergies/Adverse Reactions: 


 Allergies











Allergy/AdvReac Type Severity Reaction Status Date / Time


 


No Known Drug Allergies Allergy   Verified 01/16/18 00:07














Review of Systems





- Constitutional


Constitutional: reports: Fatigue, Weight stable





- Eyes


Eyes: reports: Vision loss





- Cardiovascular


Cardiovascular: denies: Chest pain





- Respiratory


Respiratory: reports: SOB with exertion





- Gastrointestinal


Gastrointestinal: reports: Constipation (staff feel patient needed regular 

bowel medication as is inconsistent; scheduled Senna with good BM today per 

report), Good appetite.  denies: Nausea





- Genitourinary


Genitourinary: reports: Other (aldrich catheter for BPH)





- Musculoskeletal


Musculoskeletal: reports: Limited range of motion (bilateral shoulder pain/

limitations left greater than right today)





- Integumentary


Integumentary: reports: Rash (dry flakey skin on head/face)





- Neurological


Neurological: reports: General weakness, Memory problems (very much in moment; 

aware of limitations)





- Psychiatric


Psychiatric: denies: Depression





- Endocrine


Endocrine: reports: Diabetes type 2





- Hematologic/Lymphatic


Hematologic/Lymphatic: reports: Recurrent infections (pneumonia/uti's)





- All Other Systems


All Other Systems: reports: Reviewed and negative





Physical Exam





- Vital Signs


Temperature: 98.4 C


Pulse Rate: 65


Respiratory Rate: 20


O2 Saturation: 95 (ra @ rest)


Blood Pressure: 122/64





- Physical Exam


General Appearance: positive: No acute distress


Eyes Bilateral: positive: Normal inspection


ENT: positive: No signs of dehydration


Neck: positive: No JVD, Trachea midline


Cardiovascular: positive: Regular rate & rhythm


Respiratory: positive: Wheezes (few scattered insp/exp. wheezes; no cough)


Abdomen: positive: Non-tender, Soft, Nml bowel sounds


Skin: positive: Dryness


Extremities: positive: No pedal edema, Other (limited range of motion with pain 

on exam to left shoulder; mild discomfort with right shoulder)


Neurologic/Psychiatric: positive: Mood/affect nml, Disoriented to time, 

Weakness (transfers to wheelchair only/toilet)





Palliative Care





- POLST


Patient has POLST: Yes


POLST Status: DNR, Selective Treatment


Pain: Pain unchanged, Location (bilateral shoulder pain; did have ED visit; 

unclear what precipitated it; was told sent out for "abdominal" pain;)


Feelings of wellbeing/Perceived Quality of Life: Fair, Acceptable


Performance Status: 


Patient mostly wheelchair bound, does need some assistance with transfers to 

toilet or wheelchair.  Does go to meals done in the dining room.  Is dependent 

on staff for all ADLs.  We put him at a PPS of 60%








- Palliative Care


Discussion: 


Patient continues to report he "does not worry about anything", denies 

depression, patient has always been somewhat pragmatic and in the moment.  

Denies any worries or concerns related to his current healthcare status, he 

does remain quite fragile with the goal to avoid hospitalization if possible.








Results





- Lab Results


Lab results reviewed: Yes


Lab and Imaging Results: 


Chest x-ray negative, ua results pending








Impression and Recommendations





- Palliative Care


Impression: 


This is a nati 84-year-old gentleman who remains at high risk for continuing 

sequela of his advanced COPD and recurrent urinary tract infections.  He has 

presented recently with fever, increase in wheezing, and concern for acute 

illness.  Patient also continues to struggle with intermittent shoulder pain, 

will titrate gabapentin up slowly and evaluate response.





Recommendations/Counseling Done: 


1.  COPD exacerbation.  Patient currently on prednisone 10 mg daily for 5 days, 

does appear to have responded with decreased wheezing and respiratory effort.  

No progression noted.  Is only needed occasional intermittent albuterol 

treatments.  Patient denies respiratory distress at this point in time.  Chest x

-ray was negative.





2.  A Aldrich catheter.  It is draining clear yellow urine at this point in time.

  He is on currently on antibiotics.  Patient is chronically colonized.  Pain 

previously had described as sharp shooting and needing to go, has since 

resolved.


3.  Constipation.  Staff perceived patient having difficulty with bowel 

movements, patient actually not able to recall.  Did schedule regular senna 8.6 

mg daily and attempt to address this.  I report good bowel movement this 

morning by staff.  Will continue to monitor if this is adequate program.


4.  Depression.  Patient does appear to be bright, presents without anxiety or 

depressive symptoms.  Has spent more time in bed the last couple days, but 

otherwise no further changes.


5.  Advanced care planning.  The goal is to try and avoid hospitalization, did 

treat empirically with subjective response, will continue to monitor.





Addendum report on 1/26 patient with low-grade temperature in the evening, did 

respond to albuterol treatment.  We will go ahead and schedule 3 times daily 

through the weekend and evaluate ongoing response.  UA was negative, follow-up 

with Dr. Alba, will continue as patient has responded to Bactrim at this 

point in time given his fragile status and advanced disease state with goals 

stated as avoiding hospitalization








Time Spent: 


30 minutes with greater than 50% of this done in counseling coordination of 

care with clinical staff support for patient's ongoing depression and will 

continue to monitor pain management and response

## 2018-02-26 ENCOUNTER — HOSPITAL ENCOUNTER (OUTPATIENT)
Dept: HOSPITAL 76 - PC | Age: 83
Discharge: HOME | End: 2018-02-26
Attending: NURSE PRACTITIONER
Payer: MEDICARE

## 2018-02-26 DIAGNOSIS — K59.00: ICD-10-CM

## 2018-02-26 DIAGNOSIS — Z99.81: ICD-10-CM

## 2018-02-26 DIAGNOSIS — N40.0: ICD-10-CM

## 2018-02-26 DIAGNOSIS — Z96.0: ICD-10-CM

## 2018-02-26 DIAGNOSIS — M62.81: ICD-10-CM

## 2018-02-26 DIAGNOSIS — Z66: ICD-10-CM

## 2018-02-26 DIAGNOSIS — R63.5: ICD-10-CM

## 2018-02-26 DIAGNOSIS — J44.9: ICD-10-CM

## 2018-02-26 DIAGNOSIS — R53.83: ICD-10-CM

## 2018-02-26 DIAGNOSIS — Z51.5: Primary | ICD-10-CM

## 2018-02-26 PROCEDURE — 99308 SBSQ NF CARE LOW MDM 20: CPT

## 2018-02-26 NOTE — CONSULTATION NOTE
Palliative Care Follow Up





- Referral


Referring Provider: Dr Gopal Alba


Time of Visit: 2/26/2018.  15:45 - 16:00


Referral setting: Skilled Nursing Facility





- Information Sources


Records reviewed: RN notes reviewed, Previous records reviewed


History/Review of Systems obtained from: Patient, Nursing


Exam limitations: Clinical condition (Dementia; memory deficits)





- History of Present Illness Update


Brief HPI Update: 





This is a nati 84-year-old gentleman with severe end-stage COPD and history 

of recurrent pneumonias and COPD exacerbations, as well as an intermittent 

history of recurrent urinary infections both prior to and post-Gupta catheter 

placement for BPH and retention.





This patient has been stable since the last visit, and appears to be 1 of the 

few residents at his facility who did not succumb to the recent GI infection in 

the facility.  He does state today he is not feeling very well but is not able 

to elaborate further except to say I "just feel wore out."  He denies pain, 

nausea, vomiting, SOB, chest pain, dysuria.  Vital signs are stable and O2 sats 

are at 97% on 2.5 L of oxygen.  He does report "itchy" legs, but the skin is 

clear, with no signs of rash or redness or lesions.











Social History





- Living Situation


Living arrangement: Nursing home


Living Situation: With caregiver(s) (His friends/DPOAs, Jemima and Hollis, visit 

him frequently, the patient has no other family or close friends available for 

support.)





Medications/Allergies





- Medications


Home Medications: 


 Ambulatory Orders











 Medication  Instructions  Recorded  Confirmed


 


Acetaminophen 650 mg PO Q6H PRN 11/26/16 02/26/18


 


Fluticasone/Salmeterol [Advair 1 inh INH BID 11/26/16 02/26/18





100-50 Diskus]   


 


Clopidogrel [Plavix] 75 mg PO DAILY 30 Days  tablet 01/18/17 02/26/18


 


Gabapentin [Neurontin] 100 mg PO .0700, 1400 07/22/17 02/26/18


 


Ipratropium/Albuterol [Duoneb] 3 ml INH Q6H PRN 07/22/17 02/26/18


 


Mirtazapine [Remeron] 15 mg PO QPM 07/22/17 02/26/18


 


Tamsulosin HCl [Flomax] 0.4 mg PO QPM 07/22/17 02/26/18


 


Latanoprost 0.005% Ophth Drops 1 drops EACHEYE QPM #1 bottle 07/25/17 02/26/18





[Xalatan Ophth Drops]   


 


Calcium Carbonate [Tums (Calcium 500 mg PO Q8HR PRN #30 tablet 07/27/17 02/26/18





Carbonate 500mg)]   


 


Guaifenesin [Child Mucinex Chest 10 ml PO Q4H PRN 11/10/17 02/26/18





Congestion]   


 


Cholecalciferol [Vitamin D3] 5,000 unit PO .EVERY FRIDAY 01/25/18 02/26/18


 


Diclofenac Sodium [Voltaren] 2 gm TOP TID 01/25/18 01/25/18


 


Saccharomyces Boulardii [Florastor] 1 tab PO BID 01/25/18 02/26/18


 


Senna [Senokot] 1 tab PO DAILY 01/26/18 02/26/18


 


Gabapentin 200 mg PO .QHS 02/26/18 02/26/18














- Allergies


Allergies/Adverse Reactions: 


 Allergies











Allergy/AdvReac Type Severity Reaction Status Date / Time


 


No Known Drug Allergies Allergy   Verified 01/16/18 00:07














Review of Systems





- Constitutional


Constitutional: reports: Fatigue, Weight gain (165.3 lbs on 2/6/2018.  156.9 

lbs 7/7/2017.  144.4 lbs 2/1/2017.)





- Eyes


Eyes: reports: Corrective lenses





- Ears, Nose & Throat


Ears, Nose & Throat: reports: Hearing loss





- Respiratory


Respiratory: reports: SOB with exertion.  denies: Wheezing, SOB at rest





- Gastrointestinal


Gastrointestinal: denies: Abdominal pain, Constipation, Diarrhea, Nausea, Poor 

appetite





- Genitourinary


Genitourinary: reports: Other (Gupta catheter).  denies: Dysuria, Frequency





- Musculoskeletal


Musculoskeletal: reports: Other (Chronic shoulder pain, no complaints today)





- Integumentary


Integumentary: reports: Pruritis (on lower extremities. Monitor)





- Neurological


Neurological: reports: General weakness (unable to sit up in bed without aid), 

Memory problems, Slurred speech





- Psychiatric


Psychiatric: denies: Depression, Anxiety, Behavior disturbances





- Hematologic/Lymphatic


Hematologic/Lymphatic: reports: Recurrent infections (UTIs and pneumonias)





Physical Exam





- Vital Signs


Temperature: 97.4 F


Pulse Rate: 63


O2 Saturation: 97


Blood Pressure: 119/68





- Physical Exam


General Appearance: positive: No acute distress, Alert


Eyes Bilateral: positive: EOMI, No lid inflammation, Conjunctivae nml, No 

scleral icterus


ENT: positive: No signs of dehydration


Neck: positive: Thyroid nml, No JVD, Trachea midline


Cardiovascular: positive: Regular rate & rhythm, Systolic murmur


Respiratory: positive: Chest non-tender, No respiratory distress, Diminished 

throughout.  negative: Wheezes, Rales


Skin: positive: No symptoms


Extremities: positive: Nml appearance, No pedal edema


Neurologic/Psychiatric: positive: Mood/affect nml, Disoriented to place, 

Disoriented to time





Palliative Care





- POLST


Patient has POLST: Yes


POLST Status: DNR, Comfort Measures


Pain: Comment (No complaints of shoulder pain today)


Depression: None


Dyspnea: None


Sleep: Sleeps well





- Palliative Care


Discussion: 





Focus is comfort and quality of life. OK to transfer to hospital, but would 

prefer to avoid hospitalization. He is agreeable to be treated if quality of 

life can be maintained. He does state that today he "just feels wore out."








Impression and Recommendations





- Palliative Care


Impression: 





This is a nati 84 year old man with several significant co-morbidities and h/

o recurrent pneumonias and UTIs, for which he is at high risk of continuing 

sequelae. He is currently stable but complains of feeling "wore out."





Recommendations/Counseling Done: 





End-stage COPD:  Currently stable. Continue Duonebs treatment as needed, 

continuous oxygen via nasal cannula, Advair BID, and ipratropium/albuterol as 

needed.





Constipation: Improved particularly in the last week, continue Senna one tab 

daily. Continue to monitor, consider adding Miralax daily.





Shoulder pain: Improved with the addition of gabapentin at nighttime, continue 

current dosage:  Gabapentin 100mg BID, and gabapentin 200mg at bedtime.  

Confirm if Voltaren gel is still being given.





Advanced care planning:  Goal is comfort care and quality of life, OK to 

transfer to hospital as needed but prefers to avoid.  Decide on case by case 

basis, as long as his quality of life can be maintained.





Follow up monthly and as needed.


 


Time Spent: 





15 minutes were spent with more than 50% of the time spent on counseling, 

education, and coordination of care.

## 2018-03-07 ENCOUNTER — HOSPITAL ENCOUNTER (OUTPATIENT)
Dept: HOSPITAL 76 - EMS | Age: 83
Discharge: ANOTHER HOSPITAL | End: 2018-03-07
Attending: SURGERY
Payer: MEDICARE

## 2018-03-07 ENCOUNTER — HOSPITAL ENCOUNTER (INPATIENT)
Dept: HOSPITAL 76 - ED | Age: 83
LOS: 5 days | Discharge: NURSING, REHAB OR LONG-TERM CARE | DRG: 698 | End: 2018-03-12
Attending: NURSE PRACTITIONER | Admitting: INTERNAL MEDICINE
Payer: MEDICARE

## 2018-03-07 DIAGNOSIS — F03.90: ICD-10-CM

## 2018-03-07 DIAGNOSIS — J44.0: ICD-10-CM

## 2018-03-07 DIAGNOSIS — Z79.02: ICD-10-CM

## 2018-03-07 DIAGNOSIS — Z74.01: ICD-10-CM

## 2018-03-07 DIAGNOSIS — R13.10: ICD-10-CM

## 2018-03-07 DIAGNOSIS — N39.0: ICD-10-CM
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
## 2018-03-07 DIAGNOSIS — A41.89: ICD-10-CM

## 2018-03-07 DIAGNOSIS — R39.89: Primary | ICD-10-CM

## 2018-03-07 DIAGNOSIS — I11.0: ICD-10-CM

## 2018-03-07 DIAGNOSIS — E86.1: ICD-10-CM

## 2018-03-07 DIAGNOSIS — E11.9: ICD-10-CM

## 2018-03-07 DIAGNOSIS — R03.1: ICD-10-CM

## 2018-03-07 DIAGNOSIS — N30.90: ICD-10-CM

## 2018-03-07 DIAGNOSIS — R33.8: ICD-10-CM

## 2018-03-07 DIAGNOSIS — N40.1: ICD-10-CM

## 2018-03-07 DIAGNOSIS — Z91.81: ICD-10-CM

## 2018-03-07 DIAGNOSIS — I95.9: ICD-10-CM

## 2018-03-07 DIAGNOSIS — Z77.090: ICD-10-CM

## 2018-03-07 DIAGNOSIS — R50.9: ICD-10-CM

## 2018-03-07 DIAGNOSIS — T83.511A: Primary | ICD-10-CM

## 2018-03-07 DIAGNOSIS — Z87.891: ICD-10-CM

## 2018-03-07 DIAGNOSIS — R32: ICD-10-CM

## 2018-03-07 DIAGNOSIS — J18.1: ICD-10-CM

## 2018-03-07 DIAGNOSIS — E87.1: ICD-10-CM

## 2018-03-07 DIAGNOSIS — Z99.81: ICD-10-CM

## 2018-03-07 DIAGNOSIS — K57.90: ICD-10-CM

## 2018-03-07 DIAGNOSIS — J44.9: ICD-10-CM

## 2018-03-07 DIAGNOSIS — Z66: ICD-10-CM

## 2018-03-07 DIAGNOSIS — I50.9: ICD-10-CM

## 2018-03-07 DIAGNOSIS — E78.5: ICD-10-CM

## 2018-03-07 LAB
ALBUMIN DIAFP-MCNC: 2.8 G/DL (ref 3.2–5.5)
ALBUMIN DIAFP-MCNC: 3 G/DL (ref 3.2–5.5)
ALBUMIN/GLOB SERPL: 0.8 {RATIO} (ref 1–2.2)
ALBUMIN/GLOB SERPL: 1 {RATIO} (ref 1–2.2)
ALP SERPL-CCNC: 107 IU/L (ref 42–121)
ALP SERPL-CCNC: 112 IU/L (ref 42–121)
ALT SERPL W P-5'-P-CCNC: 11 IU/L (ref 10–60)
ALT SERPL W P-5'-P-CCNC: < 10 IU/L (ref 10–60)
ANION GAP SERPL CALCULATED.4IONS-SCNC: 7 MMOL/L (ref 6–13)
ANION GAP SERPL CALCULATED.4IONS-SCNC: 8 MMOL/L (ref 6–13)
AST SERPL W P-5'-P-CCNC: 14 IU/L (ref 10–42)
AST SERPL W P-5'-P-CCNC: 17 IU/L (ref 10–42)
BASOPHILS NFR BLD AUTO: 0 10^3/UL (ref 0–0.1)
BASOPHILS NFR BLD AUTO: 0.1 10^3/UL (ref 0–0.1)
BASOPHILS NFR BLD AUTO: 0.2 %
BASOPHILS NFR BLD AUTO: 0.6 %
BILIRUB BLD-MCNC: 1.2 MG/DL (ref 0.2–1)
BILIRUB BLD-MCNC: 1.5 MG/DL (ref 0.2–1)
BUN SERPL-MCNC: 12 MG/DL (ref 6–20)
BUN SERPL-MCNC: 15 MG/DL (ref 6–20)
CALCIUM UR-MCNC: 7.9 MG/DL (ref 8.5–10.3)
CALCIUM UR-MCNC: 8 MG/DL (ref 8.5–10.3)
CHLORIDE SERPL-SCNC: 96 MMOL/L (ref 101–111)
CHLORIDE SERPL-SCNC: 97 MMOL/L (ref 101–111)
CLARITY UR REFRACT.AUTO: (no result)
CO2 SERPL-SCNC: 28 MMOL/L (ref 21–32)
CO2 SERPL-SCNC: 30 MMOL/L (ref 21–32)
CREAT SERPLBLD-SCNC: 0.7 MG/DL (ref 0.6–1.2)
CREAT SERPLBLD-SCNC: 0.9 MG/DL (ref 0.6–1.2)
CRYSTALS URNS QL MICRO: (no result) /LPF
EOSINOPHIL # BLD AUTO: 0.1 10^3/UL (ref 0–0.7)
EOSINOPHIL # BLD AUTO: 0.1 10^3/UL (ref 0–0.7)
EOSINOPHIL NFR BLD AUTO: 0.9 %
EOSINOPHIL NFR BLD AUTO: 1 %
ERYTHROCYTE [DISTWIDTH] IN BLOOD BY AUTOMATED COUNT: 13.2 % (ref 12–15)
ERYTHROCYTE [DISTWIDTH] IN BLOOD BY AUTOMATED COUNT: 13.4 % (ref 12–15)
EST. AVERAGE GLUCOSE BLD GHB EST-MCNC: 134 MG/DL (ref 70–100)
GFRSERPLBLD MDRD-ARVRAT: 107 ML/MIN/{1.73_M2} (ref 89–?)
GFRSERPLBLD MDRD-ARVRAT: 80 ML/MIN/{1.73_M2} (ref 89–?)
GLOBULIN SER-MCNC: 3 G/DL (ref 2.1–4.2)
GLOBULIN SER-MCNC: 3.3 G/DL (ref 2.1–4.2)
GLUCOSE SERPL-MCNC: 143 MG/DL (ref 70–100)
GLUCOSE SERPL-MCNC: 162 MG/DL (ref 70–100)
GLUCOSE UR QL STRIP.AUTO: NEGATIVE MG/DL
HB2 TOTAL: 10.9 G/DL
HBA1C BLD-MCNC: 0.5 G/DL
HEMOGLOBIN A1C %: 6.3 % (ref 4.6–6.2)
HGB UR QL STRIP: 10.4 G/DL (ref 14–18)
HGB UR QL STRIP: 11 G/DL (ref 14–18)
INFLUENZA A & B AG INTERPRET: (no result)
KETONES UR QL STRIP.AUTO: NEGATIVE MG/DL
LIPASE SERPL-CCNC: < 10 U/L (ref 22–51)
LYMPHOCYTES # SPEC AUTO: 1.4 10^3/UL (ref 1.5–3.5)
LYMPHOCYTES # SPEC AUTO: 1.8 10^3/UL (ref 1.5–3.5)
LYMPHOCYTES NFR BLD AUTO: 12 %
LYMPHOCYTES NFR BLD AUTO: 9.8 %
MCH RBC QN AUTO: 31.1 PG (ref 27–31)
MCH RBC QN AUTO: 32 PG (ref 27–31)
MCHC RBC AUTO-ENTMCNC: 33 G/DL (ref 32–36)
MCHC RBC AUTO-ENTMCNC: 34 G/DL (ref 32–36)
MCV RBC AUTO: 93.8 FL (ref 80–94)
MCV RBC AUTO: 94.2 FL (ref 80–94)
MONOCYTES # BLD AUTO: 1.1 10^3/UL (ref 0–1)
MONOCYTES # BLD AUTO: 1.3 10^3/UL (ref 0–1)
MONOCYTES NFR BLD AUTO: 7.4 %
MONOCYTES NFR BLD AUTO: 8.6 %
NEUTROPHILS # BLD AUTO: 11.8 10^3/UL (ref 1.5–6.6)
NEUTROPHILS # BLD AUTO: 12 10^3/UL (ref 1.5–6.6)
NEUTROPHILS # SNV AUTO: 14.4 X10^3/UL (ref 4.8–10.8)
NEUTROPHILS # SNV AUTO: 15.4 X10^3/UL (ref 4.8–10.8)
NEUTROPHILS NFR BLD AUTO: 77.8 %
NEUTROPHILS NFR BLD AUTO: 81.7 %
NITRITE UR QL STRIP.AUTO: POSITIVE
PDW BLD AUTO: 8 FL (ref 7.4–11.4)
PDW BLD AUTO: 8.2 FL (ref 7.4–11.4)
PH UR STRIP.AUTO: >=9 PH (ref 5–7.5)
PLATELET # BLD: 199 10^3/UL (ref 130–450)
PLATELET # BLD: 217 10^3/UL (ref 130–450)
PROT SPEC-MCNC: 6 G/DL (ref 6.7–8.2)
PROT SPEC-MCNC: 6.1 G/DL (ref 6.7–8.2)
PROT UR STRIP.AUTO-MCNC: 100 MG/DL
RBC # UR STRIP.AUTO: (no result) /UL
RBC # URNS HPF: (no result) /HPF (ref 0–5)
RBC MAR: 3.35 10^6/UL (ref 4.7–6.1)
RBC MAR: 3.46 10^6/UL (ref 4.7–6.1)
SODIUM SERPLBLD-SCNC: 133 MMOL/L (ref 135–145)
SODIUM SERPLBLD-SCNC: 133 MMOL/L (ref 135–145)
SP GR UR STRIP.AUTO: 1.01 (ref 1–1.03)
SQUAMOUS URNS QL MICRO: (no result)
UROBILINOGEN UR QL STRIP.AUTO: (no result) E.U./DL
UROBILINOGEN UR STRIP.AUTO-MCNC: NEGATIVE MG/DL

## 2018-03-07 PROCEDURE — 94640 AIRWAY INHALATION TREATMENT: CPT

## 2018-03-07 PROCEDURE — 87276 INFLUENZA A AG IF: CPT

## 2018-03-07 PROCEDURE — 80053 COMPREHEN METABOLIC PANEL: CPT

## 2018-03-07 PROCEDURE — 36415 COLL VENOUS BLD VENIPUNCTURE: CPT

## 2018-03-07 PROCEDURE — 87077 CULTURE AEROBIC IDENTIFY: CPT

## 2018-03-07 PROCEDURE — 87040 BLOOD CULTURE FOR BACTERIA: CPT

## 2018-03-07 PROCEDURE — 96361 HYDRATE IV INFUSION ADD-ON: CPT

## 2018-03-07 PROCEDURE — 74177 CT ABD & PELVIS W/CONTRAST: CPT

## 2018-03-07 PROCEDURE — 71045 X-RAY EXAM CHEST 1 VIEW: CPT

## 2018-03-07 PROCEDURE — 99285 EMERGENCY DEPT VISIT HI MDM: CPT

## 2018-03-07 PROCEDURE — 83690 ASSAY OF LIPASE: CPT

## 2018-03-07 PROCEDURE — 76770 US EXAM ABDO BACK WALL COMP: CPT

## 2018-03-07 PROCEDURE — 81001 URINALYSIS AUTO W/SCOPE: CPT

## 2018-03-07 PROCEDURE — 87640 STAPH A DNA AMP PROBE: CPT

## 2018-03-07 PROCEDURE — 83880 ASSAY OF NATRIURETIC PEPTIDE: CPT

## 2018-03-07 PROCEDURE — 83735 ASSAY OF MAGNESIUM: CPT

## 2018-03-07 PROCEDURE — 87275 INFLUENZA B AG IF: CPT

## 2018-03-07 PROCEDURE — 86140 C-REACTIVE PROTEIN: CPT

## 2018-03-07 PROCEDURE — 85025 COMPLETE CBC W/AUTO DIFF WBC: CPT

## 2018-03-07 PROCEDURE — 83036 HEMOGLOBIN GLYCOSYLATED A1C: CPT

## 2018-03-07 PROCEDURE — 87086 URINE CULTURE/COLONY COUNT: CPT

## 2018-03-07 PROCEDURE — 99284 EMERGENCY DEPT VISIT MOD MDM: CPT

## 2018-03-07 PROCEDURE — 83605 ASSAY OF LACTIC ACID: CPT

## 2018-03-07 PROCEDURE — 81003 URINALYSIS AUTO W/O SCOPE: CPT

## 2018-03-07 PROCEDURE — 84484 ASSAY OF TROPONIN QUANT: CPT

## 2018-03-07 PROCEDURE — 96365 THER/PROPH/DIAG IV INF INIT: CPT

## 2018-03-07 PROCEDURE — 85651 RBC SED RATE NONAUTOMATED: CPT

## 2018-03-07 RX ADMIN — SODIUM CHLORIDE SCH MLS/HR: 9 INJECTION, SOLUTION INTRAVENOUS at 15:04

## 2018-03-07 RX ADMIN — INSULIN ASPART SCH: 100 INJECTION, SOLUTION INTRAVENOUS; SUBCUTANEOUS at 18:06

## 2018-03-07 RX ADMIN — GABAPENTIN SCH MG: 100 CAPSULE ORAL at 03:31

## 2018-03-07 RX ADMIN — INSULIN ASPART SCH UNIT: 100 INJECTION, SOLUTION INTRAVENOUS; SUBCUTANEOUS at 12:52

## 2018-03-07 RX ADMIN — BUDESONIDE SCH: 0.5 SUSPENSION RESPIRATORY (INHALATION) at 03:20

## 2018-03-07 RX ADMIN — OXYCODONE PRN MG: 5 TABLET ORAL at 14:53

## 2018-03-07 RX ADMIN — INSULIN ASPART SCH UNIT: 100 INJECTION, SOLUTION INTRAVENOUS; SUBCUTANEOUS at 09:44

## 2018-03-07 RX ADMIN — FORMOTEROL FUMARATE DIHYDRATE SCH: 20 SOLUTION RESPIRATORY (INHALATION) at 03:20

## 2018-03-07 RX ADMIN — Medication SCH MG: at 09:36

## 2018-03-07 RX ADMIN — OXYCODONE PRN MG: 5 TABLET ORAL at 22:02

## 2018-03-07 RX ADMIN — OXYCODONE PRN MG: 5 TABLET ORAL at 03:31

## 2018-03-07 RX ADMIN — SODIUM CHLORIDE SCH MLS/HR: 9 INJECTION, SOLUTION INTRAVENOUS at 04:41

## 2018-03-07 RX ADMIN — BUDESONIDE SCH MG: 0.5 SUSPENSION RESPIRATORY (INHALATION) at 07:18

## 2018-03-07 RX ADMIN — LATANOPROST SCH DROPS: 50 SOLUTION OPHTHALMIC at 21:31

## 2018-03-07 RX ADMIN — ACETAMINOPHEN PRN MG: 325 TABLET ORAL at 22:55

## 2018-03-07 RX ADMIN — MIRTAZAPINE SCH MG: 15 TABLET, FILM COATED ORAL at 21:32

## 2018-03-07 RX ADMIN — OXYCODONE PRN MG: 5 TABLET ORAL at 09:32

## 2018-03-07 RX ADMIN — IPRATROPIUM BROMIDE AND ALBUTEROL SULFATE PRN ML: 2.5; .5 SOLUTION RESPIRATORY (INHALATION) at 07:18

## 2018-03-07 RX ADMIN — GABAPENTIN SCH MG: 100 CAPSULE ORAL at 21:32

## 2018-03-07 RX ADMIN — BUDESONIDE SCH MG: 0.5 SUSPENSION RESPIRATORY (INHALATION) at 19:15

## 2018-03-07 RX ADMIN — ACETAMINOPHEN PRN MG: 325 TABLET ORAL at 16:34

## 2018-03-07 RX ADMIN — FORMOTEROL FUMARATE DIHYDRATE SCH MCG: 20 SOLUTION RESPIRATORY (INHALATION) at 19:15

## 2018-03-07 RX ADMIN — INSULIN ASPART SCH: 100 INJECTION, SOLUTION INTRAVENOUS; SUBCUTANEOUS at 21:13

## 2018-03-07 RX ADMIN — SODIUM CHLORIDE, PRESERVATIVE FREE SCH: 5 INJECTION INTRAVENOUS at 18:07

## 2018-03-07 RX ADMIN — POLYETHYLENE GLYCOL 3350 SCH GM: 17 POWDER, FOR SOLUTION ORAL at 10:00

## 2018-03-07 RX ADMIN — IPRATROPIUM BROMIDE AND ALBUTEROL SULFATE PRN ML: 2.5; .5 SOLUTION RESPIRATORY (INHALATION) at 19:15

## 2018-03-07 RX ADMIN — CLOPIDOGREL BISULFATE SCH MG: 75 TABLET ORAL at 09:34

## 2018-03-07 RX ADMIN — TAMSULOSIN HYDROCHLORIDE SCH MG: 0.4 CAPSULE ORAL at 21:32

## 2018-03-07 RX ADMIN — FAMOTIDINE SCH MG: 20 TABLET, FILM COATED ORAL at 09:35

## 2018-03-07 RX ADMIN — SODIUM CHLORIDE, PRESERVATIVE FREE SCH: 5 INJECTION INTRAVENOUS at 09:38

## 2018-03-07 RX ADMIN — ENOXAPARIN SODIUM SCH MG: 100 INJECTION SUBCUTANEOUS at 09:42

## 2018-03-07 RX ADMIN — FORMOTEROL FUMARATE DIHYDRATE SCH MCG: 20 SOLUTION RESPIRATORY (INHALATION) at 07:18

## 2018-03-07 RX ADMIN — SODIUM CHLORIDE SCH MLS/HR: 9 INJECTION, SOLUTION INTRAVENOUS at 22:59

## 2018-03-07 RX ADMIN — Medication SCH MG: at 21:32
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
## 2018-03-07 NOTE — CT REPORT
EXAM:

CT ABDOMEN AND PELVIS

 

EXAM DATE: 3/7/2018 02:02 AM.

 

CLINICAL HISTORY: Left lower quadrant pain, history of diverticulitis.

 

COMPARISONS: 07/22/2017.

 

TECHNIQUE: Routine helical CT imaging was performed through the abdomen and pelvis. IV contrast: 100 
mL Isovue 300. Enteric contrast:  No . Reconstructions: Coronal and sagittal.

 

In accordance with CT protocol optimization, one or more of the following dose reduction techniques w
ere utilized for this exam: automated exposure control, adjustment of mA and/or KV based on patient s
ize, or use of iterative reconstructive technique.

 

FINDINGS: 

Lung Bases: Severe coronary calcifications. Mild right lower lobe scarring.

 

Liver: Unremarkable. No suspicious masses.

 

Gallbladder/Bile Ducts: Cholelithiasis, otherwise unremarkable.

 

Spleen: Unremarkable.

 

Pancreas: Unremarkable.

 

Adrenal Glands: Unremarkable.

 

Kidneys: Numerous bilateral nonobstructing renal stones measuring up to 10 mm in the right renal pelv
is. No ureteral stone or hydronephrosis. Small cysts bilaterally.

 

Peritoneal Cavity/Bowel: Extensive colonic diverticulosis. No bowel obstruction or inflammatory proce
ss seen. No free air or significant free fluid. No masses or adenopathy.  No excessive stool burden.

 

Pelvic Organs: Urinary bladder appears inflamed. There is mild edema around the prostate as well. Fol
ey catheter balloon is inflated within the prostatic urethra.  

 

Vasculature: Moderate atherosclerotic disease of the aorta and branches. This includes a severely dis
eased superior mesenteric artery. No aneurysm seen.  

 

Bones: No acute abnormality seen. Bridging syndesmophytes throughout the visualized thoracic spine.

 

Other: Fat containing right inguinal hernia without apparent complication.

 

IMPRESSION: 

1. Cystitis/prostatitis. Favor primary cystitis over secondary cystitis from nonvisualized diverticul
itis. There is severe colonic diverticulosis.

2. Gupta catheter balloon is inflated within the prostate, suggest replacement. 

3. Cholelithiasis. 

4. Moderate atherosclerotic disease of the aorta and branches. This includes a severely diseased supe
rior mesenteric artery.

5. Small hiatal hernia. 

6. Numerous bilateral nonobstructing renal stones.

 

Results discussed with Dr. Live. 

 

KYE

 

Referring Provider Line: [phone]

 

SITE ID: [id number]

## 2018-03-07 NOTE — PROVIDER PROGRESS NOTE
Subjective





- Prog Note Date


Prog Note Date: 03/07/18





- Subjective


Pt reports feeling: Improved


Subjective: 


pt state he feels better, denies dysurine, fever, chill, chest pain, cough, 

Shortness of breath. pt has some kind of confusion as well








Current Medications





- Current Medications


Current Medications: 





Active Medications





Acetaminophen (Tylenol)  650 mg PO Q4HR PRN


   PRN Reason: Pain 1 to 4


Albuterol/Ipratropium (Duoneb)  3 ml INH Q4HR PRN


   PRN Reason: Wheezing


   Last Admin: 03/07/18 07:18 Dose:  3 ml


Budesonide (Pulmicort)  0.5 mg INH RTBID [facility]


   Last Admin: 03/07/18 07:18 Dose:  0.5 mg


Clopidogrel Bisulfate (Plavix)  75 mg PO DAILY [facility]


   Last Admin: 03/07/18 09:34 Dose:  75 mg


Enoxaparin Sodium (Lovenox)  40 mg SUBQ DAILY [facility]


   Last Admin: 03/07/18 09:42 Dose:  40 mg


Famotidine (Pepcid)  20 mg PO DAILY [facility]


   Last Admin: 03/07/18 09:35 Dose:  20 mg


Formoterol Fumarate (Perforomist)  20 mcg INH RTBID [facility]


   Last Admin: 03/07/18 07:18 Dose:  20 mcg


Gabapentin (Neurontin)  200 mg PO HS [facility]


   Last Admin: 03/07/18 03:31 Dose:  200 mg


Guaifenesin (Robitussin Liquid)  200 mg PO Q4H PRN


   PRN Reason: Cough


Sodium Chloride (Normal Saline 0.9%)  1,000 mls @ 120 mls/hr IV .Q8H20M [facility]


   Last Admin: 03/07/18 15:04 Dose:  120 mls/hr


Ceftriaxone Sodium 2 gm/ (Sodium Chloride)  100 mls @ 200 mls/hr IV Q24H [facility]


Vancomycin/Sodium Chloride (Vanco/Sod Chloride 0.9%)  1.5 gm in 500 mls @ 250 

mls/hr IV Q24H [facility]


Insulin Aspart (Novolog)  1 - 5 unit SUBQ 0800,1200,1700,2100 DON


   PRN Reason: Protocol


   Last Admin: 03/07/18 12:52 Dose:  1 unit


Latanoprost (Xalatan Ophth Drops)  0 drops EACHEYE QPM [facility]


Mirtazapine (Remeron)  15 mg PO QPM [facility]


Ondansetron HCl (Zofran Odt)  4 mg TL Q6HR PRN


   PRN Reason: Nausea / Vomiting


Oxycodone HCl (Roxicodone)  5 mg PO Q4HR PRN


   PRN Reason: Pain 5 to 7


   Last Admin: 03/07/18 14:53 Dose:  5 mg


Polyethylene Glycol (Miralax)  17 gm PO DAILY [facility]


   Last Admin: 03/07/18 10:00 Dose:  17 gm


Prochlorperazine Edisylate (Compazine Inj)  10 mg IVP Q6HR PRN


   PRN Reason: Nausea / Vomiting


Saccharomyces Boulardii (Florastor)  250 mg PO BID [facility]


   Last Admin: 03/07/18 09:36 Dose:  250 mg


Sodium Chloride (Normal Saline Flush 0.9%)  10 ml IVP PRN PRN


   PRN Reason: AS NEEDED PER PROVIDER ORDERS


Sodium Chloride (Normal Saline Flush 0.9%)  10 ml IVP 0100,0900,1700 [facility]


   Last Admin: 03/07/18 09:38 Dose:  Not Given


Tamsulosin HCl (Flomax)  0.4 mg PO QPM [facility]





 





Acetaminophen 650 mg PO Q6H PRN 11/26/16 


Fluticasone/Salmeterol [Advair 100-50 Diskus] 1 inh INH BID 11/26/16 


Ipratropium/Albuterol [Duoneb] 3 ml INH Q6H PRN 07/22/17 


Mirtazapine [Remeron] 15 mg PO QPM 07/22/17 


Tamsulosin HCl [Flomax] 0.4 mg PO QPM 07/22/17 


Guaifenesin [Child Mucinex Chest Congestion] 10 ml PO Q4H PRN 11/10/17 


Cholecalciferol [Vitamin D3] 5,000 unit PO .EVERY FRIDAY 01/25/18 


Saccharomyces Boulardii [Florastor] 1 tab PO BID 01/25/18 


Senna [Senokot] 1 tab PO DAILY 01/26/18 


Gabapentin 200 mg PO .QHS 02/26/18 











Objective





- Vital Signs/Intake & Output


Reviewed Vital Signs: Yes


Vital Signs: 


 Vital Signs x48h











  Temp Pulse Resp BP Pulse Ox


 


 03/07/18 14:00  37.7 C H  102 H  20  110/49 L  96


 


 03/07/18 07:51  37.5 C  105 H  20  127/52 L  95











Intake & Output: 


 Intake & Output











 03/04/18 03/05/18 03/06/18 03/07/18





 23:59 23:59 23:59 23:59


 


Intake Total    2600


 


Output Total    200


 


Balance    2400














- Objective


General Appearance: positive: No acute distress, Alert.  negative: Lethargic


Eyes Bilateral: positive: Normal inspection, PERRL, No lid inflammation, 

Conjunctivae nml


ENT: positive: ENT inspection nml, Pharynx nml, No signs of dehydration.  

negative: Purulent nasal drainage, Pharyngeal erythema, Oral lesions


Neck: positive: Nml inspection, Thyroid nml, No JVD, Trachea midline.  negative

: Thyromegaly, Lymphadenopathy (R), Lymphadenopathy (L), Stiff neck, Carotid 

bruit, Swelling/bruising, Tracheal deviation


Respiratory: positive: Chest non-tender, No respiratory distress, Breath sounds 

nml.  negative: Wheezes, Rales, Rhonchi


Cardiovascular: positive: Regular rate & rhythm, No murmur, No gallop.  negative

: Irregularly irregular, Extrasystoles, Tachycardia, Bradycardia, Systolic 

murmur, Diastolic murmur


Peripheral Pulses: 2+ Radial (R), 2+ Radial (L), 2+ Dorsalis pedis (R), 2+ 

Dorsalis pedis (L)


Abdomen: positive: Non-tender, No organomegaly, Nml bowel sounds, No 

distention.  negative: Tenderness, Guarding, Rebound


Back: positive: Nml inspection.  negative: CVA tenderness (R), CVA tenderness (L

)


Skin: positive: Color nml, No rash, Warm, Dry.  negative: Cyanosis, Diaphoresis

, Pallor


Extremities: positive: Non-tender, Full ROM, Nml appearance.  negative: Calf 

tenderness, Joint swelling, Glenn's sign/cords


Neurologic/Psychiatric: positive: Sensation nml, Mood/affect nml.  negative: 

Sensory loss, Facial droop, Slurred/abnml speech, Depressed mood/affect





- Lab Results


Fish Bones: 


 03/07/18 06:00





 03/07/18 06:00


Other Labs: 


 Lab Results x24hrs











  03/07/18 03/07/18 03/07/18 Range/Units





  06:00 06:00 06:00 


 


WBC    14.4 H  (4.8-10.8)  x10^3/uL


 


RBC    3.35 L  (4.70-6.10)  10^6/uL


 


Hgb    10.4 L  (14.0-18.0)  g/dL


 


Hct    31.6 L  (42.0-52.0)  %


 


MCV    94.2 H  (80.0-94.0)  fL


 


MCH    31.1 H  (27.0-31.0)  pg


 


MCHC    33.0  (32.0-36.0)  g/dL


 


RDW    13.4  (12.0-15.0)  %


 


Plt Count    199  (130-450)  10^3/uL


 


MPV    8.2  (7.4-11.4)  fL


 


Neut #    11.8 H  (1.5-6.6)  10^3/uL


 


Lymph #    1.4 L  (1.5-3.5)  10^3/uL


 


Mono #    1.1 H  (0.0-1.0)  10^3/uL


 


Eos #    0.1  (0.0-0.7)  10^3/uL


 


Baso #    0.0  (0.0-0.1)  10^3/uL


 


Absolute Nucleated RBC    0.00  x10^3/uL


 


Nucleated RBC %    0.0  /100WBC


 


Sodium   133 L   (135-145)  mmol/L


 


Potassium   3.8   (3.5-5.0)  mmol/L


 


Chloride   97 L   (101-111)  mmol/L


 


Carbon Dioxide   28   (21-32)  mmol/L


 


Anion Gap   8.0   (6-13)  


 


BUN   12   (6-20)  mg/dL


 


Creatinine   0.7   (0.6-1.2)  mg/dL


 


Estimated GFR (MDRD)   107   (>89)  


 


Glucose   162 H   ()  mg/dL


 


Glycated Hemoglobin  6.3 H    (4.6-6.2)  %


 


Estim Average Glucose  134 H    ()  


 


Calcium   7.9 L   (8.5-10.3)  mg/dL


 


Total Bilirubin   1.2 H   (0.2-1.0)  mg/dL


 


AST   14   (10-42)  IU/L


 


ALT   11   (10-60)  IU/L


 


Alkaline Phosphatase   107   ()  IU/L


 


Total Protein   6.1 L   (6.7-8.2)  g/dL


 


Albumin   2.8 L   (3.2-5.5)  g/dL


 


Globulin   3.3   (2.1-4.2)  g/dL


 


Albumin/Globulin Ratio   0.8 L   (1.0-2.2)  














Assessment/Plan





- Problem List


(1) Sepsis


Impression: 


(1) Sepsis


Conclusion/Plan: 


pt has fever, review of CXR and CT of abdomen


continue Rocephin and Vancomycin


follow up blood and urine culture


continue IVF


vital, lab continue monitor








Patient has previous history of septic shock during presentation in September 2017 secondary to urinary tract infection.  Today the patient presented with 

fever at [facility] however temperature was only 37.1 on arrival, 

tachycardia with heart rate in the 110s, patient was tachypneic with 

respiratory rate greater than 20 and patient had leukocytosis of greater than 15

,000.  The patient appeared to be quite dry and was hypotensive both at 

carriage and again in the emergency department here.  Source of patient's 

infection appeared to be a urinary tract infection likely due to a indwelling 

Gupta catheter.  Patient did have symptoms of dysuria and fever.  The patient's 

UA was grossly positive and urine in the catheter looked cloudy.





Plan:


Patient will be continued on IV fluids he has been given 2 L in the emergency 

department and will be given an additional 1 L bolus and then placed on 

maintenance fluid


We will monitor patient's blood pressure every 4 hours


Patient will be given IV ceftriaxone and vancomycin given his previous urine 

cultures and we will await his urine culture and blood cultures


We will remove patient's Gupta catheter and replaced with new catheter if 

needed.





(2) UTI (urinary tract infection)


Conclusion/Plan: 


continue antibiotics


pt's Gupta was removed, pt has urination, check bladder scan, will follow up


follow up UA culture 





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 

including most recent episode in September 2017 requiring intensive care with 

septic shock.  Today patient appears to be presenting with early sepsis and is 

again found to have a urinary tract infection.  Patient did present with 

symptoms of dysuria and fever.  Patient's urine analysis was grossly positive.  

The patient's Gupta catheter had cloudy urine and it was unclear as to when it 

was last changed.





Plan:


Patient is being started on IV vancomycin and ceftriaxone as patient's previous 

urine cultures grew staph aureus and Enterobacter cloacae which were 

susceptible to vancomycin and ceftriaxone


Urine cultures have been collected and will be followed up


Patient's Gupta catheter has been removed in the emergency department and after 

removal patient was able to urinate therefore we will leave the Gupta catheter 

out and allow for urinary tract infection to be treated.


We will check bladder scans to see if patient does require urinary catheter in 

the future.








(3) COPD (chronic obstructive pulmonary disease)


Conclusion/Plan: 


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


continue Oxygen supplement


continue INH treatment


vital monitor





Patient has history of COPD and is on 2.5 L of oxygen at home.  Patient has end-

stage COPD and is being followed by palliative care at [facility].  He 

is on duo nebs and Advair at home with which the patient has been stable.





Plan:


Patient will be placed on duo nebs as needed along with budesonide and 

formoterol twice a day


We will continue his home dose of oxygen








(4) BPH (benign prostatic hyperplasia)


Conclusion/Plan: 


continue Flomax


check bladder scan, if pt need Gupta in the future





The patient has history of BPH and has an indwelling Gupta catheter.  The 

patient is also on Flomax at home.


Given the patient's recurrent urinary tract infections and ongoing urinary 

tract infection the patient's current Gupta catheter was removed.  Patient was 

able to void after having the catheter removed therefore a new catheter has not 

yet been placed.  We will monitor to see if patient still needs a Gupta 

catheter.  The patient will get bladder scans throughout the day and if 

residuals are high or he has discomfort we may need to place a new Gupta 

catheter.  The patient will be continued on his home dose of Flomax.








(5) Diabetes


Conclusion/Plan: 


pt's A1C 6.3, is in the good control





Patient has a history of type 2 diabetes but is not currently on any 

medications.  The patient is diet controlled.  The patient's blood glucose on 

presentation the emergency department is in the 140s which is only mildly 

elevated.  Given the patient's urinary tract infection we will try to tightly 

control his blood glucose therefore the patient will be placed on sliding scale 

insulin and we will check a hemoglobin A1c.





(6) Hyponatremia


Conclusion/Plan: 


Patient has a sodium of 133 on presentation.  The patient does appear to be 

quite dry this appears to be hypovolemic hyponatremia.  The patient has low 

blood pressures therefore he will be given IV fluids and we will continue to 

monitor his sodium

## 2018-03-07 NOTE — CT PRELIMINARY REPORT
Accession: [id number]

Exam: CT ABDOMEN/PELVIS W/

 

IMPRESSION: 

1. Cystitis/prostatitis. Favor primary cystitis over secondary cystitis from nonvisualized diverticul
itis. There is severe colonic diverticulosis.

2. Gupta catheter balloon is inflated within the prostate, suggest replacement. 

3. Cholelithiasis. 

4. Moderate atherosclerotic disease of the aorta and branches. This includes a severely diseased supe
rior mesenteric artery.

5. Small hiatal hernia.

 

Results discussed with Dr. Live. 

 

[facility]

 

 

SITE ID: [id number]

## 2018-03-07 NOTE — XRAY REPORT
EXAM: 

CHEST RADIOGRAPHY

 

EXAM DATE: 3/7/2018 02:03 AM.

 

CLINICAL HISTORY: Fever, hypotension.

 

COMPARISON: 09/18/2017, CT abdomen same day.

 

TECHNIQUE: 1 view.

 

FINDINGS:

Lungs/Pleura: No focal opacities evident. No pleural effusion. No pneumothorax.

 

Mediastinum: Within exam limitations, the cardiomediastinal contour is normal.

 

Other: None.

 

IMPRESSION: 

Negative single view chest.

 

[facility]

Referring Provider Line: [phone]

 

SITE ID: [id number]

## 2018-03-07 NOTE — ED PHYSICIAN DOCUMENTATION
History of Present Illness





- Stated complaint


Stated Complaint: HYPOTENSION, WARM TO TOUCH





- Chief complaint


Chief Complaint: General





- History obtained from


History obtained from: Patient, EMS





- History of Present Illness


Timing: Today





- Additonal information


Additional information: 





Patient is an 84 year old male with multiple co-morbidities who is presenting 

to the emergency department for hypotension and fever.  According to ems the 

nursing home staff stated that his blood pressures were in the 70s, and that he 

felt warm.  For ems patient had systolics in the 80s.  Patient does have an 

indwelling catheter in and complained of penile pain.  





Review of Systems


Constitutional: reports: Fever, Chills


Eyes: reports: Reviewed and negative


Ears: reports: Reviewed and negative


Nose: denies: Congestion


Throat: denies: Sore throat


Respiratory: denies: Cough, Wheezing


GI: reports: Abdominal Pain, Nausea.  denies: Vomiting, Diarrhea


Skin: denies: Rash, Lesions


Neurologic: reports: Generalized weakness


Immunocompromised: denies: Immunocompromised





PD PAST MEDICAL HISTORY





- Past Medical History


Cardiovascular: Congestive heart failure, Hypertension


Respiratory: COPD


Neuro: Dementia


Endocrine/Autoimmune: Type 2 diabetes


GI: None


: Renal insuffiency


HEENT: Chronic hearing loss


Psych: None


Musculoskeletal: Chronic back pain


Derm: None





- Past Surgical History


Past Surgical History: Yes


General: Colonoscopy


HEENT: Cataracts





- Present Medications


Home Medications: 


 Ambulatory Orders











 Medication  Instructions  Recorded  Confirmed


 


Acetaminophen 650 mg PO Q6H PRN 11/26/16 03/07/18


 


Fluticasone/Salmeterol [Advair 1 inh INH BID 11/26/16 02/26/18





100-50 Diskus]   


 


Clopidogrel [Plavix] 75 mg PO DAILY 30 Days  tablet 01/18/17 03/07/18


 


Ipratropium/Albuterol [Duoneb] 3 ml INH Q6H PRN 07/22/17 02/26/18


 


Mirtazapine [Remeron] 15 mg PO QPM 07/22/17 03/07/18


 


Tamsulosin HCl [Flomax] 0.4 mg PO QPM 07/22/17 03/07/18


 


Latanoprost 0.005% Ophth Drops 1 drops EACHEYE QPM #1 bottle 07/25/17 02/26/18





[Xalatan Ophth Drops]   


 


Calcium Carbonate [Tums (Calcium 500 mg PO Q8HR PRN #30 tablet 07/27/17 02/26/18





Carbonate 500mg)]   


 


Guaifenesin [Child Mucinex Chest 10 ml PO Q4H PRN 11/10/17 02/26/18





Congestion]   


 


Cholecalciferol [Vitamin D3] 5,000 unit PO .EVERY FRIDAY 01/25/18 02/26/18


 


Saccharomyces Boulardii [Florastor] 1 tab PO BID 01/25/18 02/26/18


 


Senna [Senokot] 1 tab PO DAILY 01/26/18 02/26/18


 


Gabapentin 200 mg PO .QHS 02/26/18 03/07/18














- Allergies


Allergies/Adverse Reactions: 


 Allergies











Allergy/AdvReac Type Severity Reaction Status Date / Time


 


No Known Drug Allergies Allergy   Verified 01/16/18 00:07














- Social History


Does the pt smoke?: No


Smoking Status: Never smoker


Does the pt drink ETOH?: No


Does the pt have substance abuse?: No





- Immunizations


Immunizations are current?: No


Immunizations: TDAP >10years/unknown





- POLST


Patient has POLST: Yes


POLST Status: DNR





PD ED PE NORMAL





- Vitals


Vital signs reviewed: Yes





- General


General: Alert and oriented X 3





- HEENT


HEENT: Atraumatic





- Neuro


Neuro: Alert and oriented X 3, No motor deficit, Normal speech


Eye Opening: Spontaneous


Motor: Obeys Commands


Verbal: Oriented


GCS Score: 15





PD ED PE EXPANDED





- General


General: Alert





- HEENT


HEENT: Dry mucous membranes





- Cardiac


Cardiac: Tachy





- Abdomen


Abdomen: Tender to palpation, Suprapubic, LLQ





- Male 


Male : Other (penile pain and discharge )





- Extremities


Extremities: Pedal edema bilateral





Results





- Vitals


Vitals: 


 Vital Signs - 24 hr











  03/07/18 03/07/18





  00:12 01:18


 


Temperature 37.1 C 


 


Heart Rate 110 H 107 H


 


Respiratory 20 28 H





Rate  


 


Blood Pressure 113/65 97/58 L


 


O2 Saturation 97 98








 Oxygen











O2 Source [With Activity]      Room air


 


O2 Source [Without Activity]   2L via NC


 


O2 Source                      Nasal cannula


 


Oxygen Flow Rate               2

















- Labs


Labs: 


 Laboratory Tests











  03/07/18 03/07/18 03/07/18





  00:32 00:35 00:35


 


WBC   15.4 H 


 


RBC   3.46 L 


 


Hgb   11.0 L 


 


Hct   32.4 L 


 


MCV   93.8 


 


MCH   32.0 H 


 


MCHC   34.0 


 


RDW   13.2 


 


Plt Count   217 


 


MPV   8.0 


 


Neut #   12.0 H 


 


Lymph #   1.8 


 


Mono #   1.3 H 


 


Eos #   0.1 


 


Baso #   0.1 


 


Absolute Nucleated RBC   0.00 


 


Nucleated RBC %   0.0 


 


Sodium    133 L


 


Potassium    3.6


 


Chloride    96 L


 


Carbon Dioxide    30


 


Anion Gap    7.0


 


BUN    15


 


Creatinine    0.9


 


Estimated GFR (MDRD)    80 L


 


Glucose    143 H


 


Lactic Acid   


 


Calcium    8.0 L


 


Total Bilirubin    1.5 H


 


AST    17


 


ALT    < 10 L


 


Alkaline Phosphatase    112


 


Troponin I   


 


B-Natriuretic Peptide   


 


Total Protein    6.0 L


 


Albumin    3.0 L


 


Globulin    3.0


 


Albumin/Globulin Ratio    1.0


 


Lipase    < 10 L


 


Urine Color   


 


Urine Clarity   


 


Urine pH   


 


Ur Specific Gravity   


 


Urine Protein   


 


Urine Glucose (UA)   


 


Urine Ketones   


 


Urine Occult Blood   


 


Urine Nitrite   


 


Urine Bilirubin   


 


Urine Urobilinogen   


 


Ur Leukocyte Esterase   


 


Urine RBC   


 


Urine WBC   


 


Ur Squamous Epith Cells   


 


Urine Crystals   


 


Urine Bacteria   


 


Ur Microscopic Review   


 


Urine Culture Comments   


 


Influenza A (Rapid)  Negative  


 


Influenza B (Rapid)  Negative  


 


Influenza Types A,B Ag  -  














  03/07/18 03/07/18 03/07/18





  00:35 00:35 00:35


 


WBC   


 


RBC   


 


Hgb   


 


Hct   


 


MCV   


 


MCH   


 


MCHC   


 


RDW   


 


Plt Count   


 


MPV   


 


Neut #   


 


Lymph #   


 


Mono #   


 


Eos #   


 


Baso #   


 


Absolute Nucleated RBC   


 


Nucleated RBC %   


 


Sodium   


 


Potassium   


 


Chloride   


 


Carbon Dioxide   


 


Anion Gap   


 


BUN   


 


Creatinine   


 


Estimated GFR (MDRD)   


 


Glucose   


 


Lactic Acid    0.9


 


Calcium   


 


Total Bilirubin   


 


AST   


 


ALT   


 


Alkaline Phosphatase   


 


Troponin I  < 0.04  


 


B-Natriuretic Peptide   36 


 


Total Protein   


 


Albumin   


 


Globulin   


 


Albumin/Globulin Ratio   


 


Lipase   


 


Urine Color   


 


Urine Clarity   


 


Urine pH   


 


Ur Specific Gravity   


 


Urine Protein   


 


Urine Glucose (UA)   


 


Urine Ketones   


 


Urine Occult Blood   


 


Urine Nitrite   


 


Urine Bilirubin   


 


Urine Urobilinogen   


 


Ur Leukocyte Esterase   


 


Urine RBC   


 


Urine WBC   


 


Ur Squamous Epith Cells   


 


Urine Crystals   


 


Urine Bacteria   


 


Ur Microscopic Review   


 


Urine Culture Comments   


 


Influenza A (Rapid)   


 


Influenza B (Rapid)   


 


Influenza Types A,B Ag   














  03/07/18





  00:50


 


WBC 


 


RBC 


 


Hgb 


 


Hct 


 


MCV 


 


MCH 


 


MCHC 


 


RDW 


 


Plt Count 


 


MPV 


 


Neut # 


 


Lymph # 


 


Mono # 


 


Eos # 


 


Baso # 


 


Absolute Nucleated RBC 


 


Nucleated RBC % 


 


Sodium 


 


Potassium 


 


Chloride 


 


Carbon Dioxide 


 


Anion Gap 


 


BUN 


 


Creatinine 


 


Estimated GFR (MDRD) 


 


Glucose 


 


Lactic Acid 


 


Calcium 


 


Total Bilirubin 


 


AST 


 


ALT 


 


Alkaline Phosphatase 


 


Troponin I 


 


B-Natriuretic Peptide 


 


Total Protein 


 


Albumin 


 


Globulin 


 


Albumin/Globulin Ratio 


 


Lipase 


 


Urine Color  YELLOW


 


Urine Clarity  CLOUDY


 


Urine pH  >=9.0 H


 


Ur Specific Gravity  1.010


 


Urine Protein  100 H


 


Urine Glucose (UA)  NEGATIVE


 


Urine Ketones  NEGATIVE


 


Urine Occult Blood  MODERATE H


 


Urine Nitrite  POSITIVE H


 


Urine Bilirubin  NEGATIVE


 


Urine Urobilinogen  0.2 (NORMAL)


 


Ur Leukocyte Esterase  LARGE H


 


Urine RBC  6-10 H


 


Urine WBC  11-25 H


 


Ur Squamous Epith Cells  NONE SEEN


 


Urine Crystals  6-10 Triple Phos


 


Urine Bacteria  Many H


 


Ur Microscopic Review  INDICATED


 


Urine Culture Comments  INDICATED


 


Influenza A (Rapid) 


 


Influenza B (Rapid) 


 


Influenza Types A,B Ag 














- Rads (name of study)


  ** ct abdomen and pelvis


Radiology: Final report received (finding ), Discussed with rads (aldrich balloon 

in prostate, cystitis, diverticular disease)





PD MEDICAL DECISION MAKING





- ED course


Complexity details: reviewed old records, reviewed results, re-evaluated patient

, considered differential, d/w patient, d/w consultant


ED course: 





Patient was seen and examined at bedside.  IV access was gained and labs were 

drawn.  Patient was treated with fluid bolus.  urine was collected as well as 

blood cultures.  Due to the history of diverticulits and the llq pain CT was 

ordered.  Patient did have a urinary tract infection and was started on 

rocephin after looking at previous culture results.  Hospitalist was contacted 

and the case was discussed with him.  Patient was admitted for further 

evaluation and care.  





Departure





- Departure


Disposition: [facility] DC/Xfer


Clinical Impression: 


 SIRS (systemic inflammatory response syndrome)





UTI (urinary tract infection)


Qualifiers:


 Urinary tract infection type: catheter-associated UTI Indwelling urinary 

catheter type: indwelling urethral catheter 





Condition: Stable

## 2018-03-08 LAB
ALBUMIN DIAFP-MCNC: 2.6 G/DL (ref 3.2–5.5)
ALBUMIN/GLOB SERPL: 0.9 {RATIO} (ref 1–2.2)
ALP SERPL-CCNC: 91 IU/L (ref 42–121)
ALT SERPL W P-5'-P-CCNC: < 10 IU/L (ref 10–60)
ANION GAP SERPL CALCULATED.4IONS-SCNC: 8 MMOL/L (ref 6–13)
AST SERPL W P-5'-P-CCNC: 10 IU/L (ref 10–42)
BASOPHILS NFR BLD AUTO: 0 10^3/UL (ref 0–0.1)
BASOPHILS NFR BLD AUTO: 0.2 %
BILIRUB BLD-MCNC: 0.7 MG/DL (ref 0.2–1)
BUN SERPL-MCNC: 11 MG/DL (ref 6–20)
CALCIUM UR-MCNC: 7.8 MG/DL (ref 8.5–10.3)
CHLORIDE SERPL-SCNC: 103 MMOL/L (ref 101–111)
CO2 SERPL-SCNC: 26 MMOL/L (ref 21–32)
CREAT SERPLBLD-SCNC: 0.8 MG/DL (ref 0.6–1.2)
EOSINOPHIL # BLD AUTO: 0.3 10^3/UL (ref 0–0.7)
EOSINOPHIL NFR BLD AUTO: 2.6 %
ERYTHROCYTE [DISTWIDTH] IN BLOOD BY AUTOMATED COUNT: 13.6 % (ref 12–15)
GFRSERPLBLD MDRD-ARVRAT: 92 ML/MIN/{1.73_M2} (ref 89–?)
GLOBULIN SER-MCNC: 3 G/DL (ref 2.1–4.2)
GLUCOSE SERPL-MCNC: 116 MG/DL (ref 70–100)
HGB UR QL STRIP: 9.7 G/DL (ref 14–18)
LYMPHOCYTES # SPEC AUTO: 1.2 10^3/UL (ref 1.5–3.5)
LYMPHOCYTES NFR BLD AUTO: 10.4 %
MAGNESIUM SERPL-MCNC: 1.6 MG/DL (ref 1.7–2.8)
MCH RBC QN AUTO: 30.9 PG (ref 27–31)
MCHC RBC AUTO-ENTMCNC: 32.4 G/DL (ref 32–36)
MCV RBC AUTO: 95.4 FL (ref 80–94)
MONOCYTES # BLD AUTO: 0.9 10^3/UL (ref 0–1)
MONOCYTES NFR BLD AUTO: 7.6 %
NEUTROPHILS # BLD AUTO: 8.9 10^3/UL (ref 1.5–6.6)
NEUTROPHILS # SNV AUTO: 11.3 X10^3/UL (ref 4.8–10.8)
NEUTROPHILS NFR BLD AUTO: 79.2 %
PDW BLD AUTO: 8.2 FL (ref 7.4–11.4)
PLATELET # BLD: 193 10^3/UL (ref 130–450)
PROT SPEC-MCNC: 5.6 G/DL (ref 6.7–8.2)
RBC MAR: 3.14 10^6/UL (ref 4.7–6.1)
SODIUM SERPLBLD-SCNC: 137 MMOL/L (ref 135–145)

## 2018-03-08 RX ADMIN — ACETAMINOPHEN PRN MG: 325 TABLET ORAL at 15:52

## 2018-03-08 RX ADMIN — CLOPIDOGREL BISULFATE SCH MG: 75 TABLET ORAL at 08:15

## 2018-03-08 RX ADMIN — LATANOPROST SCH DROPS: 50 SOLUTION OPHTHALMIC at 21:23

## 2018-03-08 RX ADMIN — OXYCODONE PRN MG: 5 TABLET ORAL at 11:05

## 2018-03-08 RX ADMIN — GUAIFENESIN PRN MG: 100 SOLUTION ORAL at 11:05

## 2018-03-08 RX ADMIN — MIRTAZAPINE SCH MG: 15 TABLET, FILM COATED ORAL at 21:18

## 2018-03-08 RX ADMIN — Medication SCH MG: at 21:18

## 2018-03-08 RX ADMIN — FAMOTIDINE SCH MG: 20 TABLET, FILM COATED ORAL at 08:15

## 2018-03-08 RX ADMIN — INSULIN ASPART SCH: 100 INJECTION, SOLUTION INTRAVENOUS; SUBCUTANEOUS at 21:35

## 2018-03-08 RX ADMIN — SODIUM CHLORIDE, PRESERVATIVE FREE SCH: 5 INJECTION INTRAVENOUS at 08:16

## 2018-03-08 RX ADMIN — FORMOTEROL FUMARATE DIHYDRATE SCH MCG: 20 SOLUTION RESPIRATORY (INHALATION) at 16:14

## 2018-03-08 RX ADMIN — ENOXAPARIN SODIUM SCH MG: 100 INJECTION SUBCUTANEOUS at 08:15

## 2018-03-08 RX ADMIN — SODIUM CHLORIDE SCH MLS/HR: 9 INJECTION, SOLUTION INTRAVENOUS at 20:34

## 2018-03-08 RX ADMIN — FORMOTEROL FUMARATE DIHYDRATE SCH MCG: 20 SOLUTION RESPIRATORY (INHALATION) at 07:20

## 2018-03-08 RX ADMIN — SODIUM CHLORIDE, PRESERVATIVE FREE SCH: 5 INJECTION INTRAVENOUS at 18:19

## 2018-03-08 RX ADMIN — OXYCODONE PRN MG: 5 TABLET ORAL at 05:28

## 2018-03-08 RX ADMIN — SODIUM CHLORIDE SCH MLS/HR: 9 INJECTION, SOLUTION INTRAVENOUS at 07:59

## 2018-03-08 RX ADMIN — Medication SCH MG: at 08:15

## 2018-03-08 RX ADMIN — BUDESONIDE SCH MG: 0.5 SUSPENSION RESPIRATORY (INHALATION) at 16:14

## 2018-03-08 RX ADMIN — GABAPENTIN SCH MG: 100 CAPSULE ORAL at 21:18

## 2018-03-08 RX ADMIN — INSULIN ASPART SCH: 100 INJECTION, SOLUTION INTRAVENOUS; SUBCUTANEOUS at 08:14

## 2018-03-08 RX ADMIN — INSULIN ASPART SCH: 100 INJECTION, SOLUTION INTRAVENOUS; SUBCUTANEOUS at 12:27

## 2018-03-08 RX ADMIN — TAMSULOSIN HYDROCHLORIDE SCH MG: 0.4 CAPSULE ORAL at 21:18

## 2018-03-08 RX ADMIN — IPRATROPIUM BROMIDE AND ALBUTEROL SULFATE PRN ML: 2.5; .5 SOLUTION RESPIRATORY (INHALATION) at 07:19

## 2018-03-08 RX ADMIN — POLYETHYLENE GLYCOL 3350 SCH GM: 17 POWDER, FOR SOLUTION ORAL at 08:15

## 2018-03-08 RX ADMIN — SODIUM CHLORIDE, PRESERVATIVE FREE SCH ML: 5 INJECTION INTRAVENOUS at 01:23

## 2018-03-08 RX ADMIN — BUDESONIDE SCH MG: 0.5 SUSPENSION RESPIRATORY (INHALATION) at 07:20

## 2018-03-08 RX ADMIN — SODIUM CHLORIDE SCH MLS/HR: 900 INJECTION INTRAVENOUS at 01:17

## 2018-03-08 RX ADMIN — INSULIN ASPART SCH: 100 INJECTION, SOLUTION INTRAVENOUS; SUBCUTANEOUS at 18:19

## 2018-03-08 RX ADMIN — IPRATROPIUM BROMIDE AND ALBUTEROL SULFATE PRN ML: 2.5; .5 SOLUTION RESPIRATORY (INHALATION) at 16:14

## 2018-03-08 NOTE — PROVIDER PROGRESS NOTE
Subjective





- Prog Note Date


Prog Note Date: 03/08/18





- Subjective


Pt reports feeling: No change


Subjective: 


pt confused as his baseline. No fever reported on last night. No chest pain and 

shortness of breath, cough reported.








Current Medications





- Current Medications


Current Medications: 





Active Medications





Acetaminophen (Tylenol)  650 mg PO Q4HR PRN


   PRN Reason: Pain 1 to 4


   Last Admin: 03/07/18 22:55 Dose:  650 mg


Albuterol/Ipratropium (Duoneb)  3 ml INH Q4HR PRN


   PRN Reason: Wheezing


   Last Admin: 03/08/18 07:19 Dose:  3 ml


Budesonide (Pulmicort)  0.5 mg INH RTBID [facility]


   Last Admin: 03/08/18 07:20 Dose:  0.5 mg


Clopidogrel Bisulfate (Plavix)  75 mg PO DAILY [facility]


   Last Admin: 03/08/18 08:15 Dose:  75 mg


Enoxaparin Sodium (Lovenox)  40 mg SUBQ DAILY [facility]


   Last Admin: 03/08/18 08:15 Dose:  40 mg


Famotidine (Pepcid)  20 mg PO DAILY [facility]


   Last Admin: 03/08/18 08:15 Dose:  20 mg


Formoterol Fumarate (Perforomist)  20 mcg INH RTBID [facility]


   Last Admin: 03/08/18 07:20 Dose:  20 mcg


Gabapentin (Neurontin)  200 mg PO HS [facility]


   Last Admin: 03/07/18 21:32 Dose:  200 mg


Guaifenesin (Robitussin Liquid)  200 mg PO Q4H PRN


   PRN Reason: Cough


   Last Admin: 03/08/18 11:05 Dose:  200 mg


Sodium Chloride (Normal Saline 0.9%)  1,000 mls @ 120 mls/hr IV .Q8H20M [facility]


   Last Admin: 03/08/18 07:59 Dose:  120 mls/hr


Ceftriaxone Sodium 2 gm/ (Sodium Chloride)  100 mls @ 200 mls/hr IV Q24H [facility]


   Last Infusion: 03/08/18 07:54 Dose:  Infused


Vancomycin/Sodium Chloride (Vanco/Sod Chloride 0.9%)  1.5 gm in 500 mls @ 250 

mls/hr IV Q24H [facility]


   Last Admin: 03/08/18 10:26 Dose:  250 mls/hr


Insulin Aspart (Novolog)  1 - 5 unit SUBQ 0800,1200,1700,2100 [facility]


   PRN Reason: Protocol


   Last Admin: 03/08/18 08:14 Dose:  Not Given


Latanoprost (Xalatan Ophth Drops)  0 drops EACHEYE QPM [facility]


   Last Admin: 03/07/18 21:31 Dose:  1 drops


Mirtazapine (Remeron)  15 mg PO QPM [facility]


   Last Admin: 03/07/18 21:32 Dose:  15 mg


Ondansetron HCl (Zofran Odt)  4 mg TL Q6HR PRN


   PRN Reason: Nausea / Vomiting


Oxycodone HCl (Roxicodone)  5 mg PO Q4HR PRN


   PRN Reason: Pain 5 to 7


   Last Admin: 03/08/18 11:05 Dose:  5 mg


Polyethylene Glycol (Miralax)  17 gm PO DAILY [facility]


   Last Admin: 03/08/18 08:15 Dose:  17 gm


Prochlorperazine Edisylate (Compazine Inj)  10 mg IVP Q6HR PRN


   PRN Reason: Nausea / Vomiting


Saccharomyces Boulardii (Florastor)  250 mg PO BID [facility]


   Last Admin: 03/08/18 08:15 Dose:  250 mg


Sodium Chloride (Normal Saline Flush 0.9%)  10 ml IVP PRN PRN


   PRN Reason: AS NEEDED PER PROVIDER ORDERS


Sodium Chloride (Normal Saline Flush 0.9%)  10 ml IVP 0100,0900,1700 [facility]


   Last Admin: 03/08/18 08:16 Dose:  Not Given


Tamsulosin HCl (Flomax)  0.4 mg PO QPM [facility]


   Last Admin: 03/07/18 21:32 Dose:  0.4 mg





 





Acetaminophen 650 mg PO Q6H PRN 11/26/16 


Fluticasone/Salmeterol [Advair 100-50 Diskus] 1 inh INH BID 11/26/16 


Ipratropium/Albuterol [Duoneb] 3 ml INH Q6H PRN 07/22/17 


Mirtazapine [Remeron] 15 mg PO QPM 07/22/17 


Tamsulosin HCl [Flomax] 0.4 mg PO QPM@1800 07/22/17 


Guaifenesin [Child Mucinex Chest Congestion] 10 ml PO Q4H PRN 11/10/17 


Cholecalciferol [Vitamin D3] 5,000 unit PO FR@0800 01/25/18 


Senna [Senokot] 1 tab PO DAILY@1200 01/26/18 


Gabapentin 200 mg PO QPM 02/26/18 


Gabapentin [Neurontin] 200 mg PO 0700,1400 03/07/18 











Objective





- Vital Signs/Intake & Output


Reviewed Vital Signs: Yes


Vital Signs: 


 Vital Signs x48h











  Temp Pulse Pulse Resp BP Pulse Ox


 


 03/08/18 09:29  37.6 C H   114 H  19  104/43 L  93


 


 03/08/18 07:20   94   16  


 


 03/08/18 04:18  37.2 C   96  18  110/51 L  95











Intake & Output: 


 Intake & Output











 03/05/18 03/06/18 03/07/18 03/08/18





 23:59 23:59 23:59 23:59


 


Intake Total   3650 1350


 


Output Total   200 


 


Balance   3450 1350














- Objective


General Appearance: positive: No acute distress, Alert.  negative: Lethargic


Eyes Bilateral: positive: Normal inspection, PERRL, No lid inflammation, 

Conjunctivae nml


ENT: positive: ENT inspection nml, Pharynx nml, No signs of dehydration.  

negative: Purulent nasal drainage, Pharyngeal erythema, Oral lesions


Neck: positive: Nml inspection, Thyroid nml, No JVD, Trachea midline.  negative

: Thyromegaly, Lymphadenopathy (R), Lymphadenopathy (L), Stiff neck, Carotid 

bruit, Swelling/bruising, Tracheal deviation


Respiratory: positive: Chest non-tender, No respiratory distress, Breath sounds 

nml.  negative: Wheezes, Rales, Rhonchi


Cardiovascular: positive: Regular rate & rhythm, No murmur, No gallop.  negative

: Irregularly irregular, Extrasystoles, Tachycardia, Bradycardia, Systolic 

murmur, Diastolic murmur


Peripheral Pulses: 2+ Radial (R), 2+ Radial (L), 2+ Dorsalis pedis (R), 2+ 

Dorsalis pedis (L)


Abdomen: positive: Non-tender, No organomegaly, Nml bowel sounds, No 

distention.  negative: Tenderness, Guarding, Rebound


Skin: positive: Color nml, No rash, Warm, Dry.  negative: Cyanosis, Diaphoresis

, Pallor


Extremities: positive: Non-tender, Nml appearance.  negative: Calf tenderness, 

Joint swelling, Glenn's sign/cords


Neurologic/Psychiatric: positive: Sensation nml, Mood/affect nml.  negative: 

Sensory loss, Facial droop, Slurred/abnml speech, Depressed mood/affect





- Lab Results


Fish Bones: 


 03/08/18 05:20





 03/08/18 05:20


Other Labs: 


 Lab Results x24hrs











  03/08/18 03/08/18 03/08/18 Range/Units





  07:36 05:20 05:20 


 


WBC    11.3 H  (4.8-10.8)  x10^3/uL


 


RBC    3.14 L  (4.70-6.10)  10^6/uL


 


Hgb    9.7 L  (14.0-18.0)  g/dL


 


Hct    29.9 L  (42.0-52.0)  %


 


MCV    95.4 H  (80.0-94.0)  fL


 


MCH    30.9  (27.0-31.0)  pg


 


MCHC    32.4  (32.0-36.0)  g/dL


 


RDW    13.6  (12.0-15.0)  %


 


Plt Count    193  (130-450)  10^3/uL


 


MPV    8.2  (7.4-11.4)  fL


 


Neut #    8.9 H  (1.5-6.6)  10^3/uL


 


Lymph #    1.2 L  (1.5-3.5)  10^3/uL


 


Mono #    0.9  (0.0-1.0)  10^3/uL


 


Eos #    0.3  (0.0-0.7)  10^3/uL


 


Baso #    0.0  (0.0-0.1)  10^3/uL


 


Absolute Nucleated RBC    0.00  x10^3/uL


 


Nucleated RBC %    0.0  /100WBC


 


Sodium   137   (135-145)  mmol/L


 


Potassium   3.7   (3.5-5.0)  mmol/L


 


Chloride   103   (101-111)  mmol/L


 


Carbon Dioxide   26   (21-32)  mmol/L


 


Anion Gap   8.0   (6-13)  


 


BUN   11   (6-20)  mg/dL


 


Creatinine   0.8   (0.6-1.2)  mg/dL


 


Estimated GFR (MDRD)   92   (>89)  


 


Glucose   116 H   ()  mg/dL


 


POC Whole Bld Glucose  115 H    (70 - 100)  mg/dL


 


Calcium   7.8 L   (8.5-10.3)  mg/dL


 


Magnesium   1.6 L   (1.7-2.8)  mg/dL


 


Total Bilirubin   0.7   (0.2-1.0)  mg/dL


 


AST   10   (10-42)  IU/L


 


ALT   < 10 L   (10-60)  IU/L


 


Alkaline Phosphatase   91   ()  IU/L


 


Total Protein   5.6 L   (6.7-8.2)  g/dL


 


Albumin   2.6 L   (3.2-5.5)  g/dL


 


Globulin   3.0   (2.1-4.2)  g/dL


 


Albumin/Globulin Ratio   0.9 L   (1.0-2.2)  














  03/07/18 03/07/18 Range/Units





  20:33 16:44 


 


WBC    (4.8-10.8)  x10^3/uL


 


RBC    (4.70-6.10)  10^6/uL


 


Hgb    (14.0-18.0)  g/dL


 


Hct    (42.0-52.0)  %


 


MCV    (80.0-94.0)  fL


 


MCH    (27.0-31.0)  pg


 


MCHC    (32.0-36.0)  g/dL


 


RDW    (12.0-15.0)  %


 


Plt Count    (130-450)  10^3/uL


 


MPV    (7.4-11.4)  fL


 


Neut #    (1.5-6.6)  10^3/uL


 


Lymph #    (1.5-3.5)  10^3/uL


 


Mono #    (0.0-1.0)  10^3/uL


 


Eos #    (0.0-0.7)  10^3/uL


 


Baso #    (0.0-0.1)  10^3/uL


 


Absolute Nucleated RBC    x10^3/uL


 


Nucleated RBC %    /100WBC


 


Sodium    (135-145)  mmol/L


 


Potassium    (3.5-5.0)  mmol/L


 


Chloride    (101-111)  mmol/L


 


Carbon Dioxide    (21-32)  mmol/L


 


Anion Gap    (6-13)  


 


BUN    (6-20)  mg/dL


 


Creatinine    (0.6-1.2)  mg/dL


 


Estimated GFR (MDRD)    (>89)  


 


Glucose    ()  mg/dL


 


POC Whole Bld Glucose  140 H  123 H  (70 - 100)  mg/dL


 


Calcium    (8.5-10.3)  mg/dL


 


Magnesium    (1.7-2.8)  mg/dL


 


Total Bilirubin    (0.2-1.0)  mg/dL


 


AST    (10-42)  IU/L


 


ALT    (10-60)  IU/L


 


Alkaline Phosphatase    ()  IU/L


 


Total Protein    (6.7-8.2)  g/dL


 


Albumin    (3.2-5.5)  g/dL


 


Globulin    (2.1-4.2)  g/dL


 


Albumin/Globulin Ratio    (1.0-2.2)  














Assessment/Plan





- Problem List


(1) Sepsis


Impression: 


(1) Sepsis


Conclusion/Plan: 


no fever on last night


WBC decreased


blood culture preliminary show no growth of bacteria


continue antibiotics


continue mild to moderate IVF 


vital and lab continue monitor





pt has fever, review of CXR and CT of abdomen


continue Rocephin and Vancomycin


follow up blood and urine culture


continue IVF


vital, lab continue monitor








Patient has previous history of septic shock during presentation in September 2017 secondary to urinary tract infection.  Today the patient presented with 

fever at [facility] however temperature was only 37.1 on arrival, 

tachycardia with heart rate in the 110s, patient was tachypneic with 

respiratory rate greater than 20 and patient had leukocytosis of greater than 15

,000.  The patient appeared to be quite dry and was hypotensive both at 

carriage and again in the emergency department here.  Source of patient's 

infection appeared to be a urinary tract infection likely due to a indwelling 

Gupta catheter.  Patient did have symptoms of dysuria and fever.  The patient's 

UA was grossly positive and urine in the catheter looked cloudy.





Plan:


Patient will be continued on IV fluids he has been given 2 L in the emergency 

department and will be given an additional 1 L bolus and then placed on 

maintenance fluid


We will monitor patient's blood pressure every 4 hours


Patient will be given IV ceftriaxone and vancomycin given his previous urine 

cultures and we will await his urine culture and blood cultures


We will remove patient's Gupta catheter and replaced with new catheter if 

needed.





(2) UTI (urinary tract infection)


Conclusion/Plan: 


UA culture reveals positive Proteus Mirabilis, and sensitive to Rocephin


continue Rocephin


continue vital monitor





continue antibiotics


pt's Gupta was removed, pt has urination, check bladder scan, will follow up


follow up UA culture 





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 

including most recent episode in September 2017 requiring intensive care with 

septic shock.  Today patient appears to be presenting with early sepsis and is 

again found to have a urinary tract infection.  Patient did present with 

symptoms of dysuria and fever.  Patient's urine analysis was grossly positive.  

The patient's Gupta catheter had cloudy urine and it was unclear as to when it 

was last changed.





Plan:


Patient is being started on IV vancomycin and ceftriaxone as patient's previous 

urine cultures grew staph aureus and Enterobacter cloacae which were 

susceptible to vancomycin and ceftriaxone


Urine cultures have been collected and will be followed up


Patient's Gupta catheter has been removed in the emergency department and after 

removal patient was able to urinate therefore we will leave the Gupta catheter 

out and allow for urinary tract infection to be treated.


We will check bladder scans to see if patient does require urinary catheter in 

the future.








(3) COPD (chronic obstructive pulmonary disease)


Conclusion/Plan: 


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


continue Oxygen supplement


continue INH treatment


vital monitor





Patient has history of COPD and is on 2.5 L of oxygen at home.  Patient has end-

stage COPD and is being followed by palliative care at [facility].  He 

is on duo nebs and Advair at home with which the patient has been stable.





Plan:


Patient will be placed on duo nebs as needed along with budesonide and 

formoterol twice a day


We will continue his home dose of oxygen








(4) BPH (benign prostatic hyperplasia)


Conclusion/Plan: 


continue Flomax


check bladder scan, if pt need Gupta in the future





The patient has history of BPH and has an indwelling Gupta catheter.  The 

patient is also on Flomax at home.


Given the patient's recurrent urinary tract infections and ongoing urinary 

tract infection the patient's current Gupta catheter was removed.  Patient was 

able to void after having the catheter removed therefore a new catheter has not 

yet been placed.  We will monitor to see if patient still needs a Gupta 

catheter.  The patient will get bladder scans throughout the day and if 

residuals are high or he has discomfort we may need to place a new Gupta 

catheter.  The patient will be continued on his home dose of Flomax.








(5) Diabetes


Conclusion/Plan: 


pt's A1C 6.3, glucose is in the good control





Patient has a history of type 2 diabetes but is not currently on any 

medications.  The patient is diet controlled.  The patient's blood glucose on 

presentation the emergency department is in the 140s which is only mildly 

elevated.  Given the patient's urinary tract infection we will try to tightly 

control his blood glucose therefore the patient will be placed on sliding scale 

insulin and we will check a hemoglobin A1c.





(6) Hyponatremia


resolved. Na is 137





(7) urinary retention


history of Gupta and UTI, sepsis from UTI


now Gupta is out, pt has urinary incontinence


Bladder scan is less than 100cc per nurse report


discuss with palliative care provider, Ms.Carla Viera, if pt does not have 

Gupta, pt will walk and high risk of fall, but in the Gupta, pt had four times 

of UTI and sepsis.


continue Flomax

## 2018-03-09 LAB
ALBUMIN DIAFP-MCNC: 2.7 G/DL (ref 3.2–5.5)
ALBUMIN/GLOB SERPL: 0.9 {RATIO} (ref 1–2.2)
ALP SERPL-CCNC: 96 IU/L (ref 42–121)
ALT SERPL W P-5'-P-CCNC: < 10 IU/L (ref 10–60)
ANION GAP SERPL CALCULATED.4IONS-SCNC: 12 MMOL/L (ref 6–13)
AST SERPL W P-5'-P-CCNC: 13 IU/L (ref 10–42)
BASOPHILS NFR BLD AUTO: 0 10^3/UL (ref 0–0.1)
BASOPHILS NFR BLD AUTO: 0.1 %
BILIRUB BLD-MCNC: 0.9 MG/DL (ref 0.2–1)
BUN SERPL-MCNC: 7 MG/DL (ref 6–20)
CALCIUM UR-MCNC: 8.1 MG/DL (ref 8.5–10.3)
CHLORIDE SERPL-SCNC: 102 MMOL/L (ref 101–111)
CO2 SERPL-SCNC: 23 MMOL/L (ref 21–32)
CREAT SERPLBLD-SCNC: 0.7 MG/DL (ref 0.6–1.2)
EOSINOPHIL # BLD AUTO: 0.3 10^3/UL (ref 0–0.7)
EOSINOPHIL NFR BLD AUTO: 2 %
ERYTHROCYTE [DISTWIDTH] IN BLOOD BY AUTOMATED COUNT: 13.4 % (ref 12–15)
GFRSERPLBLD MDRD-ARVRAT: 107 ML/MIN/{1.73_M2} (ref 89–?)
GLOBULIN SER-MCNC: 3 G/DL (ref 2.1–4.2)
GLUCOSE SERPL-MCNC: 127 MG/DL (ref 70–100)
HGB UR QL STRIP: 9.8 G/DL (ref 14–18)
LYMPHOCYTES # SPEC AUTO: 1.3 10^3/UL (ref 1.5–3.5)
LYMPHOCYTES NFR BLD AUTO: 10.3 %
MCH RBC QN AUTO: 31.1 PG (ref 27–31)
MCHC RBC AUTO-ENTMCNC: 32.3 G/DL (ref 32–36)
MCV RBC AUTO: 96.4 FL (ref 80–94)
MONOCYTES # BLD AUTO: 0.8 10^3/UL (ref 0–1)
MONOCYTES NFR BLD AUTO: 6.4 %
NEUTROPHILS # BLD AUTO: 10.4 10^3/UL (ref 1.5–6.6)
NEUTROPHILS # SNV AUTO: 12.8 X10^3/UL (ref 4.8–10.8)
NEUTROPHILS NFR BLD AUTO: 81.2 %
PDW BLD AUTO: 8.5 FL (ref 7.4–11.4)
PLATELET # BLD: 222 10^3/UL (ref 130–450)
PROT SPEC-MCNC: 5.7 G/DL (ref 6.7–8.2)
RBC MAR: 3.16 10^6/UL (ref 4.7–6.1)
SODIUM SERPLBLD-SCNC: 137 MMOL/L (ref 135–145)

## 2018-03-09 RX ADMIN — SODIUM CHLORIDE, PRESERVATIVE FREE PRN ML: 5 INJECTION INTRAVENOUS at 21:28

## 2018-03-09 RX ADMIN — INSULIN ASPART SCH: 100 INJECTION, SOLUTION INTRAVENOUS; SUBCUTANEOUS at 17:37

## 2018-03-09 RX ADMIN — SODIUM CHLORIDE SCH MLS/HR: 9 INJECTION, SOLUTION INTRAVENOUS at 06:39

## 2018-03-09 RX ADMIN — ACETAMINOPHEN PRN MG: 325 TABLET ORAL at 18:28

## 2018-03-09 RX ADMIN — IPRATROPIUM BROMIDE AND ALBUTEROL SULFATE PRN ML: 2.5; .5 SOLUTION RESPIRATORY (INHALATION) at 19:34

## 2018-03-09 RX ADMIN — FORMOTEROL FUMARATE DIHYDRATE SCH MCG: 20 SOLUTION RESPIRATORY (INHALATION) at 07:25

## 2018-03-09 RX ADMIN — OXYCODONE PRN MG: 5 TABLET ORAL at 10:43

## 2018-03-09 RX ADMIN — INSULIN ASPART SCH: 100 INJECTION, SOLUTION INTRAVENOUS; SUBCUTANEOUS at 21:31

## 2018-03-09 RX ADMIN — LATANOPROST SCH: 50 SOLUTION OPHTHALMIC at 21:33

## 2018-03-09 RX ADMIN — Medication SCH MG: at 17:42

## 2018-03-09 RX ADMIN — SODIUM CHLORIDE, PRESERVATIVE FREE SCH ML: 5 INJECTION INTRAVENOUS at 01:53

## 2018-03-09 RX ADMIN — SODIUM CHLORIDE, PRESERVATIVE FREE SCH: 5 INJECTION INTRAVENOUS at 08:48

## 2018-03-09 RX ADMIN — SODIUM CHLORIDE, PRESERVATIVE FREE SCH: 5 INJECTION INTRAVENOUS at 17:37

## 2018-03-09 RX ADMIN — FAMOTIDINE SCH MG: 20 TABLET, FILM COATED ORAL at 08:44

## 2018-03-09 RX ADMIN — BUDESONIDE SCH MG: 0.5 SUSPENSION RESPIRATORY (INHALATION) at 07:26

## 2018-03-09 RX ADMIN — SODIUM CHLORIDE SCH: 9 INJECTION, SOLUTION INTRAVENOUS at 06:40

## 2018-03-09 RX ADMIN — TAMSULOSIN HYDROCHLORIDE SCH: 0.4 CAPSULE ORAL at 21:33

## 2018-03-09 RX ADMIN — CLOPIDOGREL BISULFATE SCH MG: 75 TABLET ORAL at 08:44

## 2018-03-09 RX ADMIN — INSULIN ASPART SCH: 100 INJECTION, SOLUTION INTRAVENOUS; SUBCUTANEOUS at 08:48

## 2018-03-09 RX ADMIN — ENOXAPARIN SODIUM SCH MG: 100 INJECTION SUBCUTANEOUS at 08:46

## 2018-03-09 RX ADMIN — FORMOTEROL FUMARATE DIHYDRATE SCH MCG: 20 SOLUTION RESPIRATORY (INHALATION) at 19:34

## 2018-03-09 RX ADMIN — BUDESONIDE SCH MG: 0.5 SUSPENSION RESPIRATORY (INHALATION) at 19:34

## 2018-03-09 RX ADMIN — IPRATROPIUM BROMIDE AND ALBUTEROL SULFATE PRN ML: 2.5; .5 SOLUTION RESPIRATORY (INHALATION) at 07:25

## 2018-03-09 RX ADMIN — DEXTROSE AND SODIUM CHLORIDE SCH MLS/HR: 5; 900 INJECTION, SOLUTION INTRAVENOUS at 21:51

## 2018-03-09 RX ADMIN — MORPHINE SULFATE PRN MG: 2 INJECTION, SOLUTION INTRAMUSCULAR; INTRAVENOUS at 19:22

## 2018-03-09 RX ADMIN — MIRTAZAPINE SCH: 15 TABLET, FILM COATED ORAL at 21:33

## 2018-03-09 RX ADMIN — SODIUM CHLORIDE SCH MLS/HR: 9 INJECTION, SOLUTION INTRAVENOUS at 14:31

## 2018-03-09 RX ADMIN — INSULIN ASPART SCH: 100 INJECTION, SOLUTION INTRAVENOUS; SUBCUTANEOUS at 11:58

## 2018-03-09 RX ADMIN — GABAPENTIN SCH: 100 CAPSULE ORAL at 21:32

## 2018-03-09 RX ADMIN — SODIUM CHLORIDE SCH MLS/HR: 900 INJECTION INTRAVENOUS at 01:53

## 2018-03-09 RX ADMIN — Medication SCH MG: at 08:44

## 2018-03-09 RX ADMIN — POLYETHYLENE GLYCOL 3350 SCH GM: 17 POWDER, FOR SOLUTION ORAL at 08:48

## 2018-03-09 NOTE — PROVIDER PROGRESS NOTE
Subjective





- Prog Note Date


Prog Note Date: 03/09/18





- Subjective


Pt reports feeling: No change


Subjective: 


pt is still confused, may be baseline. Pt is reported poor appetite. The 

nutritionist follow up pt now.








Current Medications





- Current Medications


Current Medications: 





Active Medications





Acetaminophen (Tylenol)  650 mg PO Q4HR PRN


   PRN Reason: Pain 1 to 4


   Last Admin: 03/08/18 15:52 Dose:  650 mg


Albuterol/Ipratropium (Duoneb)  3 ml INH Q4HR PRN


   PRN Reason: Wheezing


   Last Admin: 03/09/18 07:25 Dose:  3 ml


Budesonide (Pulmicort)  0.5 mg INH RTBID [facility]


   Last Admin: 03/09/18 07:26 Dose:  0.5 mg


Clopidogrel Bisulfate (Plavix)  75 mg PO DAILY [facility]


   Last Admin: 03/09/18 08:44 Dose:  75 mg


Enoxaparin Sodium (Lovenox)  40 mg SUBQ DAILY [facility]


   Last Admin: 03/09/18 08:46 Dose:  40 mg


Famotidine (Pepcid)  20 mg PO DAILY [facility]


   Last Admin: 03/09/18 08:44 Dose:  20 mg


Formoterol Fumarate (Perforomist)  20 mcg INH RTBID [facility]


   Last Admin: 03/09/18 07:25 Dose:  20 mcg


Gabapentin (Neurontin)  200 mg PO HS [facility]


   Last Admin: 03/08/18 21:18 Dose:  200 mg


Guaifenesin (Robitussin Liquid)  200 mg PO Q4H PRN


   PRN Reason: Cough


   Last Admin: 03/08/18 11:05 Dose:  200 mg


Sodium Chloride (Normal Saline 0.9%)  1,000 mls @ 120 mls/hr IV .Q8H20M [facility]


   Last Admin: 03/09/18 06:40 Dose:  Not Given


Ceftriaxone Sodium 2 gm/ (Sodium Chloride)  100 mls @ 200 mls/hr IV Q24H [facility]


   Last Infusion: 03/09/18 02:23 Dose:  Infused


Insulin Aspart (Novolog)  1 - 5 unit SUBQ 0800,1200,1700,2100 DON


   PRN Reason: Protocol


   Last Admin: 03/09/18 08:48 Dose:  Not Given


Latanoprost (Xalatan Ophth Drops)  0 drops EACHEYE QPM [facility]


   Last Admin: 03/08/18 21:23 Dose:  1 drops


Mirtazapine (Remeron)  15 mg PO QPM [facility]


   Last Admin: 03/08/18 21:18 Dose:  15 mg


Ondansetron HCl (Zofran Odt)  4 mg TL Q6HR PRN


   PRN Reason: Nausea / Vomiting


Oxycodone HCl (Roxicodone)  5 mg PO Q4HR PRN


   PRN Reason: Pain 5 to 7


   Last Admin: 03/09/18 10:43 Dose:  5 mg


Polyethylene Glycol (Miralax)  17 gm PO DAILY [facility]


   Last Admin: 03/09/18 08:48 Dose:  17 gm


Prochlorperazine Edisylate (Compazine Inj)  10 mg IVP Q6HR PRN


   PRN Reason: Nausea / Vomiting


Saccharomyces Boulardii (Florastor)  250 mg PO BID [facility]


   Last Admin: 03/09/18 08:44 Dose:  250 mg


Sodium Chloride (Normal Saline Flush 0.9%)  10 ml IVP PRN PRN


   PRN Reason: AS NEEDED PER PROVIDER ORDERS


Sodium Chloride (Normal Saline Flush 0.9%)  10 ml IVP 0100,0900,1700 [facility]


   Last Admin: 03/09/18 08:48 Dose:  Not Given


Tamsulosin HCl (Flomax)  0.4 mg PO QPM [facility]


   Last Admin: 03/08/18 21:18 Dose:  0.4 mg





 





Acetaminophen 650 mg PO Q6H PRN 11/26/16 


Fluticasone/Salmeterol [Advair 100-50 Diskus] 1 inh INH BID 11/26/16 


Ipratropium/Albuterol [Duoneb] 3 ml INH Q6H PRN 07/22/17 


Mirtazapine [Remeron] 15 mg PO QPM 07/22/17 


Tamsulosin HCl [Flomax] 0.4 mg PO QPM@1800 07/22/17 


Guaifenesin [Child Mucinex Chest Congestion] 10 ml PO Q4H PRN 11/10/17 


Cholecalciferol [Vitamin D3] 5,000 unit PO FR@0800 01/25/18 


Senna [Senokot] 1 tab PO DAILY@1200 01/26/18 


Gabapentin 200 mg PO QPM 02/26/18 


Gabapentin [Neurontin] 200 mg PO 0700,1400 03/07/18 











Objective





- Vital Signs/Intake & Output


Reviewed Vital Signs: Yes


Vital Signs: 


 Vital Signs x48h











  Temp Pulse Pulse Resp BP Pulse Ox


 


 03/09/18 07:54  37.2 C   107 H  21  123/77  95


 


 03/09/18 07:25   93   22  


 


 03/09/18 05:00  36.5 C   103 H  20  133/46 H  94











Intake & Output: 


 Intake & Output











 03/06/18 03/07/18 03/08/18 03/09/18





 23:59 23:59 23:59 23:59


 


Intake Total  3650 3250 1100


 


Output Total  200  


 


Balance  3450 3250 1100














- Objective


General Appearance: positive: No acute distress, Alert.  negative: Lethargic


Eyes Bilateral: positive: Normal inspection, PERRL, No lid inflammation, 

Conjunctivae nml


ENT: positive: ENT inspection nml, Pharynx nml, No signs of dehydration.  

negative: Purulent nasal drainage, Pharyngeal erythema, Oral lesions


Neck: positive: Nml inspection, Thyroid nml, No JVD, Trachea midline.  negative

: Thyromegaly, Lymphadenopathy (R), Lymphadenopathy (L), Stiff neck, Carotid 

bruit, Swelling/bruising, Tracheal deviation


Respiratory: positive: Chest non-tender, No respiratory distress, Breath sounds 

nml.  negative: Wheezes, Rales, Rhonchi


Cardiovascular: positive: Regular rate & rhythm, No murmur, No gallop.  negative

: Irregularly irregular, Extrasystoles, Tachycardia, Bradycardia, Systolic 

murmur, Diastolic murmur


Peripheral Pulses: 2+ Radial (R), 2+ Radial (L), 2+ Dorsalis pedis (R), 2+ 

Dorsalis pedis (L)


Abdomen: positive: Non-tender, No organomegaly, Nml bowel sounds, No 

distention.  negative: Tenderness, Guarding, Rebound


Back: positive: Nml inspection.  negative: CVA tenderness (R), CVA tenderness (L

)


Skin: positive: Color nml, No rash, Warm, Dry.  negative: Cyanosis, Diaphoresis

, Pallor


Extremities: positive: Non-tender, Full ROM, Nml appearance.  negative: Calf 

tenderness, Joint swelling, Glenn's sign/cords


Neurologic/Psychiatric: positive: Sensation nml, Mood/affect nml.  negative: 

Sensory loss, Facial droop, Slurred/abnml speech, Depressed mood/affect





- Lab Results


Fish Bones: 


 03/09/18 05:15





 03/09/18 05:15


Other Labs: 


 Lab Results x24hrs











  03/09/18 03/09/18 03/09/18 Range/Units





  08:05 05:15 05:15 


 


WBC    12.8 H  (4.8-10.8)  x10^3/uL


 


RBC    3.16 L  (4.70-6.10)  10^6/uL


 


Hgb    9.8 L  (14.0-18.0)  g/dL


 


Hct    30.4 L  (42.0-52.0)  %


 


MCV    96.4 H  (80.0-94.0)  fL


 


MCH    31.1 H  (27.0-31.0)  pg


 


MCHC    32.3  (32.0-36.0)  g/dL


 


RDW    13.4  (12.0-15.0)  %


 


Plt Count    222  (130-450)  10^3/uL


 


MPV    8.5  (7.4-11.4)  fL


 


Neut #    10.4 H  (1.5-6.6)  10^3/uL


 


Lymph #    1.3 L  (1.5-3.5)  10^3/uL


 


Mono #    0.8  (0.0-1.0)  10^3/uL


 


Eos #    0.3  (0.0-0.7)  10^3/uL


 


Baso #    0.0  (0.0-0.1)  10^3/uL


 


Absolute Nucleated RBC    0.00  x10^3/uL


 


Nucleated RBC %    0.0  /100WBC


 


Sodium   137   (135-145)  mmol/L


 


Potassium   3.8   (3.5-5.0)  mmol/L


 


Chloride   102   (101-111)  mmol/L


 


Carbon Dioxide   23   (21-32)  mmol/L


 


Anion Gap   12.0   (6-13)  


 


BUN   7   (6-20)  mg/dL


 


Creatinine   0.7   (0.6-1.2)  mg/dL


 


Estimated GFR (MDRD)   107   (>89)  


 


Glucose   127 H   ()  mg/dL


 


POC Whole Bld Glucose  102 H    (70 - 100)  mg/dL


 


Calcium   8.1 L   (8.5-10.3)  mg/dL


 


Total Bilirubin   0.9   (0.2-1.0)  mg/dL


 


AST   13   (10-42)  IU/L


 


ALT   < 10 L   (10-60)  IU/L


 


Alkaline Phosphatase   96   ()  IU/L


 


Total Protein   5.7 L   (6.7-8.2)  g/dL


 


Albumin   2.7 L   (3.2-5.5)  g/dL


 


Globulin   3.0   (2.1-4.2)  g/dL


 


Albumin/Globulin Ratio   0.9 L   (1.0-2.2)  














  03/08/18 03/08/18 03/08/18 Range/Units





  20:47 16:26 11:51 


 


WBC     (4.8-10.8)  x10^3/uL


 


RBC     (4.70-6.10)  10^6/uL


 


Hgb     (14.0-18.0)  g/dL


 


Hct     (42.0-52.0)  %


 


MCV     (80.0-94.0)  fL


 


MCH     (27.0-31.0)  pg


 


MCHC     (32.0-36.0)  g/dL


 


RDW     (12.0-15.0)  %


 


Plt Count     (130-450)  10^3/uL


 


MPV     (7.4-11.4)  fL


 


Neut #     (1.5-6.6)  10^3/uL


 


Lymph #     (1.5-3.5)  10^3/uL


 


Mono #     (0.0-1.0)  10^3/uL


 


Eos #     (0.0-0.7)  10^3/uL


 


Baso #     (0.0-0.1)  10^3/uL


 


Absolute Nucleated RBC     x10^3/uL


 


Nucleated RBC %     /100WBC


 


Sodium     (135-145)  mmol/L


 


Potassium     (3.5-5.0)  mmol/L


 


Chloride     (101-111)  mmol/L


 


Carbon Dioxide     (21-32)  mmol/L


 


Anion Gap     (6-13)  


 


BUN     (6-20)  mg/dL


 


Creatinine     (0.6-1.2)  mg/dL


 


Estimated GFR (MDRD)     (>89)  


 


Glucose     ()  mg/dL


 


POC Whole Bld Glucose  127 H  115 H  141 H  (70 - 100)  mg/dL


 


Calcium     (8.5-10.3)  mg/dL


 


Total Bilirubin     (0.2-1.0)  mg/dL


 


AST     (10-42)  IU/L


 


ALT     (10-60)  IU/L


 


Alkaline Phosphatase     ()  IU/L


 


Total Protein     (6.7-8.2)  g/dL


 


Albumin     (3.2-5.5)  g/dL


 


Globulin     (2.1-4.2)  g/dL


 


Albumin/Globulin Ratio     (1.0-2.2)  














Assessment/Plan





- Problem List


(1) Sepsis


Impression: 


(1) Sepsis


Conclusion/Plan: 


no fever but elevated temperature, slight elevated WBC


blood culture preliminary show no growth of bacteria, then vancomycin was 

stopped  


order lactic acid


UA culture and sensitive study to Rocephin


discuss with other provider, may resume vanco or add another agent





no fever on last night


WBC decreased


blood culture preliminary show no growth of bacteria


continue antibiotics


continue mild to moderate IVF 


vital and lab continue monitor





pt has fever, review of CXR and CT of abdomen


continue Rocephin and Vancomycin


follow up blood and urine culture


continue IVF


vital, lab continue monitor








Patient has previous history of septic shock during presentation in September 2017 secondary to urinary tract infection.  Today the patient presented with 

fever at [facility] however temperature was only 37.1 on arrival, 

tachycardia with heart rate in the 110s, patient was tachypneic with 

respiratory rate greater than 20 and patient had leukocytosis of greater than 15

,000.  The patient appeared to be quite dry and was hypotensive both at 

carriage and again in the emergency department here.  Source of patient's 

infection appeared to be a urinary tract infection likely due to a indwelling 

Gupta catheter.  Patient did have symptoms of dysuria and fever.  The patient's 

UA was grossly positive and urine in the catheter looked cloudy.





Plan:


Patient will be continued on IV fluids he has been given 2 L in the emergency 

department and will be given an additional 1 L bolus and then placed on 

maintenance fluid


We will monitor patient's blood pressure every 4 hours


Patient will be given IV ceftriaxone and vancomycin given his previous urine 

cultures and we will await his urine culture and blood cultures


We will remove patient's Gupta catheter and replaced with new catheter if 

needed.





(2) UTI (urinary tract infection)


Conclusion/Plan: 


continue Rocephin





UA culture reveals positive Proteus Mirabilis, and sensitive to Rocephin


continue Rocephin


continue vital monitor





continue antibiotics


pt's Gupta was removed, pt has urination, check bladder scan, will follow up


follow up UA culture 





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 

including most recent episode in September 2017 requiring intensive care with 

septic shock.  Today patient appears to be presenting with early sepsis and is 

again found to have a urinary tract infection.  Patient did present with 

symptoms of dysuria and fever.  Patient's urine analysis was grossly positive.  

The patient's Gupta catheter had cloudy urine and it was unclear as to when it 

was last changed.





Plan:


Patient is being started on IV vancomycin and ceftriaxone as patient's previous 

urine cultures grew staph aureus and Enterobacter cloacae which were 

susceptible to vancomycin and ceftriaxone


Urine cultures have been collected and will be followed up


Patient's Gupta catheter has been removed in the emergency department and after 

removal patient was able to urinate therefore we will leave the Gupta catheter 

out and allow for urinary tract infection to be treated.


We will check bladder scans to see if patient does require urinary catheter in 

the future.








(3) COPD (chronic obstructive pulmonary disease)


Conclusion/Plan: 


continue supplement of oxygen


continue breath treatment





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


continue Oxygen supplement


continue INH treatment


vital monitor





Patient has history of COPD and is on 2.5 L of oxygen at home.  Patient has end-

stage COPD and is being followed by palliative care at [facility].  He 

is on duo nebs and Advair at home with which the patient has been stable.





Plan:


Patient will be placed on duo nebs as needed along with budesonide and 

formoterol twice a day


We will continue his home dose of oxygen








(4) BPH (benign prostatic hyperplasia)


Conclusion/Plan: 


continue Flomax


check bladder scan, if pt need Gupta in the future





The patient has history of BPH and has an indwelling Gupta catheter.  The 

patient is also on Flomax at home.


Given the patient's recurrent urinary tract infections and ongoing urinary 

tract infection the patient's current Gupta catheter was removed.  Patient was 

able to void after having the catheter removed therefore a new catheter has not 

yet been placed.  We will monitor to see if patient still needs a Gupta 

catheter.  The patient will get bladder scans throughout the day and if 

residuals are high or he has discomfort we may need to place a new Gupta 

catheter.  The patient will be continued on his home dose of Flomax.








(5) Diabetes


Conclusion/Plan: 


pt's A1C 6.3, glucose is in the good control





Patient has a history of type 2 diabetes but is not currently on any 

medications.  The patient is diet controlled.  The patient's blood glucose on 

presentation the emergency department is in the 140s which is only mildly 

elevated.  Given the patient's urinary tract infection we will try to tightly 

control his blood glucose therefore the patient will be placed on sliding scale 

insulin and we will check a hemoglobin A1c.





(6) Hyponatremia


resolved. Na is 137





(7) urinary retention


bladder scan pt had less 100 ml in the bladder. It seems not the issue. 


continue Flomax





(8) urinary incontinence


pt did have urinary incontinence


continue nurse care


continue hold insertion of Gupta because of infection from catherization

## 2018-03-10 LAB
ALBUMIN DIAFP-MCNC: 2.5 G/DL (ref 3.2–5.5)
ALBUMIN/GLOB SERPL: 0.8 {RATIO} (ref 1–2.2)
ALP SERPL-CCNC: 84 IU/L (ref 42–121)
ALT SERPL W P-5'-P-CCNC: < 10 IU/L (ref 10–60)
ANION GAP SERPL CALCULATED.4IONS-SCNC: 8 MMOL/L (ref 6–13)
AST SERPL W P-5'-P-CCNC: 12 IU/L (ref 10–42)
BASOPHILS NFR BLD AUTO: 0 10^3/UL (ref 0–0.1)
BASOPHILS NFR BLD AUTO: 0.2 %
BILIRUB BLD-MCNC: 0.6 MG/DL (ref 0.2–1)
BUN SERPL-MCNC: 8 MG/DL (ref 6–20)
CALCIUM UR-MCNC: 8.1 MG/DL (ref 8.5–10.3)
CHLORIDE SERPL-SCNC: 106 MMOL/L (ref 101–111)
CO2 SERPL-SCNC: 27 MMOL/L (ref 21–32)
CREAT SERPLBLD-SCNC: 0.8 MG/DL (ref 0.6–1.2)
CRP SERPL-MCNC: 16.1 MG/DL (ref 0–1)
EOSINOPHIL # BLD AUTO: 0.4 10^3/UL (ref 0–0.7)
EOSINOPHIL NFR BLD AUTO: 4.5 %
ERYTHROCYTE [DISTWIDTH] IN BLOOD BY AUTOMATED COUNT: 13.5 % (ref 12–15)
GFRSERPLBLD MDRD-ARVRAT: 92 ML/MIN/{1.73_M2} (ref 89–?)
GLOBULIN SER-MCNC: 3.1 G/DL (ref 2.1–4.2)
GLUCOSE SERPL-MCNC: 125 MG/DL (ref 70–100)
HGB UR QL STRIP: 9.6 G/DL (ref 14–18)
LYMPHOCYTES # SPEC AUTO: 1 10^3/UL (ref 1.5–3.5)
LYMPHOCYTES NFR BLD AUTO: 12 %
MAGNESIUM SERPL-MCNC: 1.6 MG/DL (ref 1.7–2.8)
MCH RBC QN AUTO: 31.3 PG (ref 27–31)
MCHC RBC AUTO-ENTMCNC: 32.6 G/DL (ref 32–36)
MCV RBC AUTO: 95.9 FL (ref 80–94)
MONOCYTES # BLD AUTO: 0.8 10^3/UL (ref 0–1)
MONOCYTES NFR BLD AUTO: 8.6 %
NEUTROPHILS # BLD AUTO: 6.5 10^3/UL (ref 1.5–6.6)
NEUTROPHILS # SNV AUTO: 8.7 X10^3/UL (ref 4.8–10.8)
NEUTROPHILS NFR BLD AUTO: 74.7 %
PDW BLD AUTO: 8.4 FL (ref 7.4–11.4)
PLATELET # BLD: 212 10^3/UL (ref 130–450)
PROT SPEC-MCNC: 5.6 G/DL (ref 6.7–8.2)
RBC MAR: 3.06 10^6/UL (ref 4.7–6.1)
SODIUM SERPLBLD-SCNC: 141 MMOL/L (ref 135–145)

## 2018-03-10 RX ADMIN — ACETAMINOPHEN PRN MG: 325 TABLET ORAL at 19:17

## 2018-03-10 RX ADMIN — DEXTROSE AND SODIUM CHLORIDE SCH MLS/HR: 5; 900 INJECTION, SOLUTION INTRAVENOUS at 11:25

## 2018-03-10 RX ADMIN — MIRTAZAPINE SCH MG: 15 TABLET, FILM COATED ORAL at 19:16

## 2018-03-10 RX ADMIN — INSULIN ASPART SCH UNIT: 100 INJECTION, SOLUTION INTRAVENOUS; SUBCUTANEOUS at 22:09

## 2018-03-10 RX ADMIN — Medication SCH MG: at 10:13

## 2018-03-10 RX ADMIN — DEXTROSE AND SODIUM CHLORIDE SCH MLS/HR: 5; 900 INJECTION, SOLUTION INTRAVENOUS at 22:10

## 2018-03-10 RX ADMIN — INSULIN ASPART SCH: 100 INJECTION, SOLUTION INTRAVENOUS; SUBCUTANEOUS at 17:31

## 2018-03-10 RX ADMIN — ENOXAPARIN SODIUM SCH MG: 100 INJECTION SUBCUTANEOUS at 10:13

## 2018-03-10 RX ADMIN — INSULIN ASPART SCH: 100 INJECTION, SOLUTION INTRAVENOUS; SUBCUTANEOUS at 09:34

## 2018-03-10 RX ADMIN — MORPHINE SULFATE PRN MG: 2 INJECTION, SOLUTION INTRAMUSCULAR; INTRAVENOUS at 17:31

## 2018-03-10 RX ADMIN — FAMOTIDINE SCH MG: 20 TABLET, FILM COATED ORAL at 10:13

## 2018-03-10 RX ADMIN — SODIUM CHLORIDE, PRESERVATIVE FREE SCH: 5 INJECTION INTRAVENOUS at 17:34

## 2018-03-10 RX ADMIN — BUDESONIDE SCH MG: 0.5 SUSPENSION RESPIRATORY (INHALATION) at 20:18

## 2018-03-10 RX ADMIN — BUDESONIDE SCH MG: 0.5 SUSPENSION RESPIRATORY (INHALATION) at 07:44

## 2018-03-10 RX ADMIN — OXYCODONE PRN MG: 5 TABLET ORAL at 15:05

## 2018-03-10 RX ADMIN — SODIUM CHLORIDE, PRESERVATIVE FREE SCH: 5 INJECTION INTRAVENOUS at 23:26

## 2018-03-10 RX ADMIN — GABAPENTIN SCH MG: 100 CAPSULE ORAL at 19:15

## 2018-03-10 RX ADMIN — SODIUM CHLORIDE, PRESERVATIVE FREE SCH: 5 INJECTION INTRAVENOUS at 01:37

## 2018-03-10 RX ADMIN — INSULIN ASPART SCH UNIT: 100 INJECTION, SOLUTION INTRAVENOUS; SUBCUTANEOUS at 12:20

## 2018-03-10 RX ADMIN — SODIUM CHLORIDE SCH MLS/HR: 900 INJECTION INTRAVENOUS at 01:42

## 2018-03-10 RX ADMIN — IPRATROPIUM BROMIDE AND ALBUTEROL SULFATE PRN ML: 2.5; .5 SOLUTION RESPIRATORY (INHALATION) at 20:18

## 2018-03-10 RX ADMIN — FORMOTEROL FUMARATE DIHYDRATE SCH MCG: 20 SOLUTION RESPIRATORY (INHALATION) at 07:44

## 2018-03-10 RX ADMIN — POLYETHYLENE GLYCOL 3350 SCH GM: 17 POWDER, FOR SOLUTION ORAL at 10:13

## 2018-03-10 RX ADMIN — CLOPIDOGREL BISULFATE SCH MG: 75 TABLET ORAL at 10:13

## 2018-03-10 RX ADMIN — Medication SCH MG: at 19:16

## 2018-03-10 RX ADMIN — SODIUM CHLORIDE, PRESERVATIVE FREE SCH: 5 INJECTION INTRAVENOUS at 10:14

## 2018-03-10 RX ADMIN — LATANOPROST SCH DROPS: 50 SOLUTION OPHTHALMIC at 19:47

## 2018-03-10 RX ADMIN — TAMSULOSIN HYDROCHLORIDE SCH MG: 0.4 CAPSULE ORAL at 19:16

## 2018-03-10 RX ADMIN — FORMOTEROL FUMARATE DIHYDRATE SCH MCG: 20 SOLUTION RESPIRATORY (INHALATION) at 20:18

## 2018-03-10 RX ADMIN — IPRATROPIUM BROMIDE AND ALBUTEROL SULFATE PRN ML: 2.5; .5 SOLUTION RESPIRATORY (INHALATION) at 07:44

## 2018-03-10 NOTE — PROVIDER PROGRESS NOTE
Subjective





- Prog Note Date


Prog Note Date: 03/10/18





- Subjective


Pt reports feeling: No change


Subjective: 


pt is still confused. No chest pain, shortness of breath, no fever, no chill, 

no cough








Current Medications





- Current Medications


Current Medications: 





Active Medications





Acetaminophen (Tylenol)  650 mg PO Q4HR PRN


   PRN Reason: Pain 1 to 4


   Last Admin: 03/08/18 15:52 Dose:  650 mg


Albuterol/Ipratropium (Duoneb)  3 ml INH Q4HR PRN


   PRN Reason: Wheezing


   Last Admin: 03/10/18 07:44 Dose:  3 ml


Budesonide (Pulmicort)  0.5 mg INH RTBID DON


   Last Admin: 03/10/18 07:44 Dose:  0.5 mg


Carboxymethylcellulose (Refresh 1% Ophth Drops)  1 drops EACHEYE PRN PRN


   PRN Reason: Dry Eye


   Last Admin: 03/09/18 15:07 Dose:  1 drops


Clopidogrel Bisulfate (Plavix)  75 mg PO DAILY [facility]


   Last Admin: 03/10/18 10:13 Dose:  75 mg


Enoxaparin Sodium (Lovenox)  40 mg SUBQ DAILY [facility]


   Last Admin: 03/10/18 10:13 Dose:  40 mg


Famotidine (Pepcid)  20 mg PO DAILY [facility]


   Last Admin: 03/10/18 10:13 Dose:  20 mg


Formoterol Fumarate (Perforomist)  20 mcg INH RTBID DON


   Last Admin: 03/10/18 07:44 Dose:  20 mcg


Gabapentin (Neurontin)  200 mg PO HS [facility]


   Last Admin: 03/09/18 21:32 Dose:  Not Given


Guaifenesin (Robitussin Liquid)  200 mg PO Q4H PRN


   PRN Reason: Cough


   Last Admin: 03/08/18 11:05 Dose:  200 mg


Ceftriaxone Sodium 2 gm/ (Sodium Chloride)  100 mls @ 200 mls/hr IV Q24H [facility]


   Last Infusion: 03/10/18 02:15 Dose:  Infused


Dextrose/Sodium Chloride (D5ns)  1,000 mls @ 83.333 mls/hr IV .Q12H [facility]


   Last Admin: 03/10/18 11:25 Dose:  83.333 mls/hr


Insulin Aspart (Novolog)  1 - 5 unit SUBQ 0800,1200,1700,2100 [facility]


   PRN Reason: Protocol


   Last Admin: 03/10/18 12:20 Dose:  1 unit


Latanoprost (Xalatan Ophth Drops)  0 drops EACHEYE QPM [facility]


   Last Admin: 03/09/18 21:33 Dose:  Not Given


Mirtazapine (Remeron)  15 mg PO QPM [facility]


   Last Admin: 03/09/18 21:33 Dose:  Not Given


Morphine Sulfate (Morphine (Carpuject))  2 mg IVP Q4HR PRN


   PRN Reason: PAIN


   Last Admin: 03/09/18 19:22 Dose:  2 mg


Ondansetron HCl (Zofran Odt)  4 mg TL Q6HR PRN


   PRN Reason: Nausea / Vomiting


Oxycodone HCl (Roxicodone)  5 mg PO Q4HR PRN


   PRN Reason: Pain 5 to 7


   Last Admin: 03/10/18 15:05 Dose:  5 mg


Polyethylene Glycol (Miralax)  17 gm PO DAILY [facility]


   Last Admin: 03/10/18 10:13 Dose:  17 gm


Prochlorperazine Edisylate (Compazine Inj)  10 mg IVP Q6HR PRN


   PRN Reason: Nausea / Vomiting


Saccharomyces Boulardii (Florastor)  250 mg PO BIDWM [facility]


   Last Admin: 03/10/18 10:13 Dose:  250 mg


Simethicone (Mylicon)  80 mg PO 0900,1300,1800,2100 PRN


   PRN Reason: Gas


Sodium Chloride (Normal Saline Flush 0.9%)  10 ml IVP PRN PRN


   PRN Reason: AS NEEDED PER PROVIDER ORDERS


   Last Admin: 03/09/18 21:28 Dose:  10 ml


Sodium Chloride (Normal Saline Flush 0.9%)  10 ml IVP 0100,0900,1700 [facility]


   Last Admin: 03/10/18 10:14 Dose:  Not Given


Tamsulosin HCl (Flomax)  0.4 mg PO QPM [facility]


   Last Admin: 03/09/18 21:33 Dose:  Not Given





 





Acetaminophen 650 mg PO Q6H PRN 11/26/16 


Fluticasone/Salmeterol [Advair 100-50 Diskus] 1 inh INH BID 11/26/16 


Ipratropium/Albuterol [Duoneb] 3 ml INH Q6H PRN 07/22/17 


Mirtazapine [Remeron] 15 mg PO QPM 07/22/17 


Tamsulosin HCl [Flomax] 0.4 mg PO QPM@1800 07/22/17 


Guaifenesin [Child Mucinex Chest Congestion] 10 ml PO Q4H PRN 11/10/17 


Cholecalciferol [Vitamin D3] 5,000 unit PO FR@0800 01/25/18 


Senna [Senokot] 1 tab PO DAILY@1200 01/26/18 


Gabapentin 200 mg PO QPM 02/26/18 


Gabapentin [Neurontin] 200 mg PO 0700,1400 03/07/18 











Objective





- Vital Signs/Intake & Output


Reviewed Vital Signs: Yes


Vital Signs: 


 Vital Signs x48h











  Temp Pulse Pulse Resp BP Pulse Ox


 


 03/10/18 13:10  36.6 C   88  19  148/57 H  96


 


 03/10/18 07:54  37.2 C   90  19  144/63 H  94


 


 03/10/18 07:48   90   20  











Intake & Output: 


 Intake & Output











 03/07/18 03/08/18 03/09/18 03/10/18





 23:59 23:59 23:59 23:59


 


Intake Total 3650 3250 2919.997 1100


 


Output Total 200   


 


Balance 3450 3250 2919.997 1100














- Objective


General Appearance: positive: No acute distress, Alert.  negative: Lethargic


Eyes Bilateral: positive: Normal inspection, PERRL, No lid inflammation, 

Conjunctivae nml


ENT: positive: ENT inspection nml, Pharynx nml, No signs of dehydration.  

negative: Purulent nasal drainage, Pharyngeal erythema, Oral lesions, Dry 

mucous membranes


Neck: positive: Nml inspection, Thyroid nml, No JVD, Trachea midline.  negative

: Thyromegaly, Lymphadenopathy (R), Lymphadenopathy (L), Stiff neck, Carotid 

bruit, Swelling/bruising, Tracheal deviation


Respiratory: positive: Chest non-tender, No respiratory distress, Breath sounds 

nml.  negative: Wheezes, Rales, Rhonchi


Cardiovascular: positive: Regular rate & rhythm, No murmur, No gallop.  negative

: Irregularly irregular, Extrasystoles, Tachycardia, Bradycardia, Systolic 

murmur, Diastolic murmur


Peripheral Pulses: 2+ Radial (R), 2+ Radial (L), 2+ Dorsalis pedis (R), 2+ 

Dorsalis pedis (L)


Abdomen: positive: Non-tender, No organomegaly, Nml bowel sounds, No 

distention.  negative: Tenderness, Guarding, Rebound


Back: positive: Nml inspection.  negative: CVA tenderness (R), CVA tenderness (L

)


Skin: positive: Color nml, No rash, Warm, Dry.  negative: Cyanosis, Diaphoresis

, Pallor


Extremities: positive: Non-tender.  negative: Calf tenderness, Joint swelling, 

Glenn's sign/cords, Other


Neurologic/Psychiatric: positive: Sensation nml, Mood/affect nml.  negative: 

Sensory loss, Facial droop, Slurred/abnml speech, Depressed mood/affect





- Lab Results


Fish Bones: 


 03/10/18 04:48





 03/10/18 04:48


Other Labs: 


 Lab Results x24hrs











  03/10/18 03/10/18 03/10/18 Range/Units





  11:32 07:51 04:48 


 


WBC     (4.8-10.8)  x10^3/uL


 


RBC     (4.70-6.10)  10^6/uL


 


Hgb     (14.0-18.0)  g/dL


 


Hct     (42.0-52.0)  %


 


MCV     (80.0-94.0)  fL


 


MCH     (27.0-31.0)  pg


 


MCHC     (32.0-36.0)  g/dL


 


RDW     (12.0-15.0)  %


 


Plt Count     (130-450)  10^3/uL


 


MPV     (7.4-11.4)  fL


 


Neut #     (1.5-6.6)  10^3/uL


 


Lymph #     (1.5-3.5)  10^3/uL


 


Mono #     (0.0-1.0)  10^3/uL


 


Eos #     (0.0-0.7)  10^3/uL


 


Baso #     (0.0-0.1)  10^3/uL


 


Absolute Nucleated RBC     x10^3/uL


 


Nucleated RBC %     /100WBC


 


ESR     (0-20)  mm/Hr


 


Sodium     (135-145)  mmol/L


 


Potassium     (3.5-5.0)  mmol/L


 


Chloride     (101-111)  mmol/L


 


Carbon Dioxide     (21-32)  mmol/L


 


Anion Gap     (6-13)  


 


BUN     (6-20)  mg/dL


 


Creatinine     (0.6-1.2)  mg/dL


 


Estimated GFR (MDRD)     (>89)  


 


Glucose     ()  mg/dL


 


POC Whole Bld Glucose  151 H  111 H   (70 - 100)  mg/dL


 


Lactic Acid    1.0  (0.5-2.2)  mmol/L


 


Calcium     (8.5-10.3)  mg/dL


 


Magnesium     (1.7-2.8)  mg/dL


 


Total Bilirubin     (0.2-1.0)  mg/dL


 


AST     (10-42)  IU/L


 


ALT     (10-60)  IU/L


 


Alkaline Phosphatase     ()  IU/L


 


C-Reactive Protein     (0-1.0)  mg/dL


 


Total Protein     (6.7-8.2)  g/dL


 


Albumin     (3.2-5.5)  g/dL


 


Globulin     (2.1-4.2)  g/dL


 


Albumin/Globulin Ratio     (1.0-2.2)  














  03/10/18 03/10/18 03/10/18 Range/Units





  04:48 04:48 04:48 


 


WBC    8.7  (4.8-10.8)  x10^3/uL


 


RBC    3.06 L  (4.70-6.10)  10^6/uL


 


Hgb    9.6 L  (14.0-18.0)  g/dL


 


Hct    29.4 L  (42.0-52.0)  %


 


MCV    95.9 H  (80.0-94.0)  fL


 


MCH    31.3 H  (27.0-31.0)  pg


 


MCHC    32.6  (32.0-36.0)  g/dL


 


RDW    13.5  (12.0-15.0)  %


 


Plt Count    212  (130-450)  10^3/uL


 


MPV    8.4  (7.4-11.4)  fL


 


Neut #    6.5  (1.5-6.6)  10^3/uL


 


Lymph #    1.0 L  (1.5-3.5)  10^3/uL


 


Mono #    0.8  (0.0-1.0)  10^3/uL


 


Eos #    0.4  (0.0-0.7)  10^3/uL


 


Baso #    0.0  (0.0-0.1)  10^3/uL


 


Absolute Nucleated RBC    0.00  x10^3/uL


 


Nucleated RBC %    0.0  /100WBC


 


ESR  66 H    (0-20)  mm/Hr


 


Sodium   141   (135-145)  mmol/L


 


Potassium   3.5   (3.5-5.0)  mmol/L


 


Chloride   106   (101-111)  mmol/L


 


Carbon Dioxide   27   (21-32)  mmol/L


 


Anion Gap   8.0   (6-13)  


 


BUN   8   (6-20)  mg/dL


 


Creatinine   0.8   (0.6-1.2)  mg/dL


 


Estimated GFR (MDRD)   92   (>89)  


 


Glucose   125 H   ()  mg/dL


 


POC Whole Bld Glucose     (70 - 100)  mg/dL


 


Lactic Acid     (0.5-2.2)  mmol/L


 


Calcium   8.1 L   (8.5-10.3)  mg/dL


 


Magnesium   1.6 L   (1.7-2.8)  mg/dL


 


Total Bilirubin   0.6   (0.2-1.0)  mg/dL


 


AST   12   (10-42)  IU/L


 


ALT   < 10 L   (10-60)  IU/L


 


Alkaline Phosphatase   84   ()  IU/L


 


C-Reactive Protein   16.1 H   (0-1.0)  mg/dL


 


Total Protein   5.6 L   (6.7-8.2)  g/dL


 


Albumin   2.5 L   (3.2-5.5)  g/dL


 


Globulin   3.1   (2.1-4.2)  g/dL


 


Albumin/Globulin Ratio   0.8 L   (1.0-2.2)  














  03/09/18 Range/Units





  16:30 


 


WBC   (4.8-10.8)  x10^3/uL


 


RBC   (4.70-6.10)  10^6/uL


 


Hgb   (14.0-18.0)  g/dL


 


Hct   (42.0-52.0)  %


 


MCV   (80.0-94.0)  fL


 


MCH   (27.0-31.0)  pg


 


MCHC   (32.0-36.0)  g/dL


 


RDW   (12.0-15.0)  %


 


Plt Count   (130-450)  10^3/uL


 


MPV   (7.4-11.4)  fL


 


Neut #   (1.5-6.6)  10^3/uL


 


Lymph #   (1.5-3.5)  10^3/uL


 


Mono #   (0.0-1.0)  10^3/uL


 


Eos #   (0.0-0.7)  10^3/uL


 


Baso #   (0.0-0.1)  10^3/uL


 


Absolute Nucleated RBC   x10^3/uL


 


Nucleated RBC %   /100WBC


 


ESR   (0-20)  mm/Hr


 


Sodium   (135-145)  mmol/L


 


Potassium   (3.5-5.0)  mmol/L


 


Chloride   (101-111)  mmol/L


 


Carbon Dioxide   (21-32)  mmol/L


 


Anion Gap   (6-13)  


 


BUN   (6-20)  mg/dL


 


Creatinine   (0.6-1.2)  mg/dL


 


Estimated GFR (MDRD)   (>89)  


 


Glucose   ()  mg/dL


 


POC Whole Bld Glucose  92  (70 - 100)  mg/dL


 


Lactic Acid   (0.5-2.2)  mmol/L


 


Calcium   (8.5-10.3)  mg/dL


 


Magnesium   (1.7-2.8)  mg/dL


 


Total Bilirubin   (0.2-1.0)  mg/dL


 


AST   (10-42)  IU/L


 


ALT   (10-60)  IU/L


 


Alkaline Phosphatase   ()  IU/L


 


C-Reactive Protein   (0-1.0)  mg/dL


 


Total Protein   (6.7-8.2)  g/dL


 


Albumin   (3.2-5.5)  g/dL


 


Globulin   (2.1-4.2)  g/dL


 


Albumin/Globulin Ratio   (1.0-2.2)  














Assessment/Plan





- Problem List


(1) Sepsis


Impression: 


(1) Sepsis


Conclusion/Plan: 


no fever, no elevated temperature


WBC is down to normal


blood culture negative


UA culture positive for proteus Miralitis


continue Rocephin


plan to D/C to [facility] tomorrow





no fever but elevated temperature, slight elevated WBC


blood culture preliminary show no growth of bacteria, then vancomycin was 

stopped  


order lactic acid


UA culture and sensitive study to Rocephin


discuss with other provider, may resume vanco or add another agent





no fever on last night


WBC decreased


blood culture preliminary show no growth of bacteria


continue antibiotics


continue mild to moderate IVF 


vital and lab continue monitor





pt has fever, review of CXR and CT of abdomen


continue Rocephin and Vancomycin


follow up blood and urine culture


continue IVF


vital, lab continue monitor








Patient has previous history of septic shock during presentation in September 2017 secondary to urinary tract infection.  Today the patient presented with 

fever at [facility] however temperature was only 37.1 on arrival, 

tachycardia with heart rate in the 110s, patient was tachypneic with 

respiratory rate greater than 20 and patient had leukocytosis of greater than 15

,000.  The patient appeared to be quite dry and was hypotensive both at 

carriage and again in the emergency department here.  Source of patient's 

infection appeared to be a urinary tract infection likely due to a indwelling 

Gupta catheter.  Patient did have symptoms of dysuria and fever.  The patient's 

UA was grossly positive and urine in the catheter looked cloudy.





Plan:


Patient will be continued on IV fluids he has been given 2 L in the emergency 

department and will be given an additional 1 L bolus and then placed on 

maintenance fluid


We will monitor patient's blood pressure every 4 hours


Patient will be given IV ceftriaxone and vancomycin given his previous urine 

cultures and we will await his urine culture and blood cultures


We will remove patient's Gupta catheter and replaced with new catheter if 

needed.





(2) UTI (urinary tract infection)


Conclusion/Plan: 


continue Rocephin





UA culture reveals positive Proteus Mirabilis, and sensitive to Rocephin


continue Rocephin


continue vital monitor





continue antibiotics


pt's Gupta was removed, pt has urination, check bladder scan, will follow up


follow up UA culture 





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 

including most recent episode in September 2017 requiring intensive care with 

septic shock.  Today patient appears to be presenting with early sepsis and is 

again found to have a urinary tract infection.  Patient did present with 

symptoms of dysuria and fever.  Patient's urine analysis was grossly positive.  

The patient's Gupta catheter had cloudy urine and it was unclear as to when it 

was last changed.





Plan:


Patient is being started on IV vancomycin and ceftriaxone as patient's previous 

urine cultures grew staph aureus and Enterobacter cloacae which were 

susceptible to vancomycin and ceftriaxone


Urine cultures have been collected and will be followed up


Patient's Gupta catheter has been removed in the emergency department and after 

removal patient was able to urinate therefore we will leave the Gupta catheter 

out and allow for urinary tract infection to be treated.


We will check bladder scans to see if patient does require urinary catheter in 

the future.








(3) COPD (chronic obstructive pulmonary disease)


Conclusion/Plan: 


continue supplement of oxygen


continue breath treatment





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


continue Oxygen supplement


continue INH treatment


vital monitor





Patient has history of COPD and is on 2.5 L of oxygen at home.  Patient has end-

stage COPD and is being followed by palliative care at [facility].  He 

is on duo nebs and Advair at home with which the patient has been stable.





Plan:


Patient will be placed on duo nebs as needed along with budesonide and 

formoterol twice a day


We will continue his home dose of oxygen








(4) BPH (benign prostatic hyperplasia)


Conclusion/Plan: 


continue Flomax


check bladder scan, if pt need Gupta in the future





The patient has history of BPH and has an indwelling Gupta catheter.  The 

patient is also on Flomax at home.


Given the patient's recurrent urinary tract infections and ongoing urinary 

tract infection the patient's current Gupta catheter was removed.  Patient was 

able to void after having the catheter removed therefore a new catheter has not 

yet been placed.  We will monitor to see if patient still needs a Gupta 

catheter.  The patient will get bladder scans throughout the day and if 

residuals are high or he has discomfort we may need to place a new Gupta 

catheter.  The patient will be continued on his home dose of Flomax.








(5) Diabetes


Conclusion/Plan: 


pt's A1C 6.3, glucose is in the good control





Patient has a history of type 2 diabetes but is not currently on any 

medications.  The patient is diet controlled.  The patient's blood glucose on 

presentation the emergency department is in the 140s which is only mildly 

elevated.  Given the patient's urinary tract infection we will try to tightly 

control his blood glucose therefore the patient will be placed on sliding scale 

insulin and we will check a hemoglobin A1c.





(6) Hyponatremia


resolved. Na is 137





(7) urinary retention


bladder scan again, 


plan to d/c without Gupta, since pt does not have urinary retention


US of pelvis 





bladder scan pt had less 100 ml in the bladder. It seems not the issue. 


continue Flomax





(8) urinary incontinence


continue nurse care, support


plan to d/c without Gupta. Gupta cause pt with recurrent UTI with sepsis

## 2018-03-11 LAB
ALBUMIN DIAFP-MCNC: 2.4 G/DL (ref 3.2–5.5)
ALBUMIN/GLOB SERPL: 0.8 {RATIO} (ref 1–2.2)
ALP SERPL-CCNC: 81 IU/L (ref 42–121)
ALT SERPL W P-5'-P-CCNC: < 10 IU/L (ref 10–60)
ANION GAP SERPL CALCULATED.4IONS-SCNC: 6 MMOL/L (ref 6–13)
AST SERPL W P-5'-P-CCNC: 11 IU/L (ref 10–42)
BASOPHILS NFR BLD AUTO: 0 10^3/UL (ref 0–0.1)
BASOPHILS NFR BLD AUTO: 0.3 %
BILIRUB BLD-MCNC: 0.4 MG/DL (ref 0.2–1)
BUN SERPL-MCNC: 5 MG/DL (ref 6–20)
CALCIUM UR-MCNC: 8 MG/DL (ref 8.5–10.3)
CHLORIDE SERPL-SCNC: 108 MMOL/L (ref 101–111)
CO2 SERPL-SCNC: 27 MMOL/L (ref 21–32)
CREAT SERPLBLD-SCNC: 0.6 MG/DL (ref 0.6–1.2)
EOSINOPHIL # BLD AUTO: 0.4 10^3/UL (ref 0–0.7)
EOSINOPHIL NFR BLD AUTO: 4.5 %
ERYTHROCYTE [DISTWIDTH] IN BLOOD BY AUTOMATED COUNT: 13.4 % (ref 12–15)
GFRSERPLBLD MDRD-ARVRAT: 128 ML/MIN/{1.73_M2} (ref 89–?)
GLOBULIN SER-MCNC: 3.1 G/DL (ref 2.1–4.2)
GLUCOSE SERPL-MCNC: 137 MG/DL (ref 70–100)
HGB UR QL STRIP: 9.8 G/DL (ref 14–18)
LYMPHOCYTES # SPEC AUTO: 0.9 10^3/UL (ref 1.5–3.5)
LYMPHOCYTES NFR BLD AUTO: 10.8 %
MCH RBC QN AUTO: 31 PG (ref 27–31)
MCHC RBC AUTO-ENTMCNC: 32.6 G/DL (ref 32–36)
MCV RBC AUTO: 95 FL (ref 80–94)
MONOCYTES # BLD AUTO: 0.8 10^3/UL (ref 0–1)
MONOCYTES NFR BLD AUTO: 9.1 %
NEUTROPHILS # BLD AUTO: 6.5 10^3/UL (ref 1.5–6.6)
NEUTROPHILS # SNV AUTO: 8.6 X10^3/UL (ref 4.8–10.8)
NEUTROPHILS NFR BLD AUTO: 75.3 %
PDW BLD AUTO: 8.3 FL (ref 7.4–11.4)
PLATELET # BLD: 250 10^3/UL (ref 130–450)
PROT SPEC-MCNC: 5.5 G/DL (ref 6.7–8.2)
RBC MAR: 3.16 10^6/UL (ref 4.7–6.1)
SODIUM SERPLBLD-SCNC: 141 MMOL/L (ref 135–145)

## 2018-03-11 RX ADMIN — IPRATROPIUM BROMIDE AND ALBUTEROL SULFATE PRN ML: 2.5; .5 SOLUTION RESPIRATORY (INHALATION) at 07:08

## 2018-03-11 RX ADMIN — INSULIN ASPART SCH UNIT: 100 INJECTION, SOLUTION INTRAVENOUS; SUBCUTANEOUS at 12:06

## 2018-03-11 RX ADMIN — CLOPIDOGREL BISULFATE SCH MG: 75 TABLET ORAL at 09:09

## 2018-03-11 RX ADMIN — METHYLPREDNISOLONE SODIUM SUCCINATE SCH MG: 40 INJECTION, POWDER, FOR SOLUTION INTRAMUSCULAR; INTRAVENOUS at 09:28

## 2018-03-11 RX ADMIN — INSULIN ASPART SCH UNIT: 100 INJECTION, SOLUTION INTRAVENOUS; SUBCUTANEOUS at 09:08

## 2018-03-11 RX ADMIN — METHYLPREDNISOLONE SODIUM SUCCINATE SCH MG: 40 INJECTION, POWDER, FOR SOLUTION INTRAMUSCULAR; INTRAVENOUS at 13:18

## 2018-03-11 RX ADMIN — INSULIN ASPART SCH UNIT: 100 INJECTION, SOLUTION INTRAVENOUS; SUBCUTANEOUS at 20:29

## 2018-03-11 RX ADMIN — BUDESONIDE SCH MG: 0.5 SUSPENSION RESPIRATORY (INHALATION) at 07:08

## 2018-03-11 RX ADMIN — TAMSULOSIN HYDROCHLORIDE SCH MG: 0.4 CAPSULE ORAL at 20:29

## 2018-03-11 RX ADMIN — SODIUM CHLORIDE SCH MLS/HR: 9 INJECTION, SOLUTION INTRAVENOUS at 12:07

## 2018-03-11 RX ADMIN — GABAPENTIN SCH MG: 100 CAPSULE ORAL at 20:29

## 2018-03-11 RX ADMIN — SODIUM CHLORIDE, PRESERVATIVE FREE PRN ML: 5 INJECTION INTRAVENOUS at 09:28

## 2018-03-11 RX ADMIN — ENOXAPARIN SODIUM SCH MG: 100 INJECTION SUBCUTANEOUS at 09:10

## 2018-03-11 RX ADMIN — IPRATROPIUM BROMIDE AND ALBUTEROL SULFATE PRN ML: 2.5; .5 SOLUTION RESPIRATORY (INHALATION) at 10:45

## 2018-03-11 RX ADMIN — SODIUM CHLORIDE SCH MLS/HR: 900 INJECTION INTRAVENOUS at 03:17

## 2018-03-11 RX ADMIN — FAMOTIDINE SCH MG: 20 TABLET, FILM COATED ORAL at 09:09

## 2018-03-11 RX ADMIN — FORMOTEROL FUMARATE DIHYDRATE SCH MCG: 20 SOLUTION RESPIRATORY (INHALATION) at 07:08

## 2018-03-11 RX ADMIN — SODIUM CHLORIDE, PRESERVATIVE FREE PRN ML: 5 INJECTION INTRAVENOUS at 13:18

## 2018-03-11 RX ADMIN — SODIUM CHLORIDE, PRESERVATIVE FREE SCH: 5 INJECTION INTRAVENOUS at 16:33

## 2018-03-11 RX ADMIN — SODIUM CHLORIDE, PRESERVATIVE FREE SCH ML: 5 INJECTION INTRAVENOUS at 09:10

## 2018-03-11 RX ADMIN — DEXTROSE AND SODIUM CHLORIDE SCH MLS/HR: 5; 900 INJECTION, SOLUTION INTRAVENOUS at 12:08

## 2018-03-11 RX ADMIN — Medication SCH MG: at 09:09

## 2018-03-11 RX ADMIN — LATANOPROST SCH DROPS: 50 SOLUTION OPHTHALMIC at 20:29

## 2018-03-11 RX ADMIN — Medication SCH MG: at 17:13

## 2018-03-11 RX ADMIN — POLYETHYLENE GLYCOL 3350 SCH GM: 17 POWDER, FOR SOLUTION ORAL at 09:10

## 2018-03-11 RX ADMIN — MIRTAZAPINE SCH MG: 15 TABLET, FILM COATED ORAL at 20:29

## 2018-03-11 RX ADMIN — GUAIFENESIN PRN MG: 100 SOLUTION ORAL at 03:34

## 2018-03-11 RX ADMIN — BUDESONIDE SCH MG: 0.5 SUSPENSION RESPIRATORY (INHALATION) at 17:57

## 2018-03-11 RX ADMIN — METHYLPREDNISOLONE SODIUM SUCCINATE SCH MG: 40 INJECTION, POWDER, FOR SOLUTION INTRAMUSCULAR; INTRAVENOUS at 21:15

## 2018-03-11 RX ADMIN — IPRATROPIUM BROMIDE AND ALBUTEROL SULFATE PRN ML: 2.5; .5 SOLUTION RESPIRATORY (INHALATION) at 17:56

## 2018-03-11 RX ADMIN — FORMOTEROL FUMARATE DIHYDRATE SCH MCG: 20 SOLUTION RESPIRATORY (INHALATION) at 17:56

## 2018-03-11 RX ADMIN — SODIUM CHLORIDE SCH MLS/HR: 9 INJECTION, SOLUTION INTRAVENOUS at 21:15

## 2018-03-11 RX ADMIN — INSULIN ASPART SCH UNIT: 100 INJECTION, SOLUTION INTRAVENOUS; SUBCUTANEOUS at 17:12

## 2018-03-11 NOTE — PROVIDER PROGRESS NOTE
Subjective





- Prog Note Date


Prog Note Date: 03/11/18





- Subjective


Pt reports feeling: Worse


Subjective: 


pt develop wheezing. pt is still confused. No fever, chill, chest pain, cough 

reported








Current Medications





- Current Medications


Current Medications: 





Active Medications





Acetaminophen (Tylenol)  650 mg PO Q4HR PRN


   PRN Reason: Pain 1 to 4


   Last Admin: 03/10/18 19:17 Dose:  650 mg


Albuterol/Ipratropium (Duoneb)  3 ml INH Q4HR PRN


   PRN Reason: Wheezing


   Last Admin: 03/11/18 10:45 Dose:  3 ml


Budesonide (Pulmicort)  0.5 mg INH RTBID DON


   Last Admin: 03/11/18 07:08 Dose:  0.5 mg


Carboxymethylcellulose (Refresh 1% Ophth Drops)  1 drops EACHEYE PRN PRN


   PRN Reason: Dry Eye


   Last Admin: 03/09/18 15:07 Dose:  1 drops


Clopidogrel Bisulfate (Plavix)  75 mg PO DAILY [facility]


   Last Admin: 03/11/18 09:09 Dose:  75 mg


Enoxaparin Sodium (Lovenox)  40 mg SUBQ DAILY [facility]


   Last Admin: 03/11/18 09:10 Dose:  40 mg


Famotidine (Pepcid)  20 mg PO DAILY [facility]


   Last Admin: 03/11/18 09:09 Dose:  20 mg


Formoterol Fumarate (Perforomist)  20 mcg INH RTBID [facility]


   Last Admin: 03/11/18 07:08 Dose:  20 mcg


Gabapentin (Neurontin)  200 mg PO HS [facility]


   Last Admin: 03/10/18 19:15 Dose:  200 mg


Guaifenesin (Robitussin Liquid)  200 mg PO Q4H PRN


   PRN Reason: Cough


   Last Admin: 03/11/18 03:34 Dose:  200 mg


Ceftriaxone Sodium 2 gm/ (Sodium Chloride)  100 mls @ 200 mls/hr IV Q24H [facility]


   Last Admin: 03/11/18 03:17 Dose:  200 mls/hr


Dextrose/Sodium Chloride (D5ns)  1,000 mls @ 83.333 mls/hr IV .Q12H DON


   Last Admin: 03/11/18 12:08 Dose:  83.333 mls/hr


Azithromycin 500 mg/ Sodium (Chloride)  250 mls @ 250 mls/hr IV DAILY [facility]


   Last Admin: 03/11/18 12:07 Dose:  250 mls/hr


Insulin Aspart (Novolog)  1 - 5 unit SUBQ 0800,1200,1700,2100 [facility]


   PRN Reason: Protocol


   Last Admin: 03/11/18 12:06 Dose:  1 unit


Latanoprost (Xalatan Ophth Drops)  0 drops EACHEYE QPM [facility]


   Last Admin: 03/10/18 19:47 Dose:  1 drops


Levalbuterol HCl (Xopenex)  1.25 mg INH Q4H PRN


   PRN Reason: Shortness of Air/Wheezing


Methylprednisolone (Solu-Medrol (40mg Vial))  40 mg IVP TID [facility]


   Last Admin: 03/11/18 09:28 Dose:  40 mg


Mirtazapine (Remeron)  15 mg PO QPM [facility]


   Last Admin: 03/10/18 19:16 Dose:  15 mg


Morphine Sulfate (Morphine (Carpuject))  2 mg IVP Q4HR PRN


   PRN Reason: PAIN


   Last Admin: 03/10/18 17:31 Dose:  2 mg


Ondansetron HCl (Zofran Odt)  4 mg TL Q6HR PRN


   PRN Reason: Nausea / Vomiting


Oxycodone HCl (Roxicodone)  5 mg PO Q4HR PRN


   PRN Reason: Pain 5 to 7


   Last Admin: 03/10/18 15:05 Dose:  5 mg


Polyethylene Glycol (Miralax)  17 gm PO DAILY [facility]


   Last Admin: 03/11/18 09:10 Dose:  17 gm


Prochlorperazine Edisylate (Compazine Inj)  10 mg IVP Q6HR PRN


   PRN Reason: Nausea / Vomiting


Saccharomyces Boulardii (Florastor)  250 mg PO BIDWM [facility]


   Last Admin: 03/11/18 09:09 Dose:  250 mg


Simethicone (Mylicon)  80 mg PO 0900,1300,1800,2100 PRN


   PRN Reason: Gas


Sodium Chloride (Normal Saline Flush 0.9%)  10 ml IVP PRN PRN


   PRN Reason: AS NEEDED PER PROVIDER ORDERS


   Last Admin: 03/11/18 09:28 Dose:  10 ml


Sodium Chloride (Normal Saline Flush 0.9%)  10 ml IVP 0100,0900,1700 [facility]


   Last Admin: 03/11/18 09:10 Dose:  10 ml


Tamsulosin HCl (Flomax)  0.4 mg PO QPM [facility]


   Last Admin: 03/10/18 19:16 Dose:  0.4 mg





 





Acetaminophen 650 mg PO Q6H PRN 11/26/16 


Fluticasone/Salmeterol [Advair 100-50 Diskus] 1 inh INH BID 11/26/16 


Ipratropium/Albuterol [Duoneb] 3 ml INH Q6H PRN 07/22/17 


Mirtazapine [Remeron] 15 mg PO QPM 07/22/17 


Tamsulosin HCl [Flomax] 0.4 mg PO QPM@1800 07/22/17 


Guaifenesin [Child Mucinex Chest Congestion] 10 ml PO Q4H PRN 11/10/17 


Cholecalciferol [Vitamin D3] 5,000 unit PO FR@0800 01/25/18 


Senna [Senokot] 1 tab PO DAILY@1200 01/26/18 


Gabapentin 200 mg PO QPM 02/26/18 


Gabapentin [Neurontin] 200 mg PO 0700,1400 03/07/18 











Objective





- Vital Signs/Intake & Output


Reviewed Vital Signs: Yes


Vital Signs: 


 Vital Signs x48h











  Temp Pulse Pulse Resp BP Pulse Ox


 


 03/11/18 10:46   85   24  


 


 03/11/18 09:52  37.1 C   85  19  154/63 H  94


 


 03/11/18 07:09   100   24  


 


 03/11/18 05:00  37.0 C   82  18  146/60 H  98











Intake & Output: 


 Intake & Output











 03/08/18 03/09/18 03/10/18 03/12/18





 23:59 23:59 23:59 00:59


 


Intake Total 3250 2919.997 2295.83 1060


 


Balance 3250 2919.997 2295.83 1060














- Objective


General Appearance: positive: No acute distress, Alert.  negative: Lethargic


Eyes Bilateral: positive: Normal inspection, PERRL, No lid inflammation, 

Conjunctivae nml


ENT: positive: ENT inspection nml, Pharynx nml, No signs of dehydration.  

negative: Purulent nasal drainage, Pharyngeal erythema, Oral lesions


Neck: positive: Nml inspection, Thyroid nml, No JVD, Trachea midline.  negative

: Thyromegaly, Lymphadenopathy (R), Lymphadenopathy (L), Stiff neck, Carotid 

bruit, Swelling/bruising, Tracheal deviation


Respiratory: positive: Chest non-tender, No respiratory distress, Wheezes.  

negative: Rales, Rhonchi


Cardiovascular: positive: Regular rate & rhythm, No murmur, No gallop.  negative

: Irregularly irregular, Extrasystoles, Tachycardia, Bradycardia, Systolic 

murmur, Diastolic murmur


Peripheral Pulses: 2+ Radial (R), 2+ Radial (L), 2+ Dorsalis pedis (R), 2+ 

Dorsalis pedis (L)


Abdomen: positive: Non-tender, No organomegaly, Nml bowel sounds, No 

distention.  negative: Tenderness, Guarding, Rebound


Back: positive: Nml inspection.  negative: CVA tenderness (R), CVA tenderness (L

)


Skin: positive: Color nml, No rash, Warm, Dry.  negative: Cyanosis, Diaphoresis

, Pallor


Extremities: positive: Non-tender, Full ROM, Nml appearance.  negative: Calf 

tenderness, Joint swelling, Glenn's sign/cords


Neurologic/Psychiatric: positive: Sensation nml.  negative: Mood/affect nml, 

Sensory loss, Facial droop, Slurred/abnml speech, Depressed mood/affect





- Lab Results


Fish Bones: 


 03/11/18 05:00





 03/11/18 05:00


Other Labs: 


 Lab Results x24hrs











  03/11/18 03/11/18 03/11/18 Range/Units





  11:41 08:15 05:00 


 


WBC     (4.8-10.8)  x10^3/uL


 


RBC     (4.70-6.10)  10^6/uL


 


Hgb     (14.0-18.0)  g/dL


 


Hct     (42.0-52.0)  %


 


MCV     (80.0-94.0)  fL


 


MCH     (27.0-31.0)  pg


 


MCHC     (32.0-36.0)  g/dL


 


RDW     (12.0-15.0)  %


 


Plt Count     (130-450)  10^3/uL


 


MPV     (7.4-11.4)  fL


 


Neut #     (1.5-6.6)  10^3/uL


 


Lymph #     (1.5-3.5)  10^3/uL


 


Mono #     (0.0-1.0)  10^3/uL


 


Eos #     (0.0-0.7)  10^3/uL


 


Baso #     (0.0-0.1)  10^3/uL


 


Absolute Nucleated RBC     x10^3/uL


 


Nucleated RBC %     /100WBC


 


Sodium    141  (135-145)  mmol/L


 


Potassium    3.1 L  (3.5-5.0)  mmol/L


 


Chloride    108  (101-111)  mmol/L


 


Carbon Dioxide    27  (21-32)  mmol/L


 


Anion Gap    6.0  (6-13)  


 


BUN    5 L  (6-20)  mg/dL


 


Creatinine    0.6  (0.6-1.2)  mg/dL


 


Estimated GFR (MDRD)    128  (>89)  


 


Glucose    137 H  ()  mg/dL


 


POC Whole Bld Glucose  154 H  143 H   (70 - 100)  mg/dL


 


Calcium    8.0 L  (8.5-10.3)  mg/dL


 


Total Bilirubin    0.4  (0.2-1.0)  mg/dL


 


AST    11  (10-42)  IU/L


 


ALT    < 10 L  (10-60)  IU/L


 


Alkaline Phosphatase    81  ()  IU/L


 


Total Protein    5.5 L  (6.7-8.2)  g/dL


 


Albumin    2.4 L  (3.2-5.5)  g/dL


 


Globulin    3.1  (2.1-4.2)  g/dL


 


Albumin/Globulin Ratio    0.8 L  (1.0-2.2)  














  03/11/18 03/10/18 03/10/18 Range/Units





  05:00 20:51 17:02 


 


WBC  8.6    (4.8-10.8)  x10^3/uL


 


RBC  3.16 L    (4.70-6.10)  10^6/uL


 


Hgb  9.8 L    (14.0-18.0)  g/dL


 


Hct  30.1 L    (42.0-52.0)  %


 


MCV  95.0 H    (80.0-94.0)  fL


 


MCH  31.0    (27.0-31.0)  pg


 


MCHC  32.6    (32.0-36.0)  g/dL


 


RDW  13.4    (12.0-15.0)  %


 


Plt Count  250    (130-450)  10^3/uL


 


MPV  8.3    (7.4-11.4)  fL


 


Neut #  6.5    (1.5-6.6)  10^3/uL


 


Lymph #  0.9 L    (1.5-3.5)  10^3/uL


 


Mono #  0.8    (0.0-1.0)  10^3/uL


 


Eos #  0.4    (0.0-0.7)  10^3/uL


 


Baso #  0.0    (0.0-0.1)  10^3/uL


 


Absolute Nucleated RBC  0.01    x10^3/uL


 


Nucleated RBC %  0.1    /100WBC


 


Sodium     (135-145)  mmol/L


 


Potassium     (3.5-5.0)  mmol/L


 


Chloride     (101-111)  mmol/L


 


Carbon Dioxide     (21-32)  mmol/L


 


Anion Gap     (6-13)  


 


BUN     (6-20)  mg/dL


 


Creatinine     (0.6-1.2)  mg/dL


 


Estimated GFR (MDRD)     (>89)  


 


Glucose     ()  mg/dL


 


POC Whole Bld Glucose   146 H  119 H  (70 - 100)  mg/dL


 


Calcium     (8.5-10.3)  mg/dL


 


Total Bilirubin     (0.2-1.0)  mg/dL


 


AST     (10-42)  IU/L


 


ALT     (10-60)  IU/L


 


Alkaline Phosphatase     ()  IU/L


 


Total Protein     (6.7-8.2)  g/dL


 


Albumin     (3.2-5.5)  g/dL


 


Globulin     (2.1-4.2)  g/dL


 


Albumin/Globulin Ratio     (1.0-2.2)  














Assessment/Plan





- Problem List


(1) Sepsis


Impression: 


(1) wheezing and pneumonia


pt develop wheezing at all lobes, slight lower Sats of O2


CXR ordered. CXR reveals increased consolidation in the right lower lung


Xopenex


mild solu-metro


antibiotics Azithymycin and Rocephin 








(2) Sepsis


Conclusion/Plan: 


no fever, no elevated temperature, WBC is down to normal, blood culture negative


continue antibiotics





no fever, no elevated temperature


WBC is down to normal


blood culture negative


UA culture positive for proteus Miralitis


continue Rocephin


plan to D/C to [facility] tomorrow





no fever but elevated temperature, slight elevated WBC


blood culture preliminary show no growth of bacteria, then vancomycin was 

stopped  


order lactic acid


UA culture and sensitive study to Rocephin


discuss with other provider, may resume vanco or add another agent





no fever on last night


WBC decreased


blood culture preliminary show no growth of bacteria


continue antibiotics


continue mild to moderate IVF 


vital and lab continue monitor





pt has fever, review of CXR and CT of abdomen


continue Rocephin and Vancomycin


follow up blood and urine culture


continue IVF


vital, lab continue monitor








Patient has previous history of septic shock during presentation in September 2017 secondary to urinary tract infection.  Today the patient presented with 

fever at [facility] however temperature was only 37.1 on arrival, 

tachycardia with heart rate in the 110s, patient was tachypneic with 

respiratory rate greater than 20 and patient had leukocytosis of greater than 15

,000.  The patient appeared to be quite dry and was hypotensive both at 

carriage and again in the emergency department here.  Source of patient's 

infection appeared to be a urinary tract infection likely due to a indwelling 

Gupta catheter.  Patient did have symptoms of dysuria and fever.  The patient's 

UA was grossly positive and urine in the catheter looked cloudy.





Plan:


Patient will be continued on IV fluids he has been given 2 L in the emergency 

department and will be given an additional 1 L bolus and then placed on 

maintenance fluid


We will monitor patient's blood pressure every 4 hours


Patient will be given IV ceftriaxone and vancomycin given his previous urine 

cultures and we will await his urine culture and blood cultures


We will remove patient's Gupta catheter and replaced with new catheter if 

needed.





(3) UTI (urinary tract infection)


Conclusion/Plan: 


continue Rocephin





UA culture reveals positive Proteus Mirabilis, and sensitive to Rocephin


continue Rocephin


continue vital monitor





continue antibiotics


pt's Gupta was removed, pt has urination, check bladder scan, will follow up


follow up UA culture 





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 

including most recent episode in September 2017 requiring intensive care with 

septic shock.  Today patient appears to be presenting with early sepsis and is 

again found to have a urinary tract infection.  Patient did present with 

symptoms of dysuria and fever.  Patient's urine analysis was grossly positive.  

The patient's Gupta catheter had cloudy urine and it was unclear as to when it 

was last changed.





Plan:


Patient is being started on IV vancomycin and ceftriaxone as patient's previous 

urine cultures grew staph aureus and Enterobacter cloacae which were 

susceptible to vancomycin and ceftriaxone


Urine cultures have been collected and will be followed up


Patient's Gupta catheter has been removed in the emergency department and after 

removal patient was able to urinate therefore we will leave the Gupta catheter 

out and allow for urinary tract infection to be treated.


We will check bladder scans to see if patient does require urinary catheter in 

the future.








(4) COPD (chronic obstructive pulmonary disease)


Conclusion/Plan: 


continue supplement of oxygen


continue breath treatment





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


continue Oxygen supplement


continue INH treatment


vital monitor





Patient has history of COPD and is on 2.5 L of oxygen at home.  Patient has end-

stage COPD and is being followed by palliative care at [facility].  He 

is on duo nebs and Advair at home with which the patient has been stable.





Plan:


Patient will be placed on duo nebs as needed along with budesonide and 

formoterol twice a day


We will continue his home dose of oxygen








(5) BPH (benign prostatic hyperplasia)


Conclusion/Plan: 


continue Flomax


check bladder scan, if pt need Gupta in the future





The patient has history of BPH and has an indwelling Gupta catheter.  The 

patient is also on Flomax at home.


Given the patient's recurrent urinary tract infections and ongoing urinary 

tract infection the patient's current Gupta catheter was removed.  Patient was 

able to void after having the catheter removed therefore a new catheter has not 

yet been placed.  We will monitor to see if patient still needs a Gupta 

catheter.  The patient will get bladder scans throughout the day and if 

residuals are high or he has discomfort we may need to place a new Gupta 

catheter.  The patient will be continued on his home dose of Flomax.








(6) Diabetes


Conclusion/Plan: 


pt's A1C 6.3, glucose is in the good control





Patient has a history of type 2 diabetes but is not currently on any 

medications.  The patient is diet controlled.  The patient's blood glucose on 

presentation the emergency department is in the 140s which is only mildly 

elevated.  Given the patient's urinary tract infection we will try to tightly 

control his blood glucose therefore the patient will be placed on sliding scale 

insulin and we will check a hemoglobin A1c.





(7) Hyponatremia


resolved. Na is 137





(8) urinary retention


twice of bladder scan reveal 104/105 ml as residence


plan d/c without Gupta, since pt is bed-bend, limited fall risk





bladder scan again, 


plan to d/c without Gupta, since pt does not have urinary retention


US of pelvis 





bladder scan pt had less 100 ml in the bladder. It seems not the issue. 


continue Flomax





(9) urinary incontinence


continue nurse care, support


plan to d/c without Gupta. Gupta cause pt with recurrent UTI with sepsis

## 2018-03-11 NOTE — ULTRASOUND REPORT
EXAM:

RENAL ULTRASOUND

 

EXAM DATE: 3/10/2018 08:01 PM.

 

CLINICAL HISTORY: Hydronephrosis. Kidney stones.

 

COMPARISON: CT 03/07/2018.

 

TECHNIQUE: Real-time scanning was performed with static images obtained. 

 

FINDINGS:

Right Kidney: 10.3 x 5.5 x 6.3 cm. 11 mm hilar calcification corresponding to the CT abnormality. Nor
mal echotexture with no contour-deforming masses or hydronephrosis.

 

Left Kidney: 11.3 x 4.7 x 6.4 cm.   Normal echotexture with no stones, contour-deforming masses, or h
ydronephrosis. 

 

Bladder: Bilateral jets seen. The prevoid bladder volume was 54 cc. The patient was unable to void.

 

Other: None. 

 

IMPRESSION: No hydronephrosis on either side. Right renal calcification corresponding to the CT abnor
mality.

 

[facility]

Referring Provider Line: [phone]

 

SITE ID: [id number]

## 2018-03-11 NOTE — XRAY REPORT
EXAM: 

CHEST RADIOGRAPHY

 

EXAM DATE: 3/11/2018 09:23 AM.

 

CLINICAL HISTORY: Wheezing, shortness of breath.

 

COMPARISON: 03/07/2018.

 

TECHNIQUE: 1 view.

 

FINDINGS:

Lungs/Pleura: Increased consolidation in the right lower lung could represent pneumonia. No large ple
ural effusion.

 

Mediastinum: Within exam limitations, the cardiomediastinal contour is normal.

 

Other: None.

 

IMPRESSION: 

Increased consolidation in the right lower lung could represent pneumonia. Follow-up imaging is recom
mended.

 

[facility]

Referring Provider Line: [phone]

 

SITE ID: [id number]

## 2018-03-12 VITALS — DIASTOLIC BLOOD PRESSURE: 66 MMHG | SYSTOLIC BLOOD PRESSURE: 145 MMHG

## 2018-03-12 LAB
ALBUMIN DIAFP-MCNC: 2.5 G/DL (ref 3.2–5.5)
ALBUMIN/GLOB SERPL: 0.8 {RATIO} (ref 1–2.2)
ALP SERPL-CCNC: 74 IU/L (ref 42–121)
ALT SERPL W P-5'-P-CCNC: < 10 IU/L (ref 10–60)
ANION GAP SERPL CALCULATED.4IONS-SCNC: 8 MMOL/L (ref 6–13)
AST SERPL W P-5'-P-CCNC: 10 IU/L (ref 10–42)
BASOPHILS NFR BLD AUTO: 0 10^3/UL (ref 0–0.1)
BASOPHILS NFR BLD AUTO: 0.2 %
BILIRUB BLD-MCNC: 0.4 MG/DL (ref 0.2–1)
BUN SERPL-MCNC: 10 MG/DL (ref 6–20)
CALCIUM UR-MCNC: 8.3 MG/DL (ref 8.5–10.3)
CHLORIDE SERPL-SCNC: 109 MMOL/L (ref 101–111)
CO2 SERPL-SCNC: 25 MMOL/L (ref 21–32)
CREAT SERPLBLD-SCNC: 0.7 MG/DL (ref 0.6–1.2)
EOSINOPHIL # BLD AUTO: 0 10^3/UL (ref 0–0.7)
EOSINOPHIL NFR BLD AUTO: 0 %
ERYTHROCYTE [DISTWIDTH] IN BLOOD BY AUTOMATED COUNT: 13.5 % (ref 12–15)
GFRSERPLBLD MDRD-ARVRAT: 107 ML/MIN/{1.73_M2} (ref 89–?)
GLOBULIN SER-MCNC: 3.2 G/DL (ref 2.1–4.2)
GLUCOSE SERPL-MCNC: 207 MG/DL (ref 70–100)
HGB UR QL STRIP: 10.2 G/DL (ref 14–18)
LYMPHOCYTES # SPEC AUTO: 0.6 10^3/UL (ref 1.5–3.5)
LYMPHOCYTES NFR BLD AUTO: 6.6 %
MAGNESIUM SERPL-MCNC: 1.6 MG/DL (ref 1.7–2.8)
MCH RBC QN AUTO: 31.9 PG (ref 27–31)
MCHC RBC AUTO-ENTMCNC: 33.6 G/DL (ref 32–36)
MCV RBC AUTO: 94.9 FL (ref 80–94)
MONOCYTES # BLD AUTO: 0.3 10^3/UL (ref 0–1)
MONOCYTES NFR BLD AUTO: 3.2 %
NEUTROPHILS # BLD AUTO: 8.4 10^3/UL (ref 1.5–6.6)
NEUTROPHILS # SNV AUTO: 9.4 X10^3/UL (ref 4.8–10.8)
NEUTROPHILS NFR BLD AUTO: 90 %
PDW BLD AUTO: 8.3 FL (ref 7.4–11.4)
PLATELET # BLD: 284 10^3/UL (ref 130–450)
PROT SPEC-MCNC: 5.7 G/DL (ref 6.7–8.2)
RBC MAR: 3.19 10^6/UL (ref 4.7–6.1)
SODIUM SERPLBLD-SCNC: 142 MMOL/L (ref 135–145)

## 2018-03-12 RX ADMIN — FORMOTEROL FUMARATE DIHYDRATE SCH MCG: 20 SOLUTION RESPIRATORY (INHALATION) at 05:59

## 2018-03-12 RX ADMIN — POLYETHYLENE GLYCOL 3350 SCH GM: 17 POWDER, FOR SOLUTION ORAL at 10:30

## 2018-03-12 RX ADMIN — BUDESONIDE SCH MG: 0.5 SUSPENSION RESPIRATORY (INHALATION) at 05:59

## 2018-03-12 RX ADMIN — SODIUM CHLORIDE, PRESERVATIVE FREE SCH: 5 INJECTION INTRAVENOUS at 10:32

## 2018-03-12 RX ADMIN — METHYLPREDNISOLONE SODIUM SUCCINATE SCH MG: 40 INJECTION, POWDER, FOR SOLUTION INTRAMUSCULAR; INTRAVENOUS at 06:12

## 2018-03-12 RX ADMIN — SODIUM CHLORIDE SCH MLS/HR: 9 INJECTION, SOLUTION INTRAVENOUS at 10:57

## 2018-03-12 RX ADMIN — INSULIN ASPART SCH UNIT: 100 INJECTION, SOLUTION INTRAVENOUS; SUBCUTANEOUS at 12:16

## 2018-03-12 RX ADMIN — SODIUM CHLORIDE SCH MLS/HR: 900 INJECTION INTRAVENOUS at 02:13

## 2018-03-12 RX ADMIN — ENOXAPARIN SODIUM SCH MG: 100 INJECTION SUBCUTANEOUS at 10:30

## 2018-03-12 RX ADMIN — FAMOTIDINE SCH MG: 20 TABLET, FILM COATED ORAL at 10:29

## 2018-03-12 RX ADMIN — CLOPIDOGREL BISULFATE SCH MG: 75 TABLET ORAL at 10:30

## 2018-03-12 RX ADMIN — Medication SCH MG: at 10:29

## 2018-03-12 RX ADMIN — SODIUM CHLORIDE SCH MLS/HR: 9 INJECTION, SOLUTION INTRAVENOUS at 10:33

## 2018-03-12 RX ADMIN — INSULIN ASPART SCH UNIT: 100 INJECTION, SOLUTION INTRAVENOUS; SUBCUTANEOUS at 10:32

## 2018-03-12 RX ADMIN — SODIUM CHLORIDE, PRESERVATIVE FREE SCH: 5 INJECTION INTRAVENOUS at 01:43

## 2018-03-12 RX ADMIN — IPRATROPIUM BROMIDE AND ALBUTEROL SULFATE PRN ML: 2.5; .5 SOLUTION RESPIRATORY (INHALATION) at 05:58

## 2018-03-12 NOTE — DISCHARGE SUMMARY
Discharge Summary


Discharge Date: 03/12/18


Discharging Provider: SEBASTIÁN ESPINOZA


Primary Care Provider: 


Condition at Discharge: Stable


Discharge Disposition: 03 SNF DC/Xfer


Discharge Facility Name: [facility]





- DIAGNOSES


Admission Diagnoses: 


(1) Sepsis


 


(2) UTI (urinary tract infection)





(3) COPD (chronic obstructive pulmonary disease)





(4) BPH (benign prostatic hyperplasia)





(5) Diabetes





(6) Hyponatremia








Discharge Diagnoses with Status of Each Condition: 


(1) wheezing and pneumonia


great improved, continue Cipro for pneumonia


(2) Sepsis


resolved. normal temperature, normal WBC, normal HR


(3) UTI (urinary tract infection)


continue Cipro, no Gupta


(4) COPD (chronic obstructive pulmonary disease)


stable, continue home meds regime, continue supplement of O2





(5) BPH (benign prostatic hyperplasia)


stable, continue Flomax





(6) Diabetes


A1C6.3, pt's glucose is good control, no home insulin.


(7) Hyponatremia


resolved





(8) urinary retention


mild, two bladder scale reveals around 100cc


d/c Gupta for prevention of recurrent UTI, causing Sepsis





(9) urinary incontinence


continue nurse care, and support








- HPI


History of Present Illness: 


refer from 's HPI from 03/07/18


Patient is an 84-year-old gentleman with a past medical history significant for 

COPD on 2.5 L of oxygen, hypertension, hyperlipidemia, type 2 diabetes diet-

controlled, chronic back pain, cataracts, BPH with urinary retention and a 

chronic indwelling Gupta catheter who has had 2 hospitalizations within the 

last year for sepsis secondary to urinary tract infection once in July 2017 and 

again in September 2017 during his most recent hospitalization the patient was 

in the ICU and in septic shock requiring Levophed.  The patient presents to the 

emergency department today from [facility] where he is currently 

living with a chief complaint of fever and dysuria.  According to records the 

staff at [facility] sent him over to the emergency department early 

this morning as the patient was hypotensive, tachycardic and had a low-grade 

fever.  According to the patient himself he states that he has been having 

fevers and chills over the last week and has been experiencing dysuria with 

soreness around his penis.  The patient has not noted any changes in his urine 

in the catheter however he states he does not really look at the urine in the 

catheter.  The patient denies any abdominal pain, nausea, flank pain, dizziness 

or any generalized weakness.





The patient denies any headaches, blurred vision, runny nose, sore throat, 

nasal congestion, cough, shortness of air, orthopnea, PND, increased lower 

extremity swelling, palpitations, chest pain, diarrhea, constipation, joint pain

, joint swelling, muscle aches, neck stiffness, recent unintentional weight 

loss or any new changes in appetite.  The patient also denies any focal 

neurologic deficits.





The patient's functional status is very limited he does not ambulate and has 

difficulty swallowing requiring a dysphagia diet.





On presentation to the emergency department the patient was afebrile with a 

temperature of 37.1, tachycardic with heart rate of 110, initially blood 

pressure was normotensive but later blood pressure dropped to 97/58 and patient 

was tachypneic with respiratory rate of 28.  The patient was saturating well on 

his home O2.  The patient underwent routine lab work which did reveal a 

leukocytosis of 15.4 and a mild hyponatremia with sodium of 133 but patient's 

creatinine and lactic acid were within normal limits.  Given the patient's 

recent hospitalization with septic shock secondary to UTI patient's 

presentation was very concerning for another urinary tract infection especially 

in the setting of having a chronic indwelling Gupta catheter.  The patient 

underwent a urine analysis which revealed positive nitrates, large leukocyte 

esterase, positive RBCs, 11-25 WBCs and many bacteria indicative of a urinary 

tract infection.  The patient was admitted to the medical [facility]ana for early sepsis 

with urinary tract infection.  Patient was given a dose of ceftriaxone in the 

emergency department.  The patient was given 2 L of IV fluid in the emergency 

department








- HOSPITAL COURSE


Hospital Course: 


pt was admitted for UTI caused sepsis. Pt was treated with antibiotics. pt then 

developed wheezing. The CXR reveals pt had pneumonia. pt continue to bed 

treated with antibiotics. The blood culture is negative, UA culture is 

positive. Pt is prescribed Cipro for continuing of antibiotics course. Pt's 

Gupta caused pt had multiple hospitalization of UTI and sepsis. The twice of 

bladder scan reveals pt's residence of urine is around 100cc. Pt's Gupta was d/c

, and pt is bed-bound.  nurse care and support for his urinary incontinence is 

required for pt.. 








- ALLERGIES


Allergies/Adverse Reactions: 


 Allergies











Allergy/AdvReac Type Severity Reaction Status Date / Time


 


No Known Drug Allergies Allergy   Verified 01/16/18 00:07














- MEDICATIONS


Home Medications: 


 Ambulatory Orders











 Medication  Instructions  Recorded  Confirmed


 


Acetaminophen 650 mg PO Q6H PRN 11/26/16 03/07/18


 


Fluticasone/Salmeterol [Advair 1 inh INH BID 11/26/16 03/07/18





100-50 Diskus]   


 


Clopidogrel [Plavix] 75 mg PO DAILY 30 Days  tablet 01/18/17 03/07/18


 


Ipratropium/Albuterol [Duoneb] 3 ml INH Q6H PRN 07/22/17 03/07/18


 


Mirtazapine [Remeron] 15 mg PO QPM 07/22/17 03/07/18


 


Tamsulosin HCl [Flomax] 0.4 mg PO QPM@1800 07/22/17 03/07/18


 


Latanoprost 0.005% Ophth Drops 1 drops EACHEYE QPM #1 bottle 07/25/17 03/07/18





[Xalatan Ophth Drops]   


 


Calcium Carbonate [Tums (Calcium 500 mg PO Q8HR PRN #30 tablet 07/27/17 03/07/18





Carbonate 500mg)]   


 


Guaifenesin [Child Mucinex Chest 10 ml PO Q4H PRN 11/10/17 03/07/18





Congestion]   


 


Cholecalciferol [Vitamin D3] 5,000 unit PO FR@0800 01/25/18 03/07/18


 


Senna [Senokot] 1 tab PO DAILY@1200 01/26/18 03/07/18


 


Gabapentin 200 mg PO QPM 02/26/18 03/07/18


 


Gabapentin [Neurontin] 200 mg PO 0700,1400 03/07/18 03/07/18


 


Ciprofloxacin HCl [Cipro] 500 mg PO BID #10 tablet 03/12/18 














- PHYSICAL EXAM AT DISCHARGE


General Appearance: positive: No acute distress, Alert.  negative: Lethargic


Eyes Bilateral: positive: Normal inspection, PERRL, No lid inflammation, 

Conjunctivae nml


ENT: positive: ENT inspection nml, Pharynx nml, No signs of dehydration.  

negative: Purulent nasal drainage, Pharyngeal erythema, Oral lesions


Neck: positive: Nml inspection, Thyroid nml, No JVD, Trachea midline.  negative

: Thyromegaly, Lymphadenopathy (R), Lymphadenopathy (L), Stiff neck, Carotid 

bruit, Swelling/bruising, Tracheal deviation


Respiratory: positive: Chest non-tender, No respiratory distress, Breath sounds 

nml.  negative: Wheezes, Rales, Rhonchi


Cardiovascular: positive: Regular rate & rhythm, No gallop, Systolic murmur, 

Diastolic murmur.  negative: Irregularly irregular, Extrasystoles, Tachycardia, 

Bradycardia


Peripheral Pulses: positive: 2+


Abdomen: positive: Non-tender, No organomegaly, Nml bowel sounds, No 

distention.  negative: Tenderness, Guarding


Back: positive: Nml inspection.  negative: CVA tenderness (R), CVA tenderness (L

)


Skin: positive: Color nml, No rash, Warm, Dry.  negative: Cyanosis, Diaphoresis

, Pallor


Extremities: positive: Nml appearance.  negative: Calf tenderness, Joint 

swelling, Glenn's sign/cords


Neurologic/Psychiatric: positive: Sensation nml, Mood/affect nml.  negative: 

Sensory loss, Facial droop, Slurred/abnml speech, Depressed mood/affect





- LABS


Result Diagrams: 


 03/12/18 10:21





 03/12/18 10:21





- FOLLOW UP


Follow Up: 


May see Dr. Alba at arrival to [facility], have CXR in one week. Pt's urine 

residence was 104 and 105 in twice bladder scan. pt has urinary incontinence. pt

's Gupta is out now








- TIME SPENT


Time Spent in Discharge (Minutes): 50

## 2018-03-12 NOTE — DISCHARGE PLAN
Discharge Plan for SNF / [facility]





- DC Plan and Transition Orders


Disposition: 03 SNF DC/Xfer


Condition: Stable


SNF Transition Orders: 





Admit to: [[facility]] under the care of [Doctor Alba]





Discharge Diagnosis:  


[UTI/sepsis, COPD, pneumonia, BPH, urinary incontinence]





Medicare Certification: I do not certify that Post Hospital skilled nursing 

care is medically necessary on a continuing basis for any of the conditions for 

which she/he is receiving care during hospitalization. Pt is a residence of 

[facility].





 Notify PCP of admission and forward orders to primary provider for signature.





Weight on admission and [74.5 kg].  Call PCP immediately if weight increases by 

[4] pounds or if patient develops dyspnea, chest pain/tightness or edema.





House Bowel Program: [Yes]


If no BM after 2 days, nurse may give M.O.M. 30ml PO PRN and /or ducolax Supp 1 

NJ and /or SHARAN 250mg P.O., and/or senna 1-2 tabs PO.  On day 3 nurse may give 

repeat above order until residents constipation is resolved. 





Immunizations:





Annual Influenza Vaccine: [Yes].


(between Sept 1st and March 31st.) Unless allergy or already given





Two-Step PPD: [Yes]


per [facility] [id number] or appropriate documentation of approved exceptions


   


Treatments & Other Orders: [May see Dr. Alba at arrival to [facility]. Pt's 

urine residence in bladder was 104 and 105 in twice bladder scan. pt has 

urinary incontinence. pt's Gupta is out now.]   





Oxygen Orders: [y]      





Lab Tests or X-Rays Orders: [in one week]





Orthopedic Orders: [no].








Medications:





PLEASE REFER TO THE DISCHARGE MEDICATION LIST.








Insulin Orders? [No]





Diagnosis: Diabetes


Initiate hypo and hyperglycemia protocols for BG <70 and BG >375.  May check BG 

prn for signs/symptoms of dysglycemia.





Frequency of BG checks: [AC/Meal/HS]





Basal Insulin:


   


   [] Lantus  100 units / ml inject subq as follows: []


   [] Other: []





Correction Insulin: -  Select the type of insulin below





   [Choose: Novolog/Humalog]100 units /ml insulin inject subq per orders 

indicate below














[] LOW DOSE


  [] MODERATE DOSE


  [] MODERATE/HIGH     


       DOSE [] HIGH DOSE


 


 


    GB               UNITS       GB               UNITS         GB             

  UNITS    GB               UNITS


 


 


         0 UNITS          0 UNITS          0 UNITS     

     0 UNITS


 


 


141-175       1 UNITS 141-175       1 UNITS 141-175       2 UNITS 141-175       

3 UNITS


 


 


176-225       2 UNITS 176-225       3 UNITS 176-225       4 UNITS 176-225       

5 UNITS


 


 


226-275       3 UNITS 226-275       5 UNITS 226-275       6 UNITS 226-275       

7 UNITS


 


 


276-325       4 UNITS 276-325       7 UNITS 276-325       8 UNITS 276-325       

9 UNITS


 


 


326-375       5 UNITS 326-375       9 UNITS 326-375      10 UNITS 326-375      

11 UNITS


 


 


>375    CONTACT MD >375    CONTACT MD >375    CONTACT MD >375    CONTACT MD


 











Custom Dosing: 





   [Choose: None/Novolog/Humalog] 100 units/ml Insulin inject subq as follows:











   GB    Units


 


 [] Units


 


141-175 [] Units


 


176-225 [] Units


 


226-275 [] Units


 


276-325 []Units


 


326-375 [] Units


 


>375 Contact MD














Allergies and Adverse Reactions: 


 Allergies











Allergy/AdvReac Type Severity Reaction Status Date / Time


 


No Known Drug Allergies Allergy   Verified 01/16/18 00:07














- Medications


New Prescriptions: 


Ciprofloxacin HCl [Cipro] 500 mg PO BID #10 tablet





- Diet


Type: Geriatric


Texture: Mech soft


Liquids: Thin


May have monthly special meal: Yes





- Therapies | Activity


Rehabilitation Potential: Maximize functional status


Activity: Activity as Tolerated


Weight Bearing: Full Weight


Additional Instructions: 


May see Dr. Alba at arrival to [facility], have CXR in one week

## 2018-03-15 ENCOUNTER — HOSPITAL ENCOUNTER (OUTPATIENT)
Dept: HOSPITAL 76 - PC | Age: 83
Discharge: HOME | End: 2018-03-15
Attending: NURSE PRACTITIONER
Payer: MEDICARE

## 2018-03-15 DIAGNOSIS — Z66: ICD-10-CM

## 2018-03-15 DIAGNOSIS — J18.9: ICD-10-CM

## 2018-03-15 DIAGNOSIS — Z51.5: Primary | ICD-10-CM

## 2018-03-15 DIAGNOSIS — M62.81: ICD-10-CM

## 2018-03-15 DIAGNOSIS — N39.0: ICD-10-CM

## 2018-03-15 DIAGNOSIS — J44.9: ICD-10-CM

## 2018-03-15 DIAGNOSIS — E11.9: ICD-10-CM

## 2018-03-15 PROCEDURE — 99309 SBSQ NF CARE MODERATE MDM 30: CPT

## 2018-03-15 NOTE — CONSULTATION NOTE
Palliative Care Follow Up





- Referral


Referring Provider: Dr Rodriguez


Time of Visit: 3/15/2018.  13:45 - 14:15


Referral setting: Skilled Nursing Facility ([facility])


Referral Reason: Pneumonia, UTI





- Information Sources


Records reviewed: RN notes reviewed, Previous records reviewed


History/Review of Systems obtained from: Patient, Nursing


Exam limitations: Clinical condition (dementia; short-term memory deficits)





- History of Present Illness Update


Brief HPI Update: 





This is a nati 84-year-old gentleman with severe end-stage COPD and history 

of recurrent pneumonias, COPD exacerbations, and recurrent urinary infections 

both prior to and post-Gupta catheter placement for BPH and retention.





The patient was hospitalized March 7-12 for pneumonia, sepsis, and UTI.  His 

Gupta catheter was removed during hospitalization and has not been re-placed 

since being back at [facility]. He is currently on a 5 day course of levofloxacin 

for the pneumonia.





Nursing reports he was less active and stays in bed since his discharge from 

hospital a few days ago, with bloating and puffiness of the face.  The bloating 

and puffiness have improved and today he is sitting up in his wheelchair. They 

report his appetite has not been very good for the last 3 days eating only 0-25

% of his meals.  However, he does have a stash of Oreo cookies which he might 

be snacking on between meals.  He has gained weight: 177 pounds today vs 166.6 

pounds on March 1, likely a good portion of this is fluid retention. 





He admits to not feeling very good, making the hand gesture for "so-so".  








Social History





- Living Situation


Living arrangement: Nursing home ([facility])


Living Situation: With caregiver(s)


Support System: 





Friends/DPOAs, Jemima and Hollis, visit him frequently.  The patient has no 

other family or close friends available for support.  His daughter passed away 

years ago, and he does not see his 2 grandchildren.








Medications/Allergies





- Medications


Home Medications: 


 Ambulatory Orders











 Medication  Instructions  Recorded  Confirmed


 


Acetaminophen 650 mg PO Q6H PRN 11/26/16 03/15/18


 


Fluticasone/Salmeterol [Advair 1 inh INH BID 11/26/16 03/15/18





100-50 Diskus]   


 


Clopidogrel [Plavix] 75 mg PO DAILY 30 Days  tablet 01/18/17 03/15/18


 


Ipratropium/Albuterol [Duoneb] 3 ml INH Q6H PRN 07/22/17 03/15/18


 


Mirtazapine [Remeron] 15 mg PO QPM 07/22/17 03/15/18


 


Tamsulosin HCl [Flomax] 0.4 mg PO QPM@1800 07/22/17 03/15/18


 


Latanoprost 0.005% Ophth Drops 1 drops EACHEYE QPM #1 bottle 07/25/17 03/15/18





[Xalatan Ophth Drops]   


 


Calcium Carbonate [Tums (Calcium 500 mg PO Q8HR PRN #30 tablet 07/27/17 03/15/18





Carbonate 500mg)]   


 


Guaifenesin [Child Mucinex Chest 10 ml PO Q4H PRN 11/10/17 03/15/18





Congestion]   


 


Cholecalciferol [Vitamin D3] 5,000 unit PO FR@0800 01/25/18 03/15/18


 


Senna [Senokot] 1 tab PO DAILY@1200 01/26/18 03/15/18


 


Gabapentin 200 mg PO QPM 02/26/18 03/15/18


 


Gabapentin [Neurontin] 200 mg PO 0700,1400 03/07/18 03/15/18


 


HYDROcod/ACETAM 5/325 [Norco 5/325] 1 tab PO Q4H PRN 03/15/18 03/15/18


 


Levofloxacin [Levaquin] 750 mg PO DAILY MDD for 5 days 03/15/18 03/15/18





 from 3/13/18  














- Allergies


Allergies/Adverse Reactions: 


 Allergies











Allergy/AdvReac Type Severity Reaction Status Date / Time


 


No Known Drug Allergies Allergy   Verified 01/16/18 00:07














Review of Systems





- Constitutional


Constitutional: reports: Fatigue, Weakness, Poor appetite, Weight gain





- Ears, Nose & Throat


Ears, Nose & Throat: reports: Nasal pain (177 lbs 3/15/2018.  166.6 lbs3/1/ 2018.  Weigh has been 155-166 lbs since last summer. Patient is still bloated 

post-discharge, but showing improvement over past 2 days.)





- Respiratory


Respiratory: reports: Wheezing, SOB with exertion, Other (Not on oxygen during 

this visit.).  denies: Cough, SOB at rest





- Gastrointestinal


Gastrointestinal: reports: Abdominal distention, Bloating





- Genitourinary


Genitourinary: reports: Incontinence.  denies: Dysuria





- Musculoskeletal


Musculoskeletal: reports: Assistive devices (Wheelchair), Transfer issues (Non 

weight bearing), Other (Shoulder pain improved since starting Norco. He's 

taking 1 or less per day.)





- Neurological


Neurological: reports: General weakness





- Psychiatric


Psychiatric: denies: Depression, Anxiety





- Endocrine


Endocrine: reports: Diabetes type 2 (HbgA1c 6.3)





- Hematologic/Lymphatic


Hematologic/Lymphatic: reports: Recurrent infections (pneumonia and UTIs)





Physical Exam





- Vital Signs


Temperature: 97.1 F


Pulse Rate: 74


O2 Saturation: 96 (on room air)


Blood Pressure: 118/56





- Physical Exam


General Appearance: positive: No acute distress, Alert


Eyes Bilateral: positive: EOMI, Conjunctivae nml, No scleral icterus.  negative

: No lid inflammation (mild)


ENT: positive: No signs of dehydration


Neck: positive: No JVD, Trachea midline, Stiff neck


Cardiovascular: positive: No gallop, Systolic murmur (2/6)


Respiratory: positive: Chest non-tender, No respiratory distress, Diminished 

throughout, Wheezes


Abdomen: positive: Abnml bowel sounds (hypoactive), Distended.  negative: 

Tenderness


Skin: positive: Dryness (chronic scaly flakes)


Extremities: positive: Nml appearance, No pedal edema


Neurologic/Psychiatric: positive: Disoriented to time, Flat affect





Palliative Care





- POLST


Patient has POLST: Yes


POLST Status: DNR, Selective Treatment





- Palliative Care


Discussion: 





He says that he feels like he is "walking the plank" -- ie, close to walking 

off the end of the plank (dying).  He says he is not scared of death. He feels 

like he's one of the last amongst his family and friends, and that can feel 

lonely.  He tries not to worry about the little things.  He feels like he has 

not left anything in life undone, "All is taken care of."  His only daughter is 

gone, so there's nothing left there. He doesn't see his two grandchildren, he 

wasn't able to recall their gender -- they don't come to see him.





Despite that, he thinks his quality of life is good and he's pretty happy, so 

he is still willing to be taken to the hospital for  treatment of reversible 

conditions, that life is worth it.





He was a little worried about his finances, and then started asking about his 

car, becoming tearful. He was upset that "they" took his car. I said he wasn't 

able to drive it anyway, and he responded that he could drive. I explained that 

any income he has would go toward paying for him to live at [facility] and he 

accepted that and calmed down.  He also calmed down about the car, said it was 

in the garage (Onie and Hollis's?)





When he was feeling tearful, I told him he was cared about and very well-liked 

here, that he was part of the village, and he liked that. He said, "You are a 

breath of fresh air." Very sweet.








Impression and Recommendations





- Palliative Care


Impression: 





This is a nati 84-year-old gentleman with several significant comorbidities 

and history of recurrent pneumonias and UTIs.  He just was discharged earlier 

this week from hospital for both pneumonia and UTI and he remains at high risk 

for continuing sequela.  He is still weak and somewhat disoriented, but appears 

to be slowly improving, with the bloating and puffiness slowly resolving. His 

antibiotic cycle will finish around 3/18. He continues to feel that going to 

the hospital for treatment, as unpleasant as it is, is still worth it.  

Palliative care will continue to oversee and monitor him, and will refer him to 

hospice when it is appropriate and meets the patient's goals and wishes.





Recommendations/Counseling Done: 





Pneumonia: Discharged from hospital a few days ago, recovering. Continue 

levofloxacin 750mg daily until approximately March 18, and probiotics.





End-stage COPD: Currently stable, continue duo nebs as needed, oxygen, Advair 

twice daily.





UTI: Recently discharged from hospital, on levofloxacin and probiotics.  Gupta 

catheter was removed in the hospital, he is currently voiding without problem.





Shoulder pain: Complained of increased pain during hospitalization. Initiation 

of PRN Norco seems to have resolved it, he doesn't complain of pain.  He has 

been using it about once a day, or less.  Voltaren gel has been dropped from 

his MAR, need to follow up.





Advanced care planning: POLST is DNR and selective treatment.  He still 

considers his quality of life good and would like to continue to be treated for 

reversible conditions, including hospitalization as indicated.  Palliative care 

will continue to oversee his care and transition him to hospice when he meets 

criteria and hospice meets his goals.





Follow-up with [facility] next week.





Time Spent: 





30 minutes were spent with more than 50% of the time spent on counseling, 

education, and coordination of care.

## 2018-03-21 ENCOUNTER — HOSPITAL ENCOUNTER (OUTPATIENT)
Dept: HOSPITAL 76 - PC | Age: 83
Discharge: HOME | End: 2018-03-21
Attending: NURSE PRACTITIONER
Payer: MEDICARE

## 2018-03-21 DIAGNOSIS — R33.8: ICD-10-CM

## 2018-03-21 DIAGNOSIS — Z51.5: Primary | ICD-10-CM

## 2018-03-21 DIAGNOSIS — M25.519: ICD-10-CM

## 2018-03-21 DIAGNOSIS — J18.9: ICD-10-CM

## 2018-03-21 DIAGNOSIS — Z66: ICD-10-CM

## 2018-03-21 DIAGNOSIS — J44.9: ICD-10-CM

## 2018-03-21 DIAGNOSIS — F32.9: ICD-10-CM

## 2018-03-21 DIAGNOSIS — M62.81: ICD-10-CM

## 2018-03-21 DIAGNOSIS — F03.90: ICD-10-CM

## 2018-03-21 DIAGNOSIS — N39.0: ICD-10-CM

## 2018-03-21 DIAGNOSIS — R11.0: ICD-10-CM

## 2018-03-21 DIAGNOSIS — N40.1: ICD-10-CM

## 2018-03-21 DIAGNOSIS — Z79.891: ICD-10-CM

## 2018-03-21 PROCEDURE — 99310 SBSQ NF CARE HIGH MDM 45: CPT

## 2018-03-21 NOTE — CONSULTATION NOTE
Palliative Care Follow Up





- Referral


Referring Provider: Dr Rodriguez


Time of Visit: 3/21/2018.  16:55 - 17:30


Referral setting: Skilled Nursing Facility ([facility])


Referral Reason: Functional decline, bladder retention





- Information Sources


Records reviewed: RN notes reviewed, Previous records reviewed


History/Review of Systems obtained from: Patient, Friend, Nursing


Exam limitations: Clinical condition (dementia, short-term memory deficits)





- History of Present Illness Update


Brief HPI Update: 





This is a nati 84-year-old gentleman with severe end-stage COPD and history 

of recurrent pneumonias, COPD exacerbations, and recurrent urinary infections 

both prior to and post-Gupta catheter placement for BPH and retention.





The patient was hospitalized March 7-12 for pneumonia, sepsis, and UTI.  At 

that time his Gupta catheter was removed and has not been yet been re-placed 

since being back at care age.  However he is exhibiting bladder retention, 

nursing reported a bladder scan of 300 cc today, they were able to remove 200 

cc with straight catheterization, and then again an additional 34 cc.  He 

indicates extreme discomfort and pain with catheterization.





Unlike previous hospitalizations, when the patient has consistently "bounced 

back" after discharge, this time he has not.  He appears ill, stressed, pale, 

and uncomfortable, reports pain, confirms he feels nauseous. When asked he says 

he feels awful but is unable to elaborate.





Today he is lying in bed, is lethargic with mostly unintelligible speech. His 

O2 sats are reading at 71-77% while he is lying down and does not appear in 

respiratory distress.  He has not been eating well.  The nursing aid got him up 

into his wheelchair to weigh him, and after this task his O2 sats normalized to 

95%.  Sitting up he still appears wan with an unhealthy pallor, and complains 

of pain in shoulders.  The levofloxacin regimen has completed.





The hallucinations have not occurred for two days, which coincides with the 

completion of the levofloxacin and also having the Norco on hold. I took the 

Norco off hold today to use for his pain, and instructed nursing to monitor for 

hallucinations and other adverse effects.








Social History





- Living Situation


Living arrangement: Nursing home ([facility])


Living Situation: With caregiver(s)


Support System: 





Friends/DPOAs Casper and her  Hollis visit him regularly. He is also very 

popular and well liked in the nursing home.








Medications/Allergies





- Medications


Home Medications: 


 Ambulatory Orders











 Medication  Instructions  Recorded  Confirmed


 


Acetaminophen 650 mg PO Q6H PRN 11/26/16 03/22/18


 


Fluticasone/Salmeterol [Advair 1 inh INH BID 11/26/16 03/22/18





100-50 Diskus]   


 


Clopidogrel [Plavix] 75 mg PO DAILY 30 Days  tablet 01/18/17 03/22/18


 


Ipratropium/Albuterol [Duoneb] 3 ml INH Q6H PRN 07/22/17 03/22/18


 


Mirtazapine [Remeron] 15 mg PO QPM 07/22/17 03/22/18


 


Tamsulosin HCl [Flomax] 0.4 mg PO QPM@1800 07/22/17 03/22/18


 


Latanoprost 0.005% Ophth Drops 1 drops EACHEYE QPM #1 bottle 07/25/17 03/22/18





[Xalatan Ophth Drops]   


 


Calcium Carbonate [Tums (Calcium 500 mg PO Q8HR PRN #30 tablet 07/27/17 03/22/18





Carbonate 500mg)]   


 


Guaifenesin [Child Mucinex Chest 10 ml PO Q4H PRN 11/10/17 03/22/18





Congestion]   


 


Cholecalciferol [Vitamin D3] 5,000 unit PO FR@0800 01/25/18 03/22/18


 


Senna [Senokot] 1 tab PO DAILY@1200 01/26/18 03/22/18


 


Gabapentin 200 mg PO QPM 02/26/18 03/22/18


 


Gabapentin [Neurontin] 200 mg PO 0700,1400 03/07/18 03/22/18


 


HYDROcod/ACETAM 5/325 [Norco 5/325] 1 tab PO Q4H PRN 03/15/18 03/22/18














- Allergies


Allergies/Adverse Reactions: 


 Allergies











Allergy/AdvReac Type Severity Reaction Status Date / Time


 


No Known Drug Allergies Allergy   Verified 01/16/18 00:07














Review of Systems





- Constitutional


Constitutional: reports: Fatigue, Weakness, Poor appetite, Weight stable (Back 

to his baseline:  166.8 lbs on 3/21/18.  He's been around 160-166 lbs since 

last summer.)





- Ears, Nose & Throat


Ears, Nose & Throat: reports: Hearing loss, Dentures





- Cardiovascular


Cardiovascular: reports: Exertional dyspnea, Decr. exercise tolerance





- Respiratory


Respiratory: reports: SOB with exertion





- Gastrointestinal


Gastrointestinal: reports: Nausea, Poor appetite.  denies: Abdominal distention





- Genitourinary


Genitourinary: reports: Incontinence, Other (retention: 300cc post void; voided 

200cc with straight cath)





- Musculoskeletal


Musculoskeletal: reports: Assistive devices (wheelchair), Transfer issues, 

Other (shoulder pain)





- Integumentary


Integumentary: reports: Dryness





- Neurological


Neurological: reports: General weakness, Memory problems, Slurred speech





- Psychiatric


Psychiatric: reports: Depression





Physical Exam





- Vital Signs


Temperature: 97.1 F


Pulse Rate: 93


O2 Saturation: 71 (increased to 95% when sitting up)


Blood Pressure: 155/68





- Physical Exam


General Appearance: positive: Moderate distress, Lethargic


ENT: positive: Dry mucous membranes


Neck: positive: No JVD, Trachea midline


Cardiovascular: positive: Regular rate & rhythm, No gallop, Systolic murmur (3/6

)


Respiratory: positive: No respiratory distress


Skin: positive: Dryness (chronically scaley)


Neurologic/Psychiatric: positive: Disoriented to time, Weakness, Slurred/abnml 

speech, Unintelligible speech, Depressed mood/affect





Palliative Care





- POLST


Patient has POLST: Yes


POLST Status: DNR, Selective Treatment


Pain: Location (shoulder)


Tiredness/Fatigue: Severe (7-10)


Drowsiness/Sedation: Severe (7-10)


Nausea: Moderate (4-6)


Anorexia: Moderate (4-6)





- Palliative Care


Discussion: 





Patient remains consistent when asked whether he would still want to go to the 

hospital, and he says he would want to, saying "What else would I do?"  When 

asked if he thinks it's worth it, he responds yes. His dementia is advanced and 

unfortunately patient is lacking decisional capacity.





I did speak with Casper, his friend and DPPREETI. She is sad to see that he is not 

bouncing back like he has after other hospitalizations. For the first time ever

, he didn't seem to recognize them. He also did not react with his same level 

of jeremiah and cheer to having cookie treats given to him.





She expresses hope that he will come around and recuperate as he has in the 

past. She was encouraged that he initially showed some level of improvement 

after leaving the hospital. We discussed his disease process, with my goal 

being to prepare her for the inevitable decline, whether it's this occasion, or 

sometime in the near future. His previous cultures indicate multi-resistant 

infections, his recurrent infections are reoccurring more and more frequently, 

and antibiotics are less and less effective. 





The patient certainly qualifies for Hospice, and I will continue the discussion 

with the DPOA, weighing benefits and burdens and making that difficult call of 

determining when the quality of his life indicates a transition to Hospice is 

appropriate. In the meantime the goal is to keep him as comfortable as possible.


 





Impression and Recommendations





- Palliative Care


Impression: 





This is a nati 84-year-old gentleman with severe and significant 

comorbidities and history of increasingly frequent pneumonias, UTIs, and 

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 He qualifies for 

Hospice but continues to express the wish to continue treatment if needed. 

Palliative care will continue to monitor the patient's condition and comfort 

levels and work with his DPOA, weighing benefits and burdens of ongoing 

treatment and hospitalizations vs transition to hospice and comfort care.





Recommendations/Counseling Done: 





Pneumonia:  Levofloxacin has completed.  Hallucinations have abated. Still 

doing poorly and complaining of general discomfort and pain.





End stage COPD:  O2 sats significantly decreased (in 70s) while reclining in bed

, rebounded to normal levels when patient was sitting in wheelchair. Continue 2-

3L per nasal canula.





Shoulder pain:  Using Voltaren and Tylenol.  Restarted Norco for pain control. 

Nursing to monitor for adverse reaction such as increased hallucinations.





BPH: Bladder scan reveals retention, 300 cc today. Monitor overnight, re-start 

on Gupta catheter when indicated.





Advanced care planning:  POLST DNR and limited. Patient expresses wish to 

continue with hospitalizations as needed. Continue to work with DPOA and 

provide educations, with goal to transition to Hospice when burdens outweigh 

benefits and his quality of life is significantly impacted by continuing 

treatment. Currently DPOA expresses hope that he will bounce back as he has 

after previous hospitalizations.





Time Spent: 





35 minutes were spent with more than 50% of the time spent on counseling, 

education, and coordination of care.

## 2018-03-28 ENCOUNTER — HOSPITAL ENCOUNTER (OUTPATIENT)
Dept: HOSPITAL 76 - PC | Age: 83
Discharge: HOME | End: 2018-03-28
Attending: NURSE PRACTITIONER
Payer: MEDICARE

## 2018-03-28 DIAGNOSIS — K59.03: ICD-10-CM

## 2018-03-28 DIAGNOSIS — Z66: ICD-10-CM

## 2018-03-28 DIAGNOSIS — M62.81: ICD-10-CM

## 2018-03-28 DIAGNOSIS — Z96.0: ICD-10-CM

## 2018-03-28 DIAGNOSIS — Z99.81: ICD-10-CM

## 2018-03-28 DIAGNOSIS — M25.519: ICD-10-CM

## 2018-03-28 DIAGNOSIS — G89.29: ICD-10-CM

## 2018-03-28 DIAGNOSIS — T40.2X5D: ICD-10-CM

## 2018-03-28 DIAGNOSIS — Z79.891: ICD-10-CM

## 2018-03-28 DIAGNOSIS — F03.90: ICD-10-CM

## 2018-03-28 DIAGNOSIS — R33.9: ICD-10-CM

## 2018-03-28 DIAGNOSIS — Z51.5: Primary | ICD-10-CM

## 2018-03-28 DIAGNOSIS — R06.02: ICD-10-CM

## 2018-03-28 DIAGNOSIS — J44.9: ICD-10-CM

## 2018-03-28 DIAGNOSIS — R53.83: ICD-10-CM

## 2018-03-28 PROCEDURE — 99310 SBSQ NF CARE HIGH MDM 45: CPT

## 2018-03-28 NOTE — CONSULTATION NOTE
Palliative Care Follow Up





- Referral


Referring Provider: Dr Joes Rodriguez


Time of Visit: 3/28/2018.  13:50 - 14:35


Referral setting: Skilled Nursing Facility ([facility])


Referral Reason: End-stage COPD





- Information Sources


Records reviewed: RN notes reviewed, Previous records reviewed


History/Review of Systems obtained from: Patient, Friend, Nursing, Other (Dr Alba, medical director of [facility])


Exam limitations: Clinical condition (Dementia, short-term memory issues; 

lethargy)





- History of Present Illness Update


Brief HPI Update: 





This is a nati 84-year-old gentleman with severe end-stage COPD and history 

of recurrent pneumonias, COPD exacerbations, and urinary tract infections both 

prior to and post-Gupta catheter placement for BPH and retention.





The patient was hospitalized earlier this month for pneumonia, sepsis, and UTI.

  His Gupta was removed and hospital, but subsequently re-placed due to urinary 

retention.





The patient has demonstrated significant and marked good functional and 

cognitive decline since his most recent hospitalization, sleeping more, eating 

less, with markedly lethargy and unintelligible speech.  He has a less 

toleration for being up and out of bed and he is now a 2 person transfer 

previously required 1 person for transfer.





Patient has been describing himself as not feeling well or so-so but is unable 

to elaborate. Shoulder pain has improved with [place] as needed. He denied 

shortness of breath at rest. He is on 3L oxygen 24/7.





History of recent hospitalizations: March 2018 UTI, pneumonia, sepsis. Sept 2017 severe sepsis.  July 2017 UTI. 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 Sept 2016 admitted twice: 

respiratory failure, and pneumonia and COPD exacerbation. August 2016 COPD 

exacerbation. January 2016 COPD exacerbation.








Social History





- Living Situation


Living arrangement: Nursing home ([facility])


Living Situation: With caregiver(s)


Support System: 





Friends and DPOA Casper and her  Hollis visit him regularly. He is very 

popular and well liked in the facility.








Medications/Allergies





- Medications


Home Medications: 


 Ambulatory Orders











 Medication  Instructions  Recorded  Confirmed


 


Acetaminophen 650 mg PO Q6H PRN 11/26/16 03/22/18


 


Fluticasone/Salmeterol [Advair 1 inh INH BID 11/26/16 03/22/18





100-50 Diskus]   


 


Clopidogrel [Plavix] 75 mg PO DAILY 30 Days  tablet 01/18/17 03/22/18


 


Ipratropium/Albuterol [Duoneb] 3 ml INH Q6H PRN 07/22/17 03/22/18


 


Mirtazapine [Remeron] 15 mg PO QPM 07/22/17 03/22/18


 


Tamsulosin HCl [Flomax] 0.4 mg PO QPM@1800 07/22/17 03/22/18


 


Latanoprost 0.005% Ophth Drops 1 drops EACHEYE QPM #1 bottle 07/25/17 03/22/18





[Xalatan Ophth Drops]   


 


Calcium Carbonate [Tums (Calcium 500 mg PO Q8HR PRN #30 tablet 07/27/17 03/22/18





Carbonate 500mg)]   


 


Guaifenesin [Child Mucinex Chest 10 ml PO Q4H PRN 11/10/17 03/22/18





Congestion]   


 


Cholecalciferol [Vitamin D3] 5,000 unit PO FR@0800 01/25/18 03/22/18


 


Senna [Senokot] 1 tab PO DAILY@1200 01/26/18 03/22/18


 


Gabapentin 200 mg PO QPM 02/26/18 03/22/18


 


Gabapentin [Neurontin] 200 mg PO 0700,1400 03/07/18 03/22/18


 


HYDROcod/ACETAM 5/325 [Norco 5/325] 1 tab PO Q4H PRN 03/15/18 03/22/18














- Allergies


Allergies/Adverse Reactions: 


 Allergies











Allergy/AdvReac Type Severity Reaction Status Date / Time


 


No Known Drug Allergies Allergy   Verified 01/16/18 00:07














Review of Systems





- Constitutional


Constitutional: reports: Fatigue, Weakness, Poor appetite, Weight stable (166/8 

lbs 3/21/18. Baseline is 160-166 lbs since last summer)





- Ears, Nose & Throat


Ears, Nose & Throat: reports: Hearing loss, Dentures





- Cardiovascular


Cardiovascular: reports: Exertional dyspnea, Decr. exercise tolerance.  denies: 

Edema





- Respiratory


Respiratory: reports: SOB with exertion





- Gastrointestinal


Gastrointestinal: reports: Poor appetite.  denies: Constipation





- Genitourinary


Genitourinary: reports: Other (Gupta catheter; recurrent UTIs)





- Musculoskeletal


Musculoskeletal: reports: Stiffness, Limited range of motion, Joint pain (

Chronic shoulder pain), Assistive devices (Wheelchair), Transfer issues (

Declined from 1-person to 2-person transfer since most recent hospitalization)





- Integumentary


Integumentary: reports: Dryness





- Neurological


Neurological: reports: General weakness, Memory problems, Slurred speech





- Psychiatric


Psychiatric: reports: Depression.  denies: Behavior disturbances





Physical Exam





- Vital Signs


Temperature: 96.9 F


Pulse Rate: 53


O2 Saturation: 95 (3L O2)


Blood Pressure: 130/68





- Physical Exam


General Appearance: positive: No acute distress, Lethargic


Eyes Bilateral: positive: No lid inflammation, No scleral icterus


ENT: positive: No signs of dehydration


Neck: positive: Trachea midline


Respiratory: positive: Chest non-tender, Diminished in bases.  negative: Wheezes


Abdomen: positive: Soft, Obese, Other (mild tenderness RLQ)


Skin: positive: Dryness (chronically scaly, red on face and scalp)


Extremities: positive: Nml appearance, No pedal edema


Neurologic/Psychiatric: positive: Disoriented to time, Weakness, Unintelligible 

speech, Depressed mood/affect





Palliative Care





- POLST


Patient has POLST: Yes


POLST Status: DNR, Selective Treatment


Pain: Pain improved, Location (Shoulders)


Tiredness/Fatigue: Severe (7-10)


Depression: Moderate (4-6)


Dyspnea: Comment (with exertion)


Anorexia: Mild (1-3)


Constipation: Opoid induced (start Miralax)





- Palliative Care


Discussion: 





Goals of care have been that as long as his quality of life supported it, we 

would continue to send the patient to the hospital for reversible conditions, 

but when it becomes evident he is not responding to treatment and /or 

experiences complications that impact his quality of life further, that would 

be the time to transition to comfort measures and Hospice.





The patient has a history of frequent hospitalizations for pneumonia, UTIs (

related to indwelling Gupta catheter), sepsis, and multi-organism resistance to 

antibiotics. He has not been rebounded since this last hospitalization, and 

unfortunately there is nothing more that hospitalization can offer him. 





His condition and situation was discussed at length and benefits and burdens of 

continuing to send him to hospital vs admission to Hospice reviewed with Casper, 

his DPOA, since the patient lacks decision-making capacity. Casper is in 

agreement that his quality of life has deteriorated and the patient is not 

rebounding after this last bout with sepsis. One concern about admission to 

Hospice was whether he could stay in "his home," which the facility has become. 

I assured her this was the case. 





I spoke with the patient about Hospice and that he would have some new people 

helping him. His cognition is compromised but he does have a certain level of 

understanding, if not the capacity to recall many details. He indicated 

acceptance.





I discussed this case with Dr Alba, Medical Director at [facility]. He is in 

agreement that attending physician will be Dr Gabriel, Medical Director of 

Hospice.








Impression and Recommendations





- Palliative Care


Impression: 





This is a nati 84-year-old gentleman with end-stage COPD and recurrent 

infections and sepsis. The goal has always been to maximize his quality of life 

and keep him as comfortable as possible and to continue to treat him as long as 

it would allow him to go back to his previous level of functioning. When that 

is no longer the case, as it is now, then the plan was to transition him to 

Hospice and to concentrate on maximizing comfort and quality of life. He does 

meet criteria and his DPOA is in agreement to transition to Hospice. 





Recommendations/Counseling Done: 





Dementia w/o behavioral disturbances: Patient has severe cognitive deficits is 

lacking decision making capacity, DPOA/friends are overseeing his care and 

making his medical decisions.





Shoulder pain:  Continue Norco 5/325 as needed, as well as Tylenol and 

gabapentin. Liquid morphine has also been added.





Constipation, opioid induced: Start Miralax 17g in 4-8 oz water daily for opioid

-induced constipation. Continue Senna daily.





Shortness of breath:  Morphine solution 20mg/ml. Give 0.25 ml (5mg) PO Q2h PRN 

for SOB. Continue albuterol/ipratorpium nebulizer as needed, Advair Diskus BID, 





Urinary retention:  Re-placed indwelling Gupta catheter. Change catheter and 

bag every month.





Poor appetite:  Currently on Mirtazapine. Consider Ensure





Advanced care planning:  Referral to Hospice. Patient meets medical criteria 

and Hospice aligns with patient and DPOA goals of care.





Time Spent: 





45 minutes were spent with more than 50% of the time spent on counseling, 

education, weighing benefits and burdens, anticipatory guidance, and 

coordination of care.
